# Patient Record
Sex: FEMALE | Race: WHITE | Employment: FULL TIME | URBAN - METROPOLITAN AREA
[De-identification: names, ages, dates, MRNs, and addresses within clinical notes are randomized per-mention and may not be internally consistent; named-entity substitution may affect disease eponyms.]

---

## 2017-01-05 RX ORDER — SODIUM, POTASSIUM,MAG SULFATES 17.5-3.13G
SOLUTION, RECONSTITUTED, ORAL ORAL
Qty: 1 BOTTLE | Refills: 0 | Status: SHIPPED | OUTPATIENT
Start: 2017-01-05 | End: 2017-09-06 | Stop reason: ALTCHOICE

## 2017-01-13 ENCOUNTER — TELEPHONE (OUTPATIENT)
Dept: GASTROENTEROLOGY | Age: 57
End: 2017-01-13

## 2017-02-01 ENCOUNTER — HOSPITAL ENCOUNTER (OUTPATIENT)
Dept: OTHER | Age: 57
Discharge: HOME | End: 2017-02-01
Attending: INTERNAL MEDICINE | Admitting: INTERNAL MEDICINE

## 2017-02-01 LAB
ALBUMIN SERPL-MCNC: 3.7 GM/DL (ref 3.4–5)
ALP BLD-CCNC: 104 IU/L (ref 40–129)
ALT SERPL-CCNC: 22 U/L (ref 10–40)
ANION GAP SERPL CALCULATED.3IONS-SCNC: 13 MMOL/L (ref 4–16)
AST SERPL-CCNC: 35 IU/L (ref 15–37)
BILIRUB SERPL-MCNC: 0.8 MG/DL (ref 0–1)
BUN BLDV-MCNC: 19 MG/DL (ref 6–23)
CALCIUM SERPL-MCNC: 9.3 MG/DL (ref 8.3–10.6)
CHLORIDE BLD-SCNC: 101 MMOL/L (ref 99–110)
CO2: 25 MMOL/L (ref 21–32)
CREAT SERPL-MCNC: 1 MG/DL (ref 0.6–1.1)
GFR AFRICAN AMERICAN: >60 ML/MIN/1.73M2
GFR NON-AFRICAN AMERICAN: 58 ML/MIN/1.73M2
GLUCOSE FASTING: 159 MG/DL (ref 70–99)
POTASSIUM SERPL-SCNC: 4.8 MMOL/L (ref 3.5–5.1)
SODIUM BLD-SCNC: 139 MMOL/L (ref 135–145)
TOTAL PROTEIN: 7.4 GM/DL (ref 6.4–8.2)

## 2017-02-14 ENCOUNTER — HOSPITAL ENCOUNTER (OUTPATIENT)
Dept: WOUND CARE | Age: 57
Discharge: HOME | End: 2017-02-14
Attending: FAMILY MEDICINE | Admitting: FAMILY MEDICINE

## 2017-02-14 VITALS
HEART RATE: 81 BPM | TEMPERATURE: 96.9 F | HEIGHT: 65 IN | RESPIRATION RATE: 20 BRPM | BODY MASS INDEX: 48.82 KG/M2 | SYSTOLIC BLOOD PRESSURE: 173 MMHG | DIASTOLIC BLOOD PRESSURE: 68 MMHG | WEIGHT: 293 LBS

## 2017-02-14 DIAGNOSIS — L97.112 CHRONIC ULCER OF RIGHT THIGH WITH FAT LAYER EXPOSED (HCC): ICD-10-CM

## 2017-02-14 DIAGNOSIS — L98.499 DIABETES MELLITUS WITH SKIN ULCER (HCC): ICD-10-CM

## 2017-02-14 DIAGNOSIS — L98.491 ULCER OF ABDOMEN WALL, LIMITED TO BREAKDOWN OF SKIN (HCC): ICD-10-CM

## 2017-02-14 DIAGNOSIS — E11.622 DIABETES MELLITUS WITH SKIN ULCER (HCC): ICD-10-CM

## 2017-02-14 RX ORDER — CYCLOBENZAPRINE HCL 10 MG
10 TABLET ORAL 2 TIMES DAILY
COMMUNITY
End: 2017-07-06

## 2017-02-14 RX ORDER — BUSPIRONE HYDROCHLORIDE 10 MG/1
10 TABLET ORAL 2 TIMES DAILY
COMMUNITY
End: 2017-09-06 | Stop reason: ALTCHOICE

## 2017-02-14 RX ORDER — LIDOCAINE HYDROCHLORIDE 40 MG/ML
SOLUTION TOPICAL ONCE
Status: DISCONTINUED | OUTPATIENT
Start: 2017-02-14 | End: 2017-02-15 | Stop reason: HOSPADM

## 2017-02-14 RX ORDER — NYSTATIN 10B UNIT
POWDER (EA) MISCELLANEOUS DAILY
COMMUNITY
End: 2017-02-28

## 2017-02-14 RX ORDER — NYSTATIN 100000 [USP'U]/G
POWDER TOPICAL DAILY
COMMUNITY
End: 2017-02-28

## 2017-02-14 ASSESSMENT — PAIN DESCRIPTION - PAIN TYPE: TYPE: ACUTE PAIN

## 2017-02-14 ASSESSMENT — PAIN DESCRIPTION - LOCATION: LOCATION: GROIN

## 2017-02-14 ASSESSMENT — PAIN DESCRIPTION - DESCRIPTORS: DESCRIPTORS: SORE

## 2017-02-14 ASSESSMENT — PAIN SCALES - GENERAL: PAINLEVEL_OUTOF10: 6

## 2017-02-14 ASSESSMENT — PAIN DESCRIPTION - PROGRESSION: CLINICAL_PROGRESSION: NOT CHANGED

## 2017-02-14 ASSESSMENT — PAIN DESCRIPTION - ORIENTATION: ORIENTATION: RIGHT

## 2017-02-14 ASSESSMENT — PAIN DESCRIPTION - FREQUENCY: FREQUENCY: CONTINUOUS

## 2017-02-18 ENCOUNTER — HOSPITAL ENCOUNTER (OUTPATIENT)
Dept: GENERAL RADIOLOGY | Age: 57
Discharge: HOME | End: 2017-02-18
Attending: INTERNAL MEDICINE | Admitting: INTERNAL MEDICINE

## 2017-02-18 DIAGNOSIS — R06.02 SOB (SHORTNESS OF BREATH): ICD-10-CM

## 2017-02-20 PROBLEM — J45.30 MILD PERSISTENT ASTHMA WITHOUT COMPLICATION: Status: ACTIVE | Noted: 2017-02-20

## 2017-02-28 ENCOUNTER — HOSPITAL ENCOUNTER (OUTPATIENT)
Dept: WOUND CARE | Age: 57
Discharge: HOME | End: 2017-02-28
Attending: FAMILY MEDICINE | Admitting: FAMILY MEDICINE

## 2017-02-28 ENCOUNTER — TELEPHONE (OUTPATIENT)
Dept: GASTROENTEROLOGY | Age: 57
End: 2017-02-28

## 2017-02-28 VITALS
HEART RATE: 76 BPM | TEMPERATURE: 96.4 F | DIASTOLIC BLOOD PRESSURE: 67 MMHG | SYSTOLIC BLOOD PRESSURE: 160 MMHG | RESPIRATION RATE: 16 BRPM

## 2017-02-28 DIAGNOSIS — L98.491 ULCER OF ABDOMEN WALL, LIMITED TO BREAKDOWN OF SKIN (HCC): Primary | ICD-10-CM

## 2017-02-28 RX ORDER — FLUCONAZOLE 150 MG/1
150 TABLET ORAL DAILY
Status: ON HOLD | COMMUNITY
Start: 2017-02-28 | End: 2017-03-02 | Stop reason: ALTCHOICE

## 2017-03-07 ENCOUNTER — HOSPITAL ENCOUNTER (OUTPATIENT)
Dept: WOUND CARE | Age: 57
Discharge: HOME | End: 2017-03-07
Attending: FAMILY MEDICINE | Admitting: FAMILY MEDICINE

## 2017-03-07 VITALS
RESPIRATION RATE: 20 BRPM | TEMPERATURE: 97.7 F | HEART RATE: 78 BPM | DIASTOLIC BLOOD PRESSURE: 76 MMHG | SYSTOLIC BLOOD PRESSURE: 139 MMHG

## 2017-03-07 DIAGNOSIS — L98.491 ULCER OF ABDOMEN WALL, LIMITED TO BREAKDOWN OF SKIN (HCC): Primary | ICD-10-CM

## 2017-03-07 DIAGNOSIS — L97.112 CHRONIC ULCER OF RIGHT THIGH WITH FAT LAYER EXPOSED (HCC): ICD-10-CM

## 2017-03-11 LAB
CULTURE: NORMAL
REPORT STATUS: NORMAL
REQUEST PROBLEM: NORMAL
SPECIMEN: NORMAL

## 2017-03-14 ENCOUNTER — HOSPITAL ENCOUNTER (OUTPATIENT)
Dept: WOUND CARE | Age: 57
Discharge: HOME | End: 2017-03-14
Attending: FAMILY MEDICINE | Admitting: FAMILY MEDICINE

## 2017-03-14 VITALS
TEMPERATURE: 98.5 F | RESPIRATION RATE: 20 BRPM | DIASTOLIC BLOOD PRESSURE: 82 MMHG | HEART RATE: 80 BPM | SYSTOLIC BLOOD PRESSURE: 148 MMHG

## 2017-03-14 DIAGNOSIS — S31.103A UNSPECIFIED OPEN WOUND OF ABDOMINAL WALL, RIGHT LOWER QUADRANT WITHOUT PENETRATION INTO PERITONEAL CAVITY, INITIAL ENCOUNTER: ICD-10-CM

## 2017-03-14 DIAGNOSIS — L98.491 ULCER OF ABDOMEN WALL, LIMITED TO BREAKDOWN OF SKIN (HCC): Primary | ICD-10-CM

## 2017-03-14 PROCEDURE — 99202 OFFICE O/P NEW SF 15 MIN: CPT | Performed by: INTERNAL MEDICINE

## 2017-03-14 ASSESSMENT — PAIN DESCRIPTION - FREQUENCY: FREQUENCY: INTERMITTENT

## 2017-03-14 ASSESSMENT — PAIN DESCRIPTION - LOCATION: LOCATION: GROIN

## 2017-03-14 ASSESSMENT — PAIN DESCRIPTION - PROGRESSION: CLINICAL_PROGRESSION: NOT CHANGED

## 2017-03-14 ASSESSMENT — PAIN DESCRIPTION - PAIN TYPE: TYPE: ACUTE PAIN

## 2017-03-14 ASSESSMENT — PAIN DESCRIPTION - DESCRIPTORS: DESCRIPTORS: SORE

## 2017-03-14 ASSESSMENT — PAIN SCALES - GENERAL: PAINLEVEL_OUTOF10: 6

## 2017-03-14 ASSESSMENT — PAIN DESCRIPTION - ORIENTATION: ORIENTATION: RIGHT

## 2017-03-20 ENCOUNTER — TELEPHONE (OUTPATIENT)
Dept: GASTROENTEROLOGY | Age: 57
End: 2017-03-20

## 2017-03-21 ENCOUNTER — HOSPITAL ENCOUNTER (OUTPATIENT)
Dept: WOUND CARE | Age: 57
Discharge: HOME | End: 2017-03-21
Attending: FAMILY MEDICINE | Admitting: FAMILY MEDICINE

## 2017-03-21 VITALS
SYSTOLIC BLOOD PRESSURE: 142 MMHG | RESPIRATION RATE: 16 BRPM | DIASTOLIC BLOOD PRESSURE: 65 MMHG | HEART RATE: 79 BPM | TEMPERATURE: 97.6 F

## 2017-03-21 DIAGNOSIS — L98.491 ULCER OF ABDOMEN WALL, LIMITED TO BREAKDOWN OF SKIN (HCC): ICD-10-CM

## 2017-03-21 DIAGNOSIS — L97.112 CHRONIC ULCER OF RIGHT THIGH WITH FAT LAYER EXPOSED (HCC): ICD-10-CM

## 2017-03-21 DIAGNOSIS — S31.103A UNSPECIFIED OPEN WOUND OF ABDOMINAL WALL, RIGHT LOWER QUADRANT WITHOUT PENETRATION INTO PERITONEAL CAVITY, INITIAL ENCOUNTER: Primary | ICD-10-CM

## 2017-03-29 ENCOUNTER — HOSPITAL ENCOUNTER (OUTPATIENT)
Dept: WOUND CARE | Age: 57
Discharge: HOME | End: 2017-03-29
Attending: ORTHOPAEDIC SURGERY | Admitting: ORTHOPAEDIC SURGERY

## 2017-03-29 VITALS
HEART RATE: 74 BPM | SYSTOLIC BLOOD PRESSURE: 149 MMHG | DIASTOLIC BLOOD PRESSURE: 62 MMHG | RESPIRATION RATE: 16 BRPM | TEMPERATURE: 97.5 F

## 2017-03-29 DIAGNOSIS — L97.112 CHRONIC ULCER OF RIGHT THIGH WITH FAT LAYER EXPOSED (HCC): ICD-10-CM

## 2017-03-29 DIAGNOSIS — S31.103A UNSPECIFIED OPEN WOUND OF ABDOMINAL WALL, RIGHT LOWER QUADRANT WITHOUT PENETRATION INTO PERITONEAL CAVITY, INITIAL ENCOUNTER: ICD-10-CM

## 2017-03-29 DIAGNOSIS — L98.491 ULCER OF ABDOMEN WALL, LIMITED TO BREAKDOWN OF SKIN (HCC): Primary | ICD-10-CM

## 2017-04-04 ENCOUNTER — HOSPITAL ENCOUNTER (OUTPATIENT)
Dept: WOUND CARE | Age: 57
Discharge: HOME | End: 2017-04-04
Attending: FAMILY MEDICINE | Admitting: FAMILY MEDICINE

## 2017-04-04 VITALS
SYSTOLIC BLOOD PRESSURE: 164 MMHG | HEART RATE: 88 BPM | RESPIRATION RATE: 16 BRPM | DIASTOLIC BLOOD PRESSURE: 64 MMHG | TEMPERATURE: 97.2 F

## 2017-04-04 ASSESSMENT — PAIN DESCRIPTION - PROGRESSION: CLINICAL_PROGRESSION: NOT CHANGED

## 2017-04-04 ASSESSMENT — PAIN DESCRIPTION - ONSET: ONSET: ON-GOING

## 2017-04-04 ASSESSMENT — PAIN DESCRIPTION - LOCATION: LOCATION: BACK

## 2017-04-04 ASSESSMENT — PAIN DESCRIPTION - DESCRIPTORS: DESCRIPTORS: ACHING;THROBBING;SORE

## 2017-04-04 ASSESSMENT — PAIN DESCRIPTION - FREQUENCY: FREQUENCY: CONTINUOUS

## 2017-04-04 ASSESSMENT — PAIN DESCRIPTION - PAIN TYPE: TYPE: CHRONIC PAIN

## 2017-04-04 ASSESSMENT — PAIN SCALES - GENERAL: PAINLEVEL_OUTOF10: 10

## 2017-04-04 ASSESSMENT — PAIN DESCRIPTION - ORIENTATION: ORIENTATION: LOWER;MID

## 2017-04-11 ENCOUNTER — HOSPITAL ENCOUNTER (OUTPATIENT)
Dept: WOUND CARE | Age: 57
Discharge: HOME | End: 2017-04-11
Attending: FAMILY MEDICINE | Admitting: FAMILY MEDICINE

## 2017-04-11 VITALS
RESPIRATION RATE: 20 BRPM | SYSTOLIC BLOOD PRESSURE: 165 MMHG | TEMPERATURE: 98 F | HEART RATE: 74 BPM | DIASTOLIC BLOOD PRESSURE: 64 MMHG

## 2017-04-11 DIAGNOSIS — S31.103A UNSPECIFIED OPEN WOUND OF ABDOMINAL WALL, RIGHT LOWER QUADRANT WITHOUT PENETRATION INTO PERITONEAL CAVITY, INITIAL ENCOUNTER: ICD-10-CM

## 2017-04-11 DIAGNOSIS — L98.491 ULCER OF ABDOMEN WALL, LIMITED TO BREAKDOWN OF SKIN (HCC): Primary | ICD-10-CM

## 2017-04-18 ENCOUNTER — HOSPITAL ENCOUNTER (OUTPATIENT)
Dept: WOUND CARE | Age: 57
Discharge: HOME | End: 2017-04-18
Attending: FAMILY MEDICINE | Admitting: FAMILY MEDICINE

## 2017-04-18 VITALS — HEART RATE: 82 BPM | TEMPERATURE: 97.7 F | SYSTOLIC BLOOD PRESSURE: 158 MMHG | DIASTOLIC BLOOD PRESSURE: 68 MMHG

## 2017-04-18 DIAGNOSIS — S31.103A UNSPECIFIED OPEN WOUND OF ABDOMINAL WALL, RIGHT LOWER QUADRANT WITHOUT PENETRATION INTO PERITONEAL CAVITY, INITIAL ENCOUNTER: Primary | ICD-10-CM

## 2017-04-25 ENCOUNTER — HOSPITAL ENCOUNTER (OUTPATIENT)
Dept: LAB | Age: 57
Discharge: HOME | End: 2017-04-25
Attending: INTERNAL MEDICINE | Admitting: INTERNAL MEDICINE

## 2017-04-25 LAB
ALBUMIN SERPL-MCNC: 3.6 GM/DL (ref 3.4–5)
ALP BLD-CCNC: 99 IU/L (ref 40–128)
ALT SERPL-CCNC: 20 U/L (ref 10–40)
ANION GAP SERPL CALCULATED.3IONS-SCNC: 12 MMOL/L (ref 4–16)
AST SERPL-CCNC: 37 IU/L (ref 15–37)
BILIRUB SERPL-MCNC: 0.5 MG/DL (ref 0–1)
BUN BLDV-MCNC: 22 MG/DL (ref 6–23)
CALCIUM SERPL-MCNC: 9 MG/DL (ref 8.3–10.6)
CHLORIDE BLD-SCNC: 104 MMOL/L (ref 99–110)
CO2: 24 MMOL/L (ref 21–32)
CREAT SERPL-MCNC: 1.2 MG/DL (ref 0.6–1.1)
ESTIMATED AVERAGE GLUCOSE: 171 MG/DL
GFR AFRICAN AMERICAN: 56 ML/MIN/1.73M2
GFR NON-AFRICAN AMERICAN: 47 ML/MIN/1.73M2
GLUCOSE FASTING: 107 MG/DL (ref 70–99)
HBA1C MFR BLD: 7.6 % (ref 4.2–6.3)
POTASSIUM SERPL-SCNC: 4.9 MMOL/L (ref 3.5–5.1)
SODIUM BLD-SCNC: 140 MMOL/L (ref 135–145)
T4 FREE: 1.51 NG/DL (ref 0.9–1.8)
TOTAL PROTEIN: 6.6 GM/DL (ref 6.4–8.2)
TSH HIGH SENSITIVITY: 0.45 UIU/ML (ref 0.27–4.2)

## 2017-05-02 ENCOUNTER — HOSPITAL ENCOUNTER (OUTPATIENT)
Dept: WOUND CARE | Age: 57
Discharge: HOME | End: 2017-05-02
Attending: FAMILY MEDICINE | Admitting: FAMILY MEDICINE

## 2017-05-02 VITALS
SYSTOLIC BLOOD PRESSURE: 138 MMHG | RESPIRATION RATE: 18 BRPM | DIASTOLIC BLOOD PRESSURE: 60 MMHG | HEART RATE: 72 BPM | TEMPERATURE: 98.2 F

## 2017-05-02 DIAGNOSIS — L98.491 ULCER OF ABDOMEN WALL, LIMITED TO BREAKDOWN OF SKIN (HCC): ICD-10-CM

## 2017-05-02 DIAGNOSIS — L97.112 CHRONIC ULCER OF RIGHT THIGH WITH FAT LAYER EXPOSED (HCC): ICD-10-CM

## 2017-05-02 DIAGNOSIS — S31.103A UNSPECIFIED OPEN WOUND OF ABDOMINAL WALL, RIGHT LOWER QUADRANT WITHOUT PENETRATION INTO PERITONEAL CAVITY, INITIAL ENCOUNTER: Primary | ICD-10-CM

## 2017-05-16 ENCOUNTER — HOSPITAL ENCOUNTER (OUTPATIENT)
Dept: WOUND CARE | Age: 57
Discharge: HOME | End: 2017-05-16
Attending: FAMILY MEDICINE | Admitting: FAMILY MEDICINE

## 2017-05-16 VITALS
HEART RATE: 76 BPM | TEMPERATURE: 98 F | SYSTOLIC BLOOD PRESSURE: 148 MMHG | RESPIRATION RATE: 20 BRPM | DIASTOLIC BLOOD PRESSURE: 59 MMHG

## 2017-05-16 DIAGNOSIS — L98.491 ULCER OF ABDOMEN WALL, LIMITED TO BREAKDOWN OF SKIN (HCC): ICD-10-CM

## 2017-05-16 DIAGNOSIS — S31.103A UNSPECIFIED OPEN WOUND OF ABDOMINAL WALL, RIGHT LOWER QUADRANT WITHOUT PENETRATION INTO PERITONEAL CAVITY, INITIAL ENCOUNTER: Primary | ICD-10-CM

## 2017-06-24 ENCOUNTER — HOSPITAL ENCOUNTER (OUTPATIENT)
Dept: LAB | Age: 57
Discharge: HOME | End: 2017-06-24
Attending: SPECIALIST | Admitting: SPECIALIST

## 2017-06-24 LAB
FERRITIN: 12 NG/ML (ref 15–150)
HBV SURFACE AB TITR SER: <3.5 {TITER}
HEPATITIS B SURFACE ANTIGEN: NON REACTIVE
IRON: 30 UG/DL (ref 37–145)
PCT TRANSFERRIN: 6 % (ref 10–44)
TOTAL IRON BINDING CAPACITY: 473 UG/DL (ref 250–450)
TRANSFERRIN: 404.7 MG/DL (ref 200–360)
UNSATURATED IRON BINDING CAPACITY: 443 UG/DL (ref 110–370)

## 2017-06-27 LAB
ANTI-MITOCHON TITER: 12.6
ANTI-NUCLEAR ANTIBODY (ANA): NORMAL
F-ACTIN AB, IGG: 36
HAV AB SERPL IA-ACNC: POSITIVE

## 2017-06-28 LAB
HCV QUANTITATIVE: <15 IU/ML
HEP CRNA PCR QNT INTERP: NOT DETECTED
HEPATITIS C RNA PCR QUANT: <1.2
Lab: NORMAL

## 2017-06-29 LAB — SMOOTH MUSCLE AB IGG TITER: NORMAL

## 2017-07-14 ENCOUNTER — HOSPITAL ENCOUNTER (OUTPATIENT)
Dept: LAB | Age: 57
Discharge: HOME | End: 2017-07-14
Attending: INTERNAL MEDICINE | Admitting: INTERNAL MEDICINE

## 2017-07-14 LAB
ALBUMIN SERPL-MCNC: 3.5 GM/DL (ref 3.4–5)
ALP BLD-CCNC: 96 IU/L (ref 40–128)
ALT SERPL-CCNC: 20 U/L (ref 10–40)
ANION GAP SERPL CALCULATED.3IONS-SCNC: 12 MMOL/L (ref 4–16)
AST SERPL-CCNC: 30 IU/L (ref 15–37)
BILIRUB SERPL-MCNC: 0.7 MG/DL (ref 0–1)
BUN BLDV-MCNC: 23 MG/DL (ref 6–23)
CALCIUM SERPL-MCNC: 8.8 MG/DL (ref 8.3–10.6)
CHLORIDE BLD-SCNC: 104 MMOL/L (ref 99–110)
CO2: 22 MMOL/L (ref 21–32)
CREAT SERPL-MCNC: 1.1 MG/DL (ref 0.6–1.1)
ESTIMATED AVERAGE GLUCOSE: 140 MG/DL
GFR AFRICAN AMERICAN: >60 ML/MIN/1.73M2
GFR NON-AFRICAN AMERICAN: 52 ML/MIN/1.73M2
GLUCOSE FASTING: 158 MG/DL (ref 70–99)
HBA1C MFR BLD: 6.5 % (ref 4.2–6.3)
POTASSIUM SERPL-SCNC: 4.9 MMOL/L (ref 3.5–5.1)
SODIUM BLD-SCNC: 138 MMOL/L (ref 135–145)
T4 FREE: 1.58 NG/DL (ref 0.9–1.8)
TOTAL PROTEIN: 7.5 GM/DL (ref 6.4–8.2)
TSH HIGH SENSITIVITY: 0.26 UIU/ML (ref 0.27–4.2)

## 2017-07-28 ENCOUNTER — HOSPITAL ENCOUNTER (OUTPATIENT)
Dept: CT IMAGING | Age: 57
Discharge: HOME | End: 2017-07-28
Attending: SPECIALIST | Admitting: SPECIALIST

## 2017-07-28 DIAGNOSIS — R76.0 ELEVATED ANTIBODY LEVELS: ICD-10-CM

## 2017-07-28 DIAGNOSIS — R76.0 RAISED ANTIBODY TITER: ICD-10-CM

## 2017-07-28 LAB
GFR AFRICAN AMERICAN: >60 ML/MIN/1.73M2
GFR NON-AFRICAN AMERICAN: 58 ML/MIN/1.73M2
POC CREATININE: 1 MG/DL (ref 0.6–1.1)

## 2017-07-28 RX ORDER — 0.9 % SODIUM CHLORIDE 0.9 %
10 VIAL (ML) INJECTION 2 TIMES DAILY
Status: DISCONTINUED | OUTPATIENT
Start: 2017-07-28 | End: 2017-07-29 | Stop reason: HOSPADM

## 2017-07-28 RX ADMIN — Medication 10 ML: at 09:53

## 2017-08-04 ENCOUNTER — HOSPITAL ENCOUNTER (OUTPATIENT)
Dept: OTHER | Age: 57
Discharge: HOME | End: 2017-08-04
Attending: INTERNAL MEDICINE | Admitting: INTERNAL MEDICINE

## 2017-08-04 LAB
ALBUMIN SERPL-MCNC: 3.6 GM/DL (ref 3.4–5)
ALP BLD-CCNC: 97 IU/L (ref 40–129)
ALT SERPL-CCNC: 18 U/L (ref 10–40)
ANION GAP SERPL CALCULATED.3IONS-SCNC: 11 MMOL/L (ref 4–16)
AST SERPL-CCNC: 34 IU/L (ref 15–37)
BILIRUB SERPL-MCNC: 0.7 MG/DL (ref 0–1)
BUN BLDV-MCNC: 20 MG/DL (ref 6–23)
CALCIUM SERPL-MCNC: 8.9 MG/DL (ref 8.3–10.6)
CHLORIDE BLD-SCNC: 103 MMOL/L (ref 99–110)
CO2: 23 MMOL/L (ref 21–32)
CREAT SERPL-MCNC: 1 MG/DL (ref 0.6–1.1)
FERRITIN: 12 NG/ML (ref 15–150)
GFR AFRICAN AMERICAN: >60 ML/MIN/1.73M2
GFR NON-AFRICAN AMERICAN: 58 ML/MIN/1.73M2
GLUCOSE BLD-MCNC: 145 MG/DL (ref 70–140)
IRON: 31 UG/DL (ref 37–145)
PCT TRANSFERRIN: 6 % (ref 10–44)
POTASSIUM SERPL-SCNC: 4.7 MMOL/L (ref 3.5–5.1)
SODIUM BLD-SCNC: 137 MMOL/L (ref 135–145)
TOTAL IRON BINDING CAPACITY: 481 UG/DL (ref 250–450)
TOTAL PROTEIN: 7.3 GM/DL (ref 6.4–8.2)
UNSATURATED IRON BINDING CAPACITY: 450 UG/DL (ref 110–370)

## 2017-08-30 ENCOUNTER — HOSPITAL ENCOUNTER (OUTPATIENT)
Dept: OTHER | Age: 57
Discharge: HOME | End: 2017-08-30
Attending: INTERNAL MEDICINE | Admitting: INTERNAL MEDICINE

## 2017-08-30 LAB
ALBUMIN SERPL-MCNC: 3.9 GM/DL (ref 3.4–5)
ALP BLD-CCNC: 104 IU/L (ref 40–129)
ALT SERPL-CCNC: 33 U/L (ref 10–40)
ANION GAP SERPL CALCULATED.3IONS-SCNC: 12 MMOL/L (ref 4–16)
AST SERPL-CCNC: 47 IU/L (ref 15–37)
BILIRUB SERPL-MCNC: 1 MG/DL (ref 0–1)
BUN BLDV-MCNC: 23 MG/DL (ref 6–23)
CALCIUM SERPL-MCNC: 9.4 MG/DL (ref 8.3–10.6)
CHLORIDE BLD-SCNC: 100 MMOL/L (ref 99–110)
CO2: 26 MMOL/L (ref 21–32)
CREAT SERPL-MCNC: 1.1 MG/DL (ref 0.6–1.1)
FERRITIN: 204 NG/ML (ref 15–150)
GFR AFRICAN AMERICAN: >60 ML/MIN/1.73M2
GFR NON-AFRICAN AMERICAN: 52 ML/MIN/1.73M2
GLUCOSE BLD-MCNC: 189 MG/DL (ref 70–140)
IRON: 81 UG/DL (ref 37–145)
PCT TRANSFERRIN: 22 % (ref 10–44)
POTASSIUM SERPL-SCNC: 5 MMOL/L (ref 3.5–5.1)
SODIUM BLD-SCNC: 138 MMOL/L (ref 135–145)
TOTAL IRON BINDING CAPACITY: 376 UG/DL (ref 250–450)
TOTAL PROTEIN: 7.4 GM/DL (ref 6.4–8.2)
UNSATURATED IRON BINDING CAPACITY: 295 UG/DL (ref 110–370)

## 2017-09-10 PROBLEM — R59.0 RETROPERITONEAL LYMPHADENOPATHY: Status: ACTIVE | Noted: 2017-09-10

## 2017-11-03 ENCOUNTER — HOSPITAL ENCOUNTER (OUTPATIENT)
Dept: OTHER | Age: 57
Discharge: HOME | End: 2017-11-03
Attending: INTERNAL MEDICINE | Admitting: INTERNAL MEDICINE

## 2017-11-03 LAB
FERRITIN: 26 NG/ML (ref 15–150)
IRON: 57 UG/DL (ref 37–145)
PCT TRANSFERRIN: 15 % (ref 10–44)
TOTAL IRON BINDING CAPACITY: 387 UG/DL (ref 250–450)
UNSATURATED IRON BINDING CAPACITY: 330 UG/DL (ref 110–370)

## 2017-12-08 ENCOUNTER — HOSPITAL ENCOUNTER (OUTPATIENT)
Dept: WOUND CARE | Age: 57
Discharge: HOME | End: 2017-12-08
Attending: INTERNAL MEDICINE | Admitting: INTERNAL MEDICINE

## 2017-12-08 VITALS
WEIGHT: 293 LBS | DIASTOLIC BLOOD PRESSURE: 97 MMHG | HEIGHT: 65 IN | BODY MASS INDEX: 48.82 KG/M2 | HEART RATE: 80 BPM | SYSTOLIC BLOOD PRESSURE: 142 MMHG | TEMPERATURE: 98 F | RESPIRATION RATE: 16 BRPM

## 2017-12-08 DIAGNOSIS — L08.9 LOCAL INFECTION OF SKIN AND SUBCUTANEOUS TISSUE: ICD-10-CM

## 2017-12-08 DIAGNOSIS — L98.492 ULCER OF ABDOMEN WALL WITH FAT LAYER EXPOSED (HCC): ICD-10-CM

## 2017-12-08 DIAGNOSIS — S31.103A UNSPECIFIED OPEN WOUND OF ABDOMINAL WALL, RIGHT LOWER QUADRANT WITHOUT PENETRATION INTO PERITONEAL CAVITY, INITIAL ENCOUNTER: Primary | ICD-10-CM

## 2017-12-08 RX ORDER — DOXYCYCLINE HYCLATE 100 MG
100 TABLET ORAL 2 TIMES DAILY
Qty: 20 TABLET | Refills: 0 | Status: SHIPPED | OUTPATIENT
Start: 2017-12-08 | End: 2017-12-18

## 2017-12-08 ASSESSMENT — PAIN DESCRIPTION - LOCATION: LOCATION: GROIN

## 2017-12-08 ASSESSMENT — PAIN DESCRIPTION - PAIN TYPE: TYPE: ACUTE PAIN

## 2017-12-08 ASSESSMENT — PAIN SCALES - GENERAL: PAINLEVEL_OUTOF10: 10

## 2017-12-08 ASSESSMENT — PAIN DESCRIPTION - PROGRESSION: CLINICAL_PROGRESSION: NOT CHANGED

## 2017-12-08 ASSESSMENT — PAIN DESCRIPTION - ONSET: ONSET: ON-GOING

## 2017-12-08 ASSESSMENT — PAIN DESCRIPTION - ORIENTATION: ORIENTATION: LEFT

## 2017-12-08 ASSESSMENT — PAIN DESCRIPTION - DESCRIPTORS: DESCRIPTORS: BURNING

## 2017-12-08 NOTE — PLAN OF CARE
Problem: Pain:  Intervention: Opioid analgesia side-effects  SEE FLOWSHEET  Intervention: Assess barriers to pain control  SEE FLOWSHEET  Intervention: Promote participation in pain management plan  SEE FLOWSHEET    Goal: Pain level will decrease  Pain level will decrease   Outcome: Ongoing  SEE FLOWSHEET  Goal: Control of acute pain  Control of acute pain   Outcome: Ongoing  SEE FLOWSHEET  Goal: Control of chronic pain  Control of chronic pain   Outcome: Ongoing  SEE FLOWSHEET

## 2017-12-13 LAB
CULTURE: NORMAL
REPORT STATUS: NORMAL
REQUEST PROBLEM: NORMAL
SPECIMEN: NORMAL

## 2017-12-15 ENCOUNTER — HOSPITAL ENCOUNTER (OUTPATIENT)
Dept: WOUND CARE | Age: 57
Discharge: HOME | End: 2017-12-15
Attending: INTERNAL MEDICINE | Admitting: INTERNAL MEDICINE

## 2017-12-15 VITALS — HEART RATE: 87 BPM | SYSTOLIC BLOOD PRESSURE: 189 MMHG | DIASTOLIC BLOOD PRESSURE: 69 MMHG | TEMPERATURE: 97 F

## 2017-12-15 DIAGNOSIS — L98.492 ULCER OF ABDOMEN WALL WITH FAT LAYER EXPOSED (HCC): Primary | ICD-10-CM

## 2017-12-15 DIAGNOSIS — L08.9 LOCAL INFECTION OF SKIN AND SUBCUTANEOUS TISSUE: ICD-10-CM

## 2017-12-15 DIAGNOSIS — S31.103A UNSPECIFIED OPEN WOUND OF ABDOMINAL WALL, RIGHT LOWER QUADRANT WITHOUT PENETRATION INTO PERITONEAL CAVITY, INITIAL ENCOUNTER: ICD-10-CM

## 2017-12-15 RX ORDER — CIPROFLOXACIN 500 MG/1
500 TABLET, FILM COATED ORAL 2 TIMES DAILY
Qty: 20 TABLET | Refills: 0 | Status: SHIPPED | OUTPATIENT
Start: 2017-12-15 | End: 2017-12-25

## 2017-12-15 ASSESSMENT — PAIN DESCRIPTION - DESCRIPTORS: DESCRIPTORS: BURNING

## 2017-12-15 ASSESSMENT — PAIN DESCRIPTION - ORIENTATION: ORIENTATION: LEFT

## 2017-12-15 ASSESSMENT — PAIN DESCRIPTION - PAIN TYPE: TYPE: ACUTE PAIN

## 2017-12-15 ASSESSMENT — PAIN SCALES - GENERAL: PAINLEVEL_OUTOF10: 7

## 2017-12-15 ASSESSMENT — PAIN DESCRIPTION - PROGRESSION: CLINICAL_PROGRESSION: NOT CHANGED

## 2017-12-15 ASSESSMENT — PAIN DESCRIPTION - FREQUENCY: FREQUENCY: CONTINUOUS

## 2017-12-15 ASSESSMENT — PAIN DESCRIPTION - ONSET: ONSET: ON-GOING

## 2017-12-16 NOTE — PROGRESS NOTES
disease     WD-Local infection of skin and subcutaneous tissue 12/8/2017    WD-Ulcer of abdomen wall, limited to breakdown of skin ([facility] [address]     WD-Unspecified open wound of abdominal wall, right lower quadrant without penetration into peritoneal cavity, initial encounter        PAST SURGICAL HISTORY    Past Surgical History:   Procedure Laterality Date    BREAST SURGERY Left 2015    EXCISION MASS\"lumpectomy- took out 3 lymph nodes all ok\"    CYSTOSCOPY Left 12/23/13    stent placement    LIVER BIOPSY  07/10/2017    THYROIDECTOMY  1990    \"took most of the thyroid out\"    UPPER GASTROINTESTINAL ENDOSCOPY         FAMILY HISTORY    Family History   Problem Relation Age of Onset    Cancer Mother      breast    Diabetes Mother     Heart Disease Father      MI    Diabetes Father     Other Sister      cirhosis    Heart Disease Sister     Other Brother      cirhosis    Kidney Disease Brother     Asthma Sister      COPD    Cancer Brother      liver cancer       SOCIAL HISTORY    Social History   Substance Use Topics    Smoking status: Former Smoker     Packs/day: 1.50     Years: 20.00     Quit date: 3/23/2005    Smokeless tobacco: Never Used    Alcohol use No       ALLERGIES    Allergies   Allergen Reactions    Sulfa Antibiotics Rash    Nystatin Other (See Comments)     Pt reports \"burning\" sensation to skin    Tape [Adhesive Tape]        MEDICATIONS    Current Outpatient Prescriptions on File Prior to Encounter   Medication Sig Dispense Refill    doxycycline hyclate (VIBRA-TABS) 100 MG tablet Take 1 tablet by mouth 2 times daily for 10 days 20 tablet 0    fluconazole (DIFLUCAN) 100 MG tablet Take 100 mg by mouth  2    spironolactone (ALDACTONE) 50 MG tablet Take 100 mg by mouth 2 times daily Pt stated they take two 50 mg tabs BID      ipratropium-albuterol (DUONEB) 0.5-2.5 (3) MG/3ML SOLN nebulizer solution Inhale 3 mLs into the lungs every 6 hours 120 vial 5    budesonide-formoterol (SYMBICORT) description below     All active wounds listed below with today's date are evaluated      Wound 12/08/17 WOUND #7 LEFT ABD(ONSET 1 WK) (Active)   Dressing Status Clean;Dry; Intact 12/15/2017  4:18 PM   Dressing Changed Changed/New 12/15/2017  4:18 PM   Wound Cleansed Wound cleanser 12/15/2017  2:46 PM   Wound Length (cm) 6.7 cm 12/15/2017  2:46 PM   Wound Width (cm) 10.5 cm 12/15/2017  2:46 PM   Wound Depth (cm)  0.1 12/15/2017  2:46 PM   Calculated Wound Size (cm^2) (l*w) 70.35 cm^2 12/15/2017  2:46 PM   Change in Wound Size % (l*w) -184.82 12/15/2017  2:46 PM   Distance Tunneling (cm) 0 cm 12/15/2017  2:46 PM   Tunneling Position ___ O'Clock 0 12/15/2017  2:46 PM   Undermining Starts ___ O'Clock 0 12/15/2017  2:46 PM   Undermining Ends___ O'Clock 0 12/15/2017  2:46 PM   Wound Assessment Red;Yellow 12/15/2017  2:46 PM   Drainage Amount Moderate 12/15/2017  2:46 PM   Drainage Description Yellow 12/15/2017  2:46 PM   Odor None 12/15/2017  2:46 PM   Viola-wound Assessment Excoriated 12/15/2017  2:46 PM   Non-staged Wound Description Full thickness 12/15/2017  2:46 PM   Red%Wound Bed 50 12/15/2017  2:46 PM   Yellow%Wound Bed 50 12/15/2017  2:46 PM   Black%Wound Bed 0 12/15/2017  2:46 PM   Purple%Wound Bed 0 12/15/2017  2:46 PM   Other%Wound Bed 0 12/15/2017  2:46 PM   Number of days: 7       Assessment:       Problem List Items Addressed This Visit     WD-Ulcer of abdomen wall with fat layer exposed ([address] - Primary    Relevant Orders    Culture, Generic    WD-Unspecified open wound of abdominal wall, right lower quadrant without penetration into peritoneal cavity, initial encounter    WD-Local infection of skin and subcutaneous tissue    Relevant Orders    Culture, Generic      Other Visit Diagnoses    None. Status of wound progress and description from last visit:   Her wounds look worse- too wet and they look infected. I will start cipro as this likely is either pseudomas or proteus.   I have taken another times a day and/or when sitting. When taking antibiotics take entire prescription as ordered by physician do not stop taking until medicine is all gone.                                                                 Orders for this week:12/15/17                  LEFT ABDOMEN CLUSTER --  DANKINS DAMPENED FLUFFED 4X4,ABD. HOLD IN PLACE WITH CLOTHES. MAY USE XINC OXIDE TO REDDENED AREAS AROUND WOUNDS     Follow up with Dr Tosha Powers  In 1 weeks in the wound care center  Call [phone] for any questions or concerns.   Date__________   Time____________        Treatment Note Wound 12/08/17 WOUND #7 LEFT ABD(ONSET 1 WK)-Dressing/Treatment:  (dankins soaked fluffed 4x4 & abd)    Written Patient Dismissal Instructions Given            Electronically signed by Aries Bunch MD on 12/15/2017 at 7:51 PM

## 2017-12-19 LAB
CULTURE: NORMAL
CULTURE: NORMAL
ORGANISM: NORMAL
REPORT STATUS: NORMAL
REQUEST PROBLEM: NORMAL
SPECIMEN: NORMAL

## 2017-12-22 ENCOUNTER — HOSPITAL ENCOUNTER (OUTPATIENT)
Dept: WOUND CARE | Age: 57
Discharge: HOME | End: 2017-12-22
Attending: INTERNAL MEDICINE | Admitting: INTERNAL MEDICINE

## 2017-12-22 VITALS
HEART RATE: 80 BPM | RESPIRATION RATE: 16 BRPM | SYSTOLIC BLOOD PRESSURE: 205 MMHG | DIASTOLIC BLOOD PRESSURE: 82 MMHG | TEMPERATURE: 96.9 F

## 2017-12-22 DIAGNOSIS — L97.112 CHRONIC ULCER OF RIGHT THIGH WITH FAT LAYER EXPOSED (HCC): Primary | ICD-10-CM

## 2017-12-22 DIAGNOSIS — L08.9 LOCAL INFECTION OF SKIN AND SUBCUTANEOUS TISSUE: ICD-10-CM

## 2017-12-22 DIAGNOSIS — L98.492 ULCER OF ABDOMEN WALL WITH FAT LAYER EXPOSED (HCC): ICD-10-CM

## 2017-12-22 DIAGNOSIS — S31.103A UNSPECIFIED OPEN WOUND OF ABDOMINAL WALL, RIGHT LOWER QUADRANT WITHOUT PENETRATION INTO PERITONEAL CAVITY, INITIAL ENCOUNTER: ICD-10-CM

## 2017-12-22 ASSESSMENT — PAIN DESCRIPTION - FREQUENCY: FREQUENCY: CONTINUOUS

## 2017-12-22 ASSESSMENT — PAIN DESCRIPTION - LOCATION: LOCATION: GROIN

## 2017-12-22 ASSESSMENT — PAIN DESCRIPTION - ORIENTATION: ORIENTATION: LEFT

## 2017-12-22 ASSESSMENT — PAIN DESCRIPTION - PROGRESSION: CLINICAL_PROGRESSION: NOT CHANGED

## 2017-12-22 ASSESSMENT — PAIN SCALES - GENERAL: PAINLEVEL_OUTOF10: 4

## 2017-12-22 ASSESSMENT — PAIN DESCRIPTION - PAIN TYPE: TYPE: ACUTE PAIN

## 2017-12-22 NOTE — PROGRESS NOTES
initial encounter        PAST SURGICAL HISTORY    Past Surgical History:   Procedure Laterality Date    BREAST SURGERY Left 2015    EXCISION MASS\"lumpectomy- took out 3 lymph nodes all ok\"    CYSTOSCOPY Left 12/23/13    stent placement    LIVER BIOPSY  07/10/2017    THYROIDECTOMY  1990    \"took most of the thyroid out\"    UPPER GASTROINTESTINAL ENDOSCOPY         FAMILY HISTORY    Family History   Problem Relation Age of Onset    Cancer Mother      breast    Diabetes Mother     Heart Disease Father      MI    Diabetes Father     Other Sister      cirhosis    Heart Disease Sister     Other Brother      cirhosis    Kidney Disease Brother     Asthma Sister      COPD    Cancer Brother      liver cancer       SOCIAL HISTORY    Social History   Substance Use Topics    Smoking status: Former Smoker     Packs/day: 1.50     Years: 20.00     Quit date: 3/23/2005    Smokeless tobacco: Never Used    Alcohol use No       ALLERGIES    Allergies   Allergen Reactions    Sulfa Antibiotics Rash    Nystatin Other (See Comments)     Pt reports \"burning\" sensation to skin    Tape [Adhesive Tape]        MEDICATIONS    Current Outpatient Prescriptions on File Prior to Encounter   Medication Sig Dispense Refill    ciprofloxacin (CIPRO) 500 MG tablet Take 1 tablet by mouth 2 times daily for 10 days 20 tablet 0    fluconazole (DIFLUCAN) 100 MG tablet Take 100 mg by mouth  2    spironolactone (ALDACTONE) 50 MG tablet Take 100 mg by mouth 2 times daily Pt stated they take two 50 mg tabs BID      ipratropium-albuterol (DUONEB) 0.5-2.5 (3) MG/3ML SOLN nebulizer solution Inhale 3 mLs into the lungs every 6 hours 120 vial 5    budesonide-formoterol (SYMBICORT) 160-4.5 MCG/ACT AERO Inhale 2 puffs into the lungs 2 times daily 1 Inhaler 5    torsemide (DEMADEX) 20 MG tablet Take 20 mg by mouth daily   2    omeprazole (PRILOSEC) 40 MG capsule Take 1 capsule by mouth daily 30 capsule 3    betamethasone valerate (VALISONE) 0.1 % cream   2    exemestane (AROMASIN) 25 MG chemo tablet Take 25 mg by mouth daily      hydrOXYzine (ATARAX) 50 MG tablet   2    escitalopram (LEXAPRO) 10 MG tablet Take 10 mg by mouth daily      zolpidem (AMBIEN) 10 MG tablet Take by mouth nightly as needed for Sleep      INVOKAMET 150-1000 MG tablet 1 tablet 2 times daily   0    CPAP Machine MISC 10 cm by Does not apply route nightly.  OXYGEN Inhale 2 L into the lungs nightly.  exenatide (BYDUREON) 2 MG SUSR injection Inject 2 mg into the skin once a week.  simvastatin (ZOCOR) 20 MG tablet Take 20 mg by mouth nightly.  levothyroxine (SYNTHROID) 200 MCG tablet Take 200 mcg by mouth Daily.  glimepiride (AMARYL) 4 MG tablet Take 4 mg by mouth 2 times daily.  isosorbide mononitrate (IMDUR) 30 MG CR tablet Take 30 mg by mouth nightly       cloNIDine (CATAPRES) 0.2 MG tablet Take 0.2 mg by mouth 3 times daily. Current Facility-Administered Medications on File Prior to Encounter   Medication Dose Route Frequency Provider Last Rate Last Dose    butamben-tetracaine-benzocaine (CETACAINE) spray 1 spray  1 spray Topical Once Karely Guzmán MD           REVIEW OF SYSTEMS    Pertinent items are noted in HPI. Constitutional: Negative for systemic symptoms including fever, chills and malaise. Objective:      BP (!) 205/82   Pulse 80   Temp 96.9 °F (36.1 °C) (Temporal)   Resp 16   LMP 01/03/2012     PHYSICAL EXAM      General: The patient is in no acute distress. Mental status:  Patient is appropriate, is  oriented to place and plan of care. Dermatologic exam: Visual inspection of the periwound reveals the skin to be normal in turgor and texture. Wound exam:  see wound description below     All active wounds listed below with today's date are evaluated      Wound 12/08/17 WOUND #7 LEFT ABD(ONSET 1 WK) (Active)   Dressing Status Clean;Dry; Intact 12/15/2017  4:18 PM   Dressing Changed Changed/New 12/15/2017  4:18 PM   Wound Cleansed Rinsed/Irrigated with saline 12/22/2017  2:45 PM   Wound Length (cm) 6.5 cm 12/22/2017  2:45 PM   Wound Width (cm) 7 cm 12/22/2017  2:45 PM   Wound Depth (cm)  0.1 12/22/2017  2:45 PM   Calculated Wound Size (cm^2) (l*w) 45.5 cm^2 12/22/2017  2:45 PM   Change in Wound Size % (l*w) -84.21 12/22/2017  2:45 PM   Distance Tunneling (cm) 0 cm 12/22/2017  2:45 PM   Tunneling Position ___ O'Clock 0 12/22/2017  2:45 PM   Undermining Starts ___ O'Clock 0 12/22/2017  2:45 PM   Undermining Ends___ O'Clock 0 12/22/2017  2:45 PM   Undermining Maxium Distance (cm) 0 12/22/2017  2:45 PM   Wound Assessment Red 12/22/2017  2:45 PM   Drainage Amount Moderate 12/22/2017  2:45 PM   Drainage Description Serosanguinous 12/22/2017  2:45 PM   Odor None 12/22/2017  2:45 PM   Margins Defined edges 12/22/2017  2:45 PM   Viola-wound Assessment Red 12/22/2017  2:45 PM   Non-staged Wound Description Full thickness 12/22/2017  2:45 PM   [place]%Wound Bed 0 12/22/2017  2:45 PM   Red%Wound Bed 100 12/22/2017  2:45 PM   Yellow%Wound Bed 0 12/22/2017  2:45 PM   Black%Wound Bed 0 12/22/2017  2:45 PM   Purple%Wound Bed 0 12/22/2017  2:45 PM   Other%Wound Bed 0 12/22/2017  2:45 PM   Number of days: 14       Assessment:       Problem List Items Addressed This Visit     WD-Ulcer of abdomen wall with fat layer exposed ([address]    WD-Unspecified open wound of abdominal wall, right lower quadrant without penetration into peritoneal cavity, initial encounter    WD-Local infection of skin and subcutaneous tissue    RESOLVED: WD-Chronic ulcer of right thigh with fat layer exposed ([address] - Primary      Other Visit Diagnoses    None. Status of wound progress and description from last visit:   Her wounds are greatly improved today. Much less drainage, the redness is improved, the skin is healing. Many of the ulcers are healed today.         Plan:     Discharge Instructions       PHYSICIAN ORDERS AND DISCHARGE INSTRUCTIONS     NOTE: Upon discharge from the [address] [facility] [address], you will receive a patient experience survey. We would be grateful if you would take the time to fill this survey out.     Wound care order history:                 Imaging:  Date                  Cultures:  DONE 12/8/17              Labs/ HbA1c:   Date               Grafts:  Date                  Earlier Wound care treatments: INTERDRY              Authorizations:                        Consults:   Date                           Primary care physician:      Continuing wound care orders and information:              Residence:                Continue home health care with:               Your wound-care supplies will be provided by: Zenon Richter provider:              Compression with  Ronald Stabs loading:  Date               [facility] Medications:  RX DOXYCYCLINE BID X 10 DAY--12/8/17  RX CIPRO BID X 10 DAYS--12/15/17              [facility] cleansing:                           DI not scrub or use excessive force.                          Wash hands with soap and water before and after dressing changes.                         Prior to applying a clean dressing, cleanse wound with normal saline,                                wound cleanser, or mild soap and water.                           Ask the physician or nurse before getting the wound(s) wet in a shower              Daily Wound management:                          Keep weight off wounds and reposition every 2 hours.                          EOXQF standing for long periods of time.                          OZTGU wraps/stockings in AM and remove at bedtime.                          If swelling is present, elevate legs to the level of the heart or above for 30  minutes 4-5 times a day and/or when sitting.                                                  When taking antibiotics take entire prescription as ordered by physician do not stop taking until medicine is all gone.

## 2017-12-27 ENCOUNTER — HOSPITAL ENCOUNTER (OUTPATIENT)
Dept: WOMENS IMAGING | Age: 57
Discharge: HOME | End: 2018-01-25
Attending: INTERNAL MEDICINE | Admitting: INTERNAL MEDICINE

## 2017-12-27 DIAGNOSIS — C50.412 MALIGNANT NEOPLASM OF UPPER-OUTER QUADRANT OF LEFT FEMALE BREAST (HCC): ICD-10-CM

## 2017-12-27 DIAGNOSIS — Z13.820 ENCOUNTER FOR SCREENING FOR OSTEOPOROSIS: ICD-10-CM

## 2017-12-29 ENCOUNTER — HOSPITAL ENCOUNTER (OUTPATIENT)
Dept: WOUND CARE | Age: 57
Discharge: HOME | End: 2017-12-29
Attending: INTERNAL MEDICINE | Admitting: INTERNAL MEDICINE

## 2017-12-29 VITALS
SYSTOLIC BLOOD PRESSURE: 172 MMHG | RESPIRATION RATE: 16 BRPM | TEMPERATURE: 98.2 F | DIASTOLIC BLOOD PRESSURE: 83 MMHG | HEART RATE: 80 BPM

## 2017-12-29 DIAGNOSIS — S31.103A UNSPECIFIED OPEN WOUND OF ABDOMINAL WALL, RIGHT LOWER QUADRANT WITHOUT PENETRATION INTO PERITONEAL CAVITY, INITIAL ENCOUNTER: Primary | ICD-10-CM

## 2017-12-29 DIAGNOSIS — Z13.820 ENCOUNTER FOR SCREENING FOR OSTEOPOROSIS: ICD-10-CM

## 2017-12-29 DIAGNOSIS — C50.412 MALIGNANT NEOPLASM OF UPPER-OUTER QUADRANT OF LEFT FEMALE BREAST (HCC): ICD-10-CM

## 2017-12-29 DIAGNOSIS — L98.492 ULCER OF ABDOMEN WALL WITH FAT LAYER EXPOSED (HCC): ICD-10-CM

## 2017-12-29 DIAGNOSIS — L08.9 LOCAL INFECTION OF SKIN AND SUBCUTANEOUS TISSUE: ICD-10-CM

## 2018-01-04 NOTE — PROGRESS NOTES
daily      hydrOXYzine (ATARAX) 50 MG tablet   2    escitalopram (LEXAPRO) 10 MG tablet Take 10 mg by mouth daily      zolpidem (AMBIEN) 10 MG tablet Take by mouth nightly as needed for Sleep      INVOKAMET 150-1000 MG tablet 1 tablet 2 times daily   0    CPAP Machine MISC 10 cm by Does not apply route nightly.  OXYGEN Inhale 2 L into the lungs nightly.  exenatide (BYDUREON) 2 MG SUSR injection Inject 2 mg into the skin once a week.  simvastatin (ZOCOR) 20 MG tablet Take 20 mg by mouth nightly.  levothyroxine (SYNTHROID) 200 MCG tablet Take 200 mcg by mouth Daily.  glimepiride (AMARYL) 4 MG tablet Take 4 mg by mouth 2 times daily.  isosorbide mononitrate (IMDUR) 30 MG CR tablet Take 30 mg by mouth nightly       cloNIDine (CATAPRES) 0.2 MG tablet Take 0.2 mg by mouth 3 times daily. Current Facility-Administered Medications on File Prior to Encounter   Medication Dose Route Frequency Provider Last Rate Last Dose    butamben-tetracaine-benzocaine (CETACAINE) spray 1 spray  1 spray Topical Once Karely Guzmán MD           REVIEW OF SYSTEMS    Pertinent items are noted in HPI. Constitutional: Negative for systemic symptoms including fever, chills and malaise. Objective:      BP (!) 172/83   Pulse 80   Temp 98.2 °F (36.8 °C) (Oral)   Resp 16   LMP 01/03/2012     PHYSICAL EXAM      General: The patient is in no acute distress. Mental status:  Patient is appropriate, is  oriented to place and plan of care. Dermatologic exam: Visual inspection of the periwound reveals the skin to be moist.  Wound exam:  see wound description below     All active wounds listed below with today's date are evaluated      Wound 12/08/17 WOUND #7 LEFT ABD(ONSET 1 WK) (Active)   Dressing Status Clean;Dry; Intact 12/29/2017  4:08 PM   Dressing Changed Changed/New 12/29/2017  4:08 PM   Wound Cleansed Rinsed/Irrigated with saline 12/22/2017  2:45 PM   Wound Length (cm) 1 cm 12/29/2017 2:37 PM   Wound Width (cm) 7 cm 12/29/2017  2:37 PM   Wound Depth (cm)  0.1 12/29/2017  2:37 PM   Calculated Wound Size (cm^2) (l*w) 7 cm^2 12/29/2017  2:37 PM   Change in Wound Size % (l*w) 71.66 12/29/2017  2:37 PM   Distance Tunneling (cm) 0 cm 12/29/2017  2:37 PM   Tunneling Position ___ O'Clock 0 12/29/2017  2:37 PM   Undermining Starts ___ O'Clock 0 12/29/2017  2:37 PM   Undermining Ends___ O'Clock 0 12/29/2017  2:37 PM   Undermining [place] Distance (cm) 0 12/29/2017  2:37 PM   Wound Assessment Red 12/29/2017  2:37 PM   Drainage Amount Moderate 12/29/2017  2:37 PM   Drainage Description Serosanguinous 12/29/2017  2:37 PM   Odor None 12/29/2017  2:37 PM   Margins Defined edges 12/29/2017  2:37 PM   Viola-wound Assessment Red 12/29/2017  2:37 PM   Non-staged Wound Description Full thickness 12/29/2017  2:37 PM   [place]%Wound Bed 0 12/29/2017  2:37 PM   Red%Wound Bed 100 12/29/2017  2:37 PM   Yellow%Wound Bed 0 12/29/2017  2:37 PM   Black%Wound Bed 0 12/29/2017  2:37 PM   Purple%Wound Bed 0 12/29/2017  2:37 PM   Other%Wound Bed 0 12/29/2017  2:37 PM   Number of days: 26       Assessment:       Problem List Items Addressed This Visit     WD-Ulcer of abdomen wall with fat layer exposed ([address]    WD-Unspecified open wound of abdominal wall, right lower quadrant without penetration into peritoneal cavity, initial encounter - Primary    WD-Local infection of skin and subcutaneous tissue      Other Visit Diagnoses     Malignant neoplasm of upper-outer quadrant of left female breast ([address]        Encounter for screening for osteoporosis              Status of wound progress and description from last visit:   Wounds are almost closed. The entire area is much less red, much less irritated. Plan:     Discharge Instructions       PHYSICIAN ORDERS AND DISCHARGE INSTRUCTIONS     NOTE: Upon discharge from the [address], you will receive a patient experience survey.  We would be grateful if you would take the time to fill this survey out.     Wound care order history:                 Imaging:  Date                  Cultures:  DONE 12/8/17              Labs/ HbA1c:   Date               Grafts:  Date                  Earlier Wound care treatments: INTERDRY              Authorizations:                        Consults:   Date                           Primary care physician:      Continuing wound care orders and information:              Residence:                Continue home health care with:               Your wound-care supplies will be provided by: Carlos Arshad provider:              Compression with  Ramonia Gutiérrez loading:  Date               [facility] Medications:  RX DOXYCYCLINE BID X 10 DAY--12/8/17  RX CIPRO BID X 10 DAYS--12/15/17              [facility] cleansing:                           FR not scrub or use excessive force.                          Wash hands with soap and water before and after dressing changes.                         Prior to applying a clean dressing, cleanse wound with normal saline,                                wound cleanser, or mild soap and water.                           Ask the physician or nurse before getting the wound(s) wet in a shower              Daily Wound management:                          Keep weight off wounds and reposition every 2 hours.                          CQWKM standing for long periods of time.                          BRDRZ wraps/stockings in AM and remove at bedtime.                          If swelling is present, elevate legs to the level of the heart or above for 30  minutes 4-5 times a day and/or when sitting.                                                  When taking antibiotics take entire prescription as ordered by physician do not stop taking until medicine is all gone.                                                                 Orders for this week:12/29/17     LEFT ABDOMEN CLUSTER --  DANKINS DAMPENED FLUFFED 4X4,ABD. HOLD IN PLACE WITH CLOTHES. MAY USE XINC OXIDE TO REDDENED AREAS AROUND WOUNDS     Follow up with Dr Crispin Goldman 1 weeks in the wound care center  Call [phone] for any questions or concerns.   Date__________   Time____________        Treatment Note Wound 12/08/17 WOUND #7 LEFT ABD(ONSET 1 WK)-Dressing/Treatment:  ([place] damp 4x4)    Written Patient Dismissal Instructions Given            Electronically signed by Bree Krueger MD on 1/4/2018 at 12:07 PM

## 2018-01-05 ENCOUNTER — HOSPITAL ENCOUNTER (OUTPATIENT)
Dept: WOUND CARE | Age: 58
Discharge: HOME | End: 2018-01-05
Attending: INTERNAL MEDICINE | Admitting: INTERNAL MEDICINE

## 2018-01-05 VITALS
TEMPERATURE: 97 F | DIASTOLIC BLOOD PRESSURE: 56 MMHG | SYSTOLIC BLOOD PRESSURE: 200 MMHG | HEART RATE: 79 BPM | RESPIRATION RATE: 16 BRPM

## 2018-01-05 DIAGNOSIS — C50.412 MALIGNANT NEOPLASM OF UPPER-OUTER QUADRANT OF LEFT FEMALE BREAST (HCC): ICD-10-CM

## 2018-01-05 DIAGNOSIS — L98.492 ULCER OF ABDOMEN WALL WITH FAT LAYER EXPOSED (HCC): ICD-10-CM

## 2018-01-05 DIAGNOSIS — Z13.820 ENCOUNTER FOR SCREENING FOR OSTEOPOROSIS: ICD-10-CM

## 2018-01-05 DIAGNOSIS — S31.103A UNSPECIFIED OPEN WOUND OF ABDOMINAL WALL, RIGHT LOWER QUADRANT WITHOUT PENETRATION INTO PERITONEAL CAVITY, INITIAL ENCOUNTER: Primary | ICD-10-CM

## 2018-01-05 RX ORDER — SODIUM HYPOCHLORITE 2.5 MG/ML
SOLUTION TOPICAL
Qty: 473 ML | Refills: 0 | Status: SHIPPED | OUTPATIENT
Start: 2018-01-05 | End: 2018-01-12

## 2018-01-05 NOTE — PROGRESS NOTES
(AROMASIN) 25 MG chemo tablet Take 25 mg by mouth daily      hydrOXYzine (ATARAX) 50 MG tablet   2    escitalopram (LEXAPRO) 10 MG tablet Take 10 mg by mouth daily      zolpidem (AMBIEN) 10 MG tablet Take by mouth nightly as needed for Sleep      INVOKAMET 150-1000 MG tablet 1 tablet 2 times daily   0    CPAP Machine MISC 10 cm by Does not apply route nightly.  OXYGEN Inhale 2 L into the lungs nightly.  exenatide (BYDUREON) 2 MG SUSR injection Inject 2 mg into the skin once a week.  simvastatin (ZOCOR) 20 MG tablet Take 20 mg by mouth nightly.  levothyroxine (SYNTHROID) 200 MCG tablet Take 200 mcg by mouth Daily.  glimepiride (AMARYL) 4 MG tablet Take 4 mg by mouth 2 times daily.  isosorbide mononitrate (IMDUR) 30 MG CR tablet Take 30 mg by mouth nightly       cloNIDine (CATAPRES) 0.2 MG tablet Take 0.2 mg by mouth 3 times daily. Current Facility-Administered Medications on File Prior to Encounter   Medication Dose Route Frequency Provider Last Rate Last Dose    butamben-tetracaine-benzocaine (CETACAINE) spray 1 spray  1 spray Topical Once Ahsane MD Noelle           REVIEW OF SYSTEMS    Pertinent items are noted in HPI. Constitutional: Negative for systemic symptoms including fever, chills and malaise. Objective:      BP (!) 200/56   Pulse 79   Temp 97 °F (36.1 °C) (Temporal)   Resp 16   LMP 01/03/2012     PHYSICAL EXAM      General: The patient is in no acute distress. Mental status:  Patient is appropriate, is  oriented to place and plan of care. Dermatologic exam: Visual inspection of the periwound reveals the skin to be normal in turgor and texture.   Wound exam:  see wound description below     All active wounds listed below with today's date are evaluated           Assessment:       Problem List Items Addressed This Visit     WD-Ulcer of abdomen wall with fat layer exposed ([address]    WD-Unspecified open wound of abdominal wall, right lower quadrant

## 2018-01-08 ENCOUNTER — TELEPHONE (OUTPATIENT)
Dept: GASTROENTEROLOGY | Age: 58
End: 2018-01-08

## 2018-02-01 ENCOUNTER — HOSPITAL ENCOUNTER (OUTPATIENT)
Dept: OTHER | Age: 58
Discharge: HOME | End: 2018-02-01
Attending: INTERNAL MEDICINE | Admitting: INTERNAL MEDICINE

## 2018-02-01 LAB
ALBUMIN SERPL-MCNC: 3.5 GM/DL (ref 3.4–5)
ALP BLD-CCNC: 96 IU/L (ref 40–129)
ALT SERPL-CCNC: 19 U/L (ref 10–40)
ANION GAP SERPL CALCULATED.3IONS-SCNC: 12 MMOL/L (ref 4–16)
AST SERPL-CCNC: 34 IU/L (ref 15–37)
BILIRUB SERPL-MCNC: 0.7 MG/DL (ref 0–1)
BUN BLDV-MCNC: 17 MG/DL (ref 6–23)
CALCIUM SERPL-MCNC: 8.9 MG/DL (ref 8.3–10.6)
CHLORIDE BLD-SCNC: 104 MMOL/L (ref 99–110)
CO2: 25 MMOL/L (ref 21–32)
CREAT SERPL-MCNC: 1.1 MG/DL (ref 0.6–1.1)
FERRITIN: 47 NG/ML (ref 15–150)
GFR AFRICAN AMERICAN: >60 ML/MIN/1.73M2
GFR NON-AFRICAN AMERICAN: 52 ML/MIN/1.73M2
GLUCOSE BLD-MCNC: 178 MG/DL (ref 70–99)
IRON: 61 UG/DL (ref 37–145)
PCT TRANSFERRIN: 15 % (ref 10–44)
POTASSIUM SERPL-SCNC: 4.9 MMOL/L (ref 3.5–5.1)
SODIUM BLD-SCNC: 141 MMOL/L (ref 135–145)
TOTAL IRON BINDING CAPACITY: 403 UG/DL (ref 250–450)
TOTAL PROTEIN: 6.3 GM/DL (ref 6.4–8.2)
UNSATURATED IRON BINDING CAPACITY: 342 UG/DL (ref 110–370)

## 2018-03-02 ENCOUNTER — HOSPITAL ENCOUNTER (OUTPATIENT)
Dept: GENERAL RADIOLOGY | Age: 58
Discharge: HOME | End: 2018-03-02
Attending: INTERNAL MEDICINE | Admitting: SPECIALIST

## 2018-03-02 DIAGNOSIS — C50.412 MALIGNANT NEOPLASM OF UPPER-OUTER QUADRANT OF LEFT FEMALE BREAST (HCC): ICD-10-CM

## 2018-03-02 DIAGNOSIS — R10.9 STOMACH ACHE: ICD-10-CM

## 2018-03-02 DIAGNOSIS — Z13.820 ENCOUNTER FOR SCREENING FOR OSTEOPOROSIS: ICD-10-CM

## 2018-03-09 ENCOUNTER — HOSPITAL ENCOUNTER (OUTPATIENT)
Dept: GENERAL RADIOLOGY | Age: 58
Discharge: HOME | End: 2018-03-09
Attending: INTERNAL MEDICINE | Admitting: INTERNAL MEDICINE

## 2018-03-09 DIAGNOSIS — R07.81 PLEURITIC CHEST PAIN: ICD-10-CM

## 2018-05-01 ENCOUNTER — HOSPITAL ENCOUNTER (OUTPATIENT)
Dept: OTHER | Age: 58
Discharge: HOME | End: 2018-05-01
Attending: INTERNAL MEDICINE | Admitting: INTERNAL MEDICINE

## 2018-05-01 LAB
ALBUMIN SERPL-MCNC: 3.6 GM/DL (ref 3.4–5)
ALP BLD-CCNC: 119 IU/L (ref 40–129)
ALT SERPL-CCNC: 23 U/L (ref 10–40)
ANION GAP SERPL CALCULATED.3IONS-SCNC: 13 MMOL/L (ref 4–16)
AST SERPL-CCNC: 34 IU/L (ref 15–37)
BILIRUB SERPL-MCNC: 0.5 MG/DL (ref 0–1)
BUN BLDV-MCNC: 24 MG/DL (ref 6–23)
CALCIUM SERPL-MCNC: 9.1 MG/DL (ref 8.3–10.6)
CHLORIDE BLD-SCNC: 102 MMOL/L (ref 99–110)
CO2: 23 MMOL/L (ref 21–32)
CREAT SERPL-MCNC: 1.2 MG/DL (ref 0.6–1.1)
FERRITIN: 17 NG/ML (ref 15–150)
GFR AFRICAN AMERICAN: 56 ML/MIN/1.73M2
GFR NON-AFRICAN AMERICAN: 46 ML/MIN/1.73M2
GLUCOSE BLD-MCNC: 243 MG/DL (ref 70–99)
IRON: 35 UG/DL (ref 37–145)
PCT TRANSFERRIN: 8 % (ref 10–44)
POTASSIUM SERPL-SCNC: 5.1 MMOL/L (ref 3.5–5.1)
SODIUM BLD-SCNC: 138 MMOL/L (ref 135–145)
TOTAL IRON BINDING CAPACITY: 442 UG/DL (ref 250–450)
TOTAL PROTEIN: 6.4 GM/DL (ref 6.4–8.2)
UNSATURATED IRON BINDING CAPACITY: 407 UG/DL (ref 110–370)

## 2018-06-12 ENCOUNTER — HOSPITAL ENCOUNTER (OUTPATIENT)
Dept: CT IMAGING | Age: 58
Discharge: HOME | End: 2018-06-12
Attending: SURGERY | Admitting: SURGERY

## 2018-06-12 DIAGNOSIS — R59.1 LYMPHADENOPATHY: ICD-10-CM

## 2018-06-12 DIAGNOSIS — R59.0 LOCALIZED ENLARGED LYMPH NODES: ICD-10-CM

## 2018-06-12 RX ORDER — SODIUM CHLORIDE 0.9 % (FLUSH) 0.9 %
10 SYRINGE (ML) INJECTION 2 TIMES DAILY
Status: DISCONTINUED | OUTPATIENT
Start: 2018-06-12 | End: 2018-06-13 | Stop reason: HOSPADM

## 2018-06-12 RX ADMIN — Medication 10 ML: at 12:19

## 2018-06-19 PROBLEM — K75.81 LIVER CIRRHOSIS SECONDARY TO NASH (NONALCOHOLIC STEATOHEPATITIS) (HCC): Status: ACTIVE | Noted: 2018-06-19

## 2018-06-19 PROBLEM — K74.60 LIVER CIRRHOSIS SECONDARY TO NASH (NONALCOHOLIC STEATOHEPATITIS) (HCC): Status: ACTIVE | Noted: 2018-06-19

## 2018-09-07 PROBLEM — I50.43 CHF (CONGESTIVE HEART FAILURE), NYHA CLASS I, ACUTE ON CHRONIC, COMBINED (HCC): Status: ACTIVE | Noted: 2018-09-07

## 2018-09-15 ENCOUNTER — HOSPITAL ENCOUNTER (OUTPATIENT)
Dept: LAB | Age: 58
Discharge: HOME | End: 2018-09-15
Attending: INTERNAL MEDICINE | Admitting: INTERNAL MEDICINE

## 2018-09-15 LAB
ALBUMIN SERPL-MCNC: 3.3 GM/DL (ref 3.4–5)
ALP BLD-CCNC: 121 IU/L (ref 40–128)
ALT SERPL-CCNC: 24 U/L (ref 10–40)
ANION GAP SERPL CALCULATED.3IONS-SCNC: 10 MMOL/L (ref 4–16)
AST SERPL-CCNC: 30 IU/L (ref 15–37)
BASOPHILS ABSOLUTE: 0.1 K/CU MM
BASOPHILS RELATIVE PERCENT: 0.6 % (ref 0–1)
BILIRUB SERPL-MCNC: 1 MG/DL (ref 0–1)
BUN BLDV-MCNC: 17 MG/DL (ref 6–23)
CALCIUM SERPL-MCNC: 8.4 MG/DL (ref 8.3–10.6)
CHLORIDE BLD-SCNC: 107 MMOL/L (ref 99–110)
CHOLESTEROL: 135 MG/DL
CO2: 25 MMOL/L (ref 21–32)
CREAT SERPL-MCNC: 1.1 MG/DL (ref 0.6–1.1)
CREATININE URINE: 209.1 MG/DL (ref 28–217)
DIFFERENTIAL TYPE: ABNORMAL
EOSINOPHILS ABSOLUTE: 0.3 K/CU MM
EOSINOPHILS RELATIVE PERCENT: 3.9 % (ref 0–3)
ESTIMATED AVERAGE GLUCOSE: 134 MG/DL
GFR AFRICAN AMERICAN: >60 ML/MIN/1.73M2
GFR NON-AFRICAN AMERICAN: 51 ML/MIN/1.73M2
GLUCOSE BLD-MCNC: 148 MG/DL (ref 70–99)
HBA1C MFR BLD: 6.3 % (ref 4.2–6.3)
HCT VFR BLD CALC: 33 % (ref 37–47)
HDLC SERPL-MCNC: 60 MG/DL
HEMOGLOBIN: 9.3 GM/DL (ref 12.5–16)
IMMATURE NEUTROPHIL %: 1.6 % (ref 0–0.43)
LDL CHOLESTEROL DIRECT: 72 MG/DL
LYMPHOCYTES ABSOLUTE: 0.7 K/CU MM
LYMPHOCYTES RELATIVE PERCENT: 8.4 % (ref 24–44)
MCH RBC QN AUTO: 23.9 PG (ref 27–31)
MCHC RBC AUTO-ENTMCNC: 28.2 % (ref 32–36)
MCV RBC AUTO: 84.8 FL (ref 78–100)
MICROALBUMIN/CREAT 24H UR: 77.7 MG/DL
MICROALBUMIN/CREAT UR-RTO: 371.6 MG/G CREAT (ref 0–30)
MONOCYTES ABSOLUTE: 0.8 K/CU MM
MONOCYTES RELATIVE PERCENT: 10.5 % (ref 0–4)
NUCLEATED RBC %: 0 %
PDW BLD-RTO: 21.8 % (ref 11.7–14.9)
PLATELET # BLD: 137 K/CU MM (ref 140–440)
PMV BLD AUTO: 10.2 FL (ref 7.5–11.1)
POTASSIUM SERPL-SCNC: 4.3 MMOL/L (ref 3.5–5.1)
RBC # BLD: 3.89 M/CU MM (ref 4.2–5.4)
SEGMENTED NEUTROPHILS ABSOLUTE COUNT: 5.8 K/CU MM
SEGMENTED NEUTROPHILS RELATIVE PERCENT: 75 % (ref 36–66)
SODIUM BLD-SCNC: 142 MMOL/L (ref 135–145)
T4 FREE: 1.29 NG/DL (ref 0.9–1.8)
TOTAL IMMATURE NEUTOROPHIL: 0.12 K/CU MM
TOTAL NUCLEATED RBC: 0 K/CU MM
TOTAL PROTEIN: 5.9 GM/DL (ref 6.4–8.2)
TRIGL SERPL-MCNC: 65 MG/DL
TSH HIGH SENSITIVITY: 2.15 UIU/ML (ref 0.27–4.2)
WBC # BLD: 7.7 K/CU MM (ref 4–10.5)

## 2018-09-18 ENCOUNTER — HOSPITAL ENCOUNTER (OUTPATIENT)
Dept: OTHER | Age: 58
Discharge: HOME | End: 2018-09-18
Attending: SPECIALIST | Admitting: SPECIALIST

## 2018-09-27 ENCOUNTER — HOSPITAL ENCOUNTER (OUTPATIENT)
Age: 58
Setting detail: SPECIMEN
Discharge: HOME | End: 2018-09-27
Payer: COMMERCIAL

## 2018-09-27 LAB
ALBUMIN SERPL-MCNC: 3.4 GM/DL (ref 3.4–5)
ALP BLD-CCNC: 115 IU/L (ref 40–129)
ALT SERPL-CCNC: 28 U/L (ref 10–40)
ANION GAP SERPL CALCULATED.3IONS-SCNC: 11 MMOL/L (ref 4–16)
AST SERPL-CCNC: 44 IU/L (ref 15–37)
BILIRUB SERPL-MCNC: 0.5 MG/DL (ref 0–1)
BUN BLDV-MCNC: 15 MG/DL (ref 6–23)
CALCIUM SERPL-MCNC: 8.8 MG/DL (ref 8.3–10.6)
CHLORIDE BLD-SCNC: 104 MMOL/L (ref 99–110)
CO2: 27 MMOL/L (ref 21–32)
CREAT SERPL-MCNC: 1.1 MG/DL (ref 0.6–1.1)
FERRITIN: 111 NG/ML (ref 15–150)
GFR AFRICAN AMERICAN: >60 ML/MIN/1.73M2
GFR NON-AFRICAN AMERICAN: 51 ML/MIN/1.73M2
GLUCOSE BLD-MCNC: 110 MG/DL (ref 70–99)
IRON: 57 UG/DL (ref 37–145)
PCT TRANSFERRIN: 14 % (ref 10–44)
POTASSIUM SERPL-SCNC: 5 MMOL/L (ref 3.5–5.1)
SODIUM BLD-SCNC: 142 MMOL/L (ref 135–145)
TOTAL IRON BINDING CAPACITY: 408 UG/DL (ref 250–450)
TOTAL PROTEIN: 7.1 GM/DL (ref 6.4–8.2)
UNSATURATED IRON BINDING CAPACITY: 351 UG/DL (ref 110–370)

## 2018-09-27 PROCEDURE — 83540 ASSAY OF IRON: CPT

## 2018-09-27 PROCEDURE — 83550 IRON BINDING TEST: CPT

## 2018-09-27 PROCEDURE — 80053 COMPREHEN METABOLIC PANEL: CPT

## 2018-09-27 PROCEDURE — 82728 ASSAY OF FERRITIN: CPT

## 2018-12-13 ENCOUNTER — HOSPITAL ENCOUNTER (OUTPATIENT)
Age: 58
Setting detail: SPECIMEN
Discharge: HOME | End: 2018-12-13
Payer: COMMERCIAL

## 2018-12-13 LAB
FERRITIN: 13 NG/ML (ref 15–150)
IRON: 32 UG/DL (ref 37–145)
PCT TRANSFERRIN: 8 % (ref 10–44)
TOTAL IRON BINDING CAPACITY: 418 UG/DL (ref 250–450)
UNSATURATED IRON BINDING CAPACITY: 386 UG/DL (ref 110–370)

## 2018-12-13 PROCEDURE — 83540 ASSAY OF IRON: CPT

## 2018-12-13 PROCEDURE — 83550 IRON BINDING TEST: CPT

## 2018-12-13 PROCEDURE — 82728 ASSAY OF FERRITIN: CPT

## 2019-01-01 ENCOUNTER — APPOINTMENT (OUTPATIENT)
Dept: NUCLEAR MEDICINE | Age: 59
DRG: 191 | End: 2019-01-01
Payer: COMMERCIAL

## 2019-01-01 ENCOUNTER — APPOINTMENT (OUTPATIENT)
Dept: CT IMAGING | Age: 59
DRG: 191 | End: 2019-01-01
Payer: COMMERCIAL

## 2019-01-01 ENCOUNTER — HOSPITAL ENCOUNTER (OUTPATIENT)
Age: 59
Setting detail: SPECIMEN
Discharge: HOME | End: 2019-12-13
Payer: COMMERCIAL

## 2019-01-01 ENCOUNTER — APPOINTMENT (OUTPATIENT)
Dept: GENERAL RADIOLOGY | Age: 59
DRG: 191 | End: 2019-01-01
Payer: COMMERCIAL

## 2019-01-01 ENCOUNTER — HOSPITAL ENCOUNTER (OUTPATIENT)
Age: 59
Discharge: HOME | End: 2019-10-18
Payer: COMMERCIAL

## 2019-01-01 ENCOUNTER — HOSPITAL ENCOUNTER (INPATIENT)
Age: 59
LOS: 1 days | Discharge: HOME | DRG: 191 | End: 2019-10-28
Attending: EMERGENCY MEDICINE | Admitting: INTERNAL MEDICINE
Payer: COMMERCIAL

## 2019-01-01 ENCOUNTER — HOSPITAL ENCOUNTER (OUTPATIENT)
Age: 59
Setting detail: SPECIMEN
Discharge: HOME | End: 2019-05-21
Payer: COMMERCIAL

## 2019-01-01 ENCOUNTER — HOSPITAL ENCOUNTER (OUTPATIENT)
Age: 59
Setting detail: SPECIMEN
Discharge: HOME | End: 2019-11-04
Payer: COMMERCIAL

## 2019-01-01 VITALS
HEIGHT: 63 IN | SYSTOLIC BLOOD PRESSURE: 167 MMHG | OXYGEN SATURATION: 94 % | HEART RATE: 87 BPM | RESPIRATION RATE: 27 BRPM | DIASTOLIC BLOOD PRESSURE: 67 MMHG | TEMPERATURE: 98 F | BODY MASS INDEX: 51.91 KG/M2 | WEIGHT: 293 LBS

## 2019-01-01 DIAGNOSIS — R07.9 CHEST PAIN, UNSPECIFIED TYPE: Primary | ICD-10-CM

## 2019-01-01 DIAGNOSIS — R06.02 SHORTNESS OF BREATH: ICD-10-CM

## 2019-01-01 LAB
ABO/RH: NORMAL
ADENOVIRUS DETECTION BY PCR: NOT DETECTED
ALBUMIN SERPL-MCNC: 3.4 GM/DL (ref 3.4–5)
ALBUMIN SERPL-MCNC: 3.8 GM/DL (ref 3.4–5)
ALP BLD-CCNC: 138 IU/L (ref 40–129)
ALP BLD-CCNC: 139 IU/L (ref 40–129)
ALT SERPL-CCNC: 25 U/L (ref 10–40)
ALT SERPL-CCNC: 28 U/L (ref 10–40)
ANION GAP SERPL CALCULATED.3IONS-SCNC: 10 MMOL/L (ref 4–16)
ANION GAP SERPL CALCULATED.3IONS-SCNC: 10 MMOL/L (ref 4–16)
ANION GAP SERPL CALCULATED.3IONS-SCNC: 8 MMOL/L (ref 4–16)
ANTIBODY SCREEN: NEGATIVE
AST SERPL-CCNC: 41 IU/L (ref 15–37)
AST SERPL-CCNC: 41 IU/L (ref 15–37)
BASOPHILS ABSOLUTE: 0 K/CU MM
BASOPHILS ABSOLUTE: 0 K/CU MM
BASOPHILS RELATIVE PERCENT: 0.8 % (ref 0–1)
BASOPHILS RELATIVE PERCENT: 0.9 % (ref 0–1)
BILIRUB SERPL-MCNC: 0.7 MG/DL (ref 0–1)
BILIRUB SERPL-MCNC: 0.8 MG/DL (ref 0–1)
BILIRUBIN DIRECT: 0.3 MG/DL (ref 0–0.3)
BILIRUBIN, INDIRECT: 0.5 MG/DL (ref 0–0.7)
BORDETELLA PERTUSSIS PCR: NOT DETECTED
BUN BLDV-MCNC: 17 MG/DL (ref 6–23)
BUN BLDV-MCNC: 19 MG/DL (ref 6–23)
BUN BLDV-MCNC: 20 MG/DL (ref 6–23)
CALCIUM SERPL-MCNC: 9 MG/DL (ref 8.3–10.6)
CHLAMYDOPHILA PNEUMONIA PCR: NOT DETECTED
CHLORIDE BLD-SCNC: 102 MMOL/L (ref 99–110)
CHLORIDE BLD-SCNC: 103 MMOL/L (ref 99–110)
CHLORIDE BLD-SCNC: 104 MMOL/L (ref 99–110)
CHOLESTEROL: 123 MG/DL
CO2: 25 MMOL/L (ref 21–32)
CORONAVIRUS 229E PCR: NOT DETECTED
CORONAVIRUS HKU1 PCR: NOT DETECTED
CORONAVIRUS NL63 PCR: NOT DETECTED
CORONAVIRUS OC43 PCR: NOT DETECTED
CREAT SERPL-MCNC: 0.9 MG/DL (ref 0.6–1.1)
CREAT SERPL-MCNC: 0.9 MG/DL (ref 0.6–1.1)
CREAT SERPL-MCNC: 1 MG/DL (ref 0.6–1.1)
DIFFERENTIAL TYPE: ABNORMAL
DIFFERENTIAL TYPE: ABNORMAL
EKG ATRIAL RATE: 85 BPM
EKG DIAGNOSIS: NORMAL
EKG P AXIS: -10 DEGREES
EKG P-R INTERVAL: 174 MS
EKG Q-T INTERVAL: 388 MS
EKG QRS DURATION: 96 MS
EKG QTC CALCULATION (BAZETT): 461 MS
EKG R AXIS: 2 DEGREES
EKG T AXIS: 30 DEGREES
EKG VENTRICULAR RATE: 85 BPM
EOSINOPHILS ABSOLUTE: 0.1 K/CU MM
EOSINOPHILS ABSOLUTE: 0.2 K/CU MM
EOSINOPHILS RELATIVE PERCENT: 3.9 % (ref 0–3)
EOSINOPHILS RELATIVE PERCENT: 4.1 % (ref 0–3)
ESTIMATED AVERAGE GLUCOSE: 192 MG/DL
FERRITIN: 29 NG/ML (ref 15–150)
FERRITIN: 358 NG/ML (ref 15–150)
GFR AFRICAN AMERICAN: >60 ML/MIN/1.73M2
GFR NON-AFRICAN AMERICAN: 57 ML/MIN/1.73M2
GFR NON-AFRICAN AMERICAN: >60 ML/MIN/1.73M2
GFR NON-AFRICAN AMERICAN: >60 ML/MIN/1.73M2
GLUCOSE BLD-MCNC: 190 MG/DL (ref 70–99)
GLUCOSE BLD-MCNC: 192 MG/DL (ref 70–99)
GLUCOSE BLD-MCNC: 193 MG/DL (ref 70–99)
GLUCOSE BLD-MCNC: 206 MG/DL (ref 70–99)
GLUCOSE BLD-MCNC: 216 MG/DL (ref 70–99)
GLUCOSE BLD-MCNC: 252 MG/DL (ref 70–99)
GLUCOSE BLD-MCNC: 315 MG/DL (ref 70–99)
GLUCOSE BLD-MCNC: 324 MG/DL (ref 70–99)
GLUCOSE BLD-MCNC: 337 MG/DL (ref 70–99)
GLUCOSE BLD-MCNC: 367 MG/DL (ref 70–99)
GLUCOSE BLD-MCNC: 379 MG/DL (ref 70–99)
GLUCOSE BLD-MCNC: 435 MG/DL (ref 70–99)
GLUCOSE BLD-MCNC: 502 MG/DL (ref 70–99)
GLUCOSE BLD-MCNC: 541 MG/DL (ref 70–99)
HBA1C MFR BLD: 8.3 % (ref 4.2–6.3)
HCT VFR BLD CALC: 33.9 % (ref 37–47)
HCT VFR BLD CALC: 35.5 % (ref 37–47)
HCT VFR BLD CALC: 35.5 % (ref 37–47)
HDLC SERPL-MCNC: 54 MG/DL
HEMOGLOBIN: 10.4 GM/DL (ref 12.5–16)
HEMOGLOBIN: 10.5 GM/DL (ref 12.5–16)
HEMOGLOBIN: 9.9 GM/DL (ref 12.5–16)
HUMAN METAPNEUMOVIRUS PCR: NOT DETECTED
IMMATURE NEUTROPHIL %: 0.3 % (ref 0–0.43)
IMMATURE NEUTROPHIL %: 0.3 % (ref 0–0.43)
INFLUENZA A BY PCR: NOT DETECTED
INFLUENZA A H1 (2009) PCR: NOT DETECTED
INFLUENZA A H1 PANDEMIC PCR: NOT DETECTED
INFLUENZA A H3 PCR: NOT DETECTED
INFLUENZA B BY PCR: NOT DETECTED
INR BLD: 1.14 INDEX
INR BLD: 1.14 INDEX
IRON: 112 UG/DL (ref 37–145)
IRON: 65 UG/DL (ref 37–145)
LDL CHOLESTEROL DIRECT: 67 MG/DL
LV EF: 60 %
LVEF MODALITY: NORMAL
LYMPHOCYTES ABSOLUTE: 0.7 K/CU MM
LYMPHOCYTES ABSOLUTE: 0.7 K/CU MM
LYMPHOCYTES RELATIVE PERCENT: 19.9 % (ref 24–44)
LYMPHOCYTES RELATIVE PERCENT: 22 % (ref 24–44)
MCH RBC QN AUTO: 25.7 PG (ref 27–31)
MCH RBC QN AUTO: 25.7 PG (ref 27–31)
MCH RBC QN AUTO: 26.4 PG (ref 27–31)
MCHC RBC AUTO-ENTMCNC: 29.2 % (ref 32–36)
MCHC RBC AUTO-ENTMCNC: 29.3 % (ref 32–36)
MCHC RBC AUTO-ENTMCNC: 29.6 % (ref 32–36)
MCV RBC AUTO: 87.7 FL (ref 78–100)
MCV RBC AUTO: 88.1 FL (ref 78–100)
MCV RBC AUTO: 89.2 FL (ref 78–100)
MONOCYTES ABSOLUTE: 0.4 K/CU MM
MONOCYTES ABSOLUTE: 0.4 K/CU MM
MONOCYTES RELATIVE PERCENT: 12 % (ref 0–4)
MONOCYTES RELATIVE PERCENT: 9.8 % (ref 0–4)
MS ALPHA-FETOPROTEIN: 7 NG/ML (ref 0–9)
MS ALPHA-FETOPROTEIN: NORMAL NG/ML (ref 0–9)
MYCOPLASMA PNEUMONIAE PCR: NOT DETECTED
NUCLEATED RBC %: 0 %
NUCLEATED RBC %: 0 %
PARAINFLUENZA 1 PCR: NOT DETECTED
PARAINFLUENZA 2 PCR: NOT DETECTED
PARAINFLUENZA 3 PCR: NOT DETECTED
PARAINFLUENZA 4 PCR: NOT DETECTED
PCT TRANSFERRIN: 15 % (ref 10–44)
PCT TRANSFERRIN: 29 % (ref 10–44)
PDW BLD-RTO: 16.1 % (ref 11.7–14.9)
PDW BLD-RTO: 16.2 % (ref 11.7–14.9)
PDW BLD-RTO: 16.4 % (ref 11.7–14.9)
PLATELET # BLD: 101 K/CU MM (ref 140–440)
PLATELET # BLD: 102 K/CU MM (ref 140–440)
PLATELET # BLD: 89 K/CU MM (ref 140–440)
PMV BLD AUTO: 10.4 FL (ref 7.5–11.1)
PMV BLD AUTO: 10.4 FL (ref 7.5–11.1)
PMV BLD AUTO: 10.9 FL (ref 7.5–11.1)
POTASSIUM SERPL-SCNC: 4.6 MMOL/L (ref 3.5–5.1)
POTASSIUM SERPL-SCNC: 4.7 MMOL/L (ref 3.5–5.1)
POTASSIUM SERPL-SCNC: 5.1 MMOL/L (ref 3.5–5.1)
PRO-BNP: 76.04 PG/ML
PROCALCITONIN: 0.12
PROTHROMBIN TIME: 13 SECONDS (ref 11.7–14.5)
PROTHROMBIN TIME: 13 SECONDS (ref 11.7–14.5)
RBC # BLD: 3.85 M/CU MM (ref 4.2–5.4)
RBC # BLD: 3.98 M/CU MM (ref 4.2–5.4)
RBC # BLD: 4.05 M/CU MM (ref 4.2–5.4)
RHINOVIRUS ENTEROVIRUS PCR: NOT DETECTED
RSV PCR: NOT DETECTED
SEGMENTED NEUTROPHILS ABSOLUTE COUNT: 2 K/CU MM
SEGMENTED NEUTROPHILS ABSOLUTE COUNT: 2.4 K/CU MM
SEGMENTED NEUTROPHILS RELATIVE PERCENT: 60.9 % (ref 36–66)
SEGMENTED NEUTROPHILS RELATIVE PERCENT: 65.1 % (ref 36–66)
SODIUM BLD-SCNC: 136 MMOL/L (ref 135–145)
SODIUM BLD-SCNC: 137 MMOL/L (ref 135–145)
SODIUM BLD-SCNC: 139 MMOL/L (ref 135–145)
TOTAL IMMATURE NEUTOROPHIL: 0.01 K/CU MM
TOTAL IMMATURE NEUTOROPHIL: 0.01 K/CU MM
TOTAL IRON BINDING CAPACITY: 390 UG/DL (ref 250–450)
TOTAL IRON BINDING CAPACITY: 441 UG/DL (ref 250–450)
TOTAL NUCLEATED RBC: 0 K/CU MM
TOTAL NUCLEATED RBC: 0 K/CU MM
TOTAL PROTEIN: 7.3 GM/DL (ref 6.4–8.2)
TOTAL PROTEIN: 7.5 GM/DL (ref 6.4–8.2)
TRIGL SERPL-MCNC: 69 MG/DL
TROPONIN T: <0.01 NG/ML
TROPONIN T: <0.01 NG/ML
UNSATURATED IRON BINDING CAPACITY: 278 UG/DL (ref 110–370)
UNSATURATED IRON BINDING CAPACITY: 376 UG/DL (ref 110–370)
WBC # BLD: 3.3 K/CU MM (ref 4–10.5)
WBC # BLD: 3.3 K/CU MM (ref 4–10.5)
WBC # BLD: 3.7 K/CU MM (ref 4–10.5)

## 2019-01-01 PROCEDURE — 82248 BILIRUBIN DIRECT: CPT

## 2019-01-01 PROCEDURE — 6370000000 HC RX 637 (ALT 250 FOR IP): Performed by: PHYSICIAN ASSISTANT

## 2019-01-01 PROCEDURE — 82105 ALPHA-FETOPROTEIN SERUM: CPT

## 2019-01-01 PROCEDURE — 83880 ASSAY OF NATRIURETIC PEPTIDE: CPT

## 2019-01-01 PROCEDURE — 1200000000 HC SEMI PRIVATE

## 2019-01-01 PROCEDURE — G0378 HOSPITAL OBSERVATION PER HR: HCPCS

## 2019-01-01 PROCEDURE — 83036 HEMOGLOBIN GLYCOSYLATED A1C: CPT

## 2019-01-01 PROCEDURE — 6360000002 HC RX W HCPCS: Performed by: INTERNAL MEDICINE

## 2019-01-01 PROCEDURE — 85610 PROTHROMBIN TIME: CPT

## 2019-01-01 PROCEDURE — 96374 THER/PROPH/DIAG INJ IV PUSH: CPT

## 2019-01-01 PROCEDURE — 2580000003 HC RX 258: Performed by: INTERNAL MEDICINE

## 2019-01-01 PROCEDURE — 71275 CT ANGIOGRAPHY CHEST: CPT

## 2019-01-01 PROCEDURE — 94640 AIRWAY INHALATION TREATMENT: CPT

## 2019-01-01 PROCEDURE — 85025 COMPLETE CBC W/AUTO DIFF WBC: CPT

## 2019-01-01 PROCEDURE — A9500 TC99M SESTAMIBI: HCPCS | Performed by: INTERNAL MEDICINE

## 2019-01-01 PROCEDURE — 82962 GLUCOSE BLOOD TEST: CPT

## 2019-01-01 PROCEDURE — 6370000000 HC RX 637 (ALT 250 FOR IP): Performed by: INTERNAL MEDICINE

## 2019-01-01 PROCEDURE — 78452 HT MUSCLE IMAGE SPECT MULT: CPT

## 2019-01-01 PROCEDURE — 80053 COMPREHEN METABOLIC PANEL: CPT

## 2019-01-01 PROCEDURE — 36415 COLL VENOUS BLD VENIPUNCTURE: CPT

## 2019-01-01 PROCEDURE — 86850 RBC ANTIBODY SCREEN: CPT

## 2019-01-01 PROCEDURE — 84484 ASSAY OF TROPONIN QUANT: CPT

## 2019-01-01 PROCEDURE — 94660 CPAP INITIATION&MGMT: CPT

## 2019-01-01 PROCEDURE — 80048 BASIC METABOLIC PNL TOTAL CA: CPT

## 2019-01-01 PROCEDURE — 85027 COMPLETE CBC AUTOMATED: CPT

## 2019-01-01 PROCEDURE — 87581 M.PNEUMON DNA AMP PROBE: CPT

## 2019-01-01 PROCEDURE — 87633 RESP VIRUS 12-25 TARGETS: CPT

## 2019-01-01 PROCEDURE — 94761 N-INVAS EAR/PLS OXIMETRY MLT: CPT

## 2019-01-01 PROCEDURE — 99285 EMERGENCY DEPT VISIT HI MDM: CPT

## 2019-01-01 PROCEDURE — 93010 ELECTROCARDIOGRAM REPORT: CPT | Performed by: INTERNAL MEDICINE

## 2019-01-01 PROCEDURE — 83540 ASSAY OF IRON: CPT

## 2019-01-01 PROCEDURE — 83721 ASSAY OF BLOOD LIPOPROTEIN: CPT

## 2019-01-01 PROCEDURE — 6360000004 HC RX CONTRAST MEDICATION: Performed by: EMERGENCY MEDICINE

## 2019-01-01 PROCEDURE — 84145 PROCALCITONIN (PCT): CPT

## 2019-01-01 PROCEDURE — 86900 BLOOD TYPING SEROLOGIC ABO: CPT

## 2019-01-01 PROCEDURE — 83550 IRON BINDING TEST: CPT

## 2019-01-01 PROCEDURE — 93017 CV STRESS TEST TRACING ONLY: CPT

## 2019-01-01 PROCEDURE — 3430000000 HC RX DIAGNOSTIC RADIOPHARMACEUTICAL: Performed by: INTERNAL MEDICINE

## 2019-01-01 PROCEDURE — 82728 ASSAY OF FERRITIN: CPT

## 2019-01-01 PROCEDURE — 99232 SBSQ HOSP IP/OBS MODERATE 35: CPT | Performed by: INTERNAL MEDICINE

## 2019-01-01 PROCEDURE — 96376 TX/PRO/DX INJ SAME DRUG ADON: CPT

## 2019-01-01 PROCEDURE — 71046 X-RAY EXAM CHEST 2 VIEWS: CPT

## 2019-01-01 PROCEDURE — 99244 OFF/OP CNSLTJ NEW/EST MOD 40: CPT | Performed by: INTERNAL MEDICINE

## 2019-01-01 PROCEDURE — 96372 THER/PROPH/DIAG INJ SC/IM: CPT

## 2019-01-01 PROCEDURE — 80061 LIPID PANEL: CPT

## 2019-01-01 PROCEDURE — 87798 DETECT AGENT NOS DNA AMP: CPT

## 2019-01-01 PROCEDURE — 87486 CHLMYD PNEUM DNA AMP PROBE: CPT

## 2019-01-01 PROCEDURE — 93005 ELECTROCARDIOGRAM TRACING: CPT | Performed by: NURSE PRACTITIONER

## 2019-01-01 PROCEDURE — 2700000000 HC OXYGEN THERAPY PER DAY

## 2019-01-01 PROCEDURE — APPSS30 APP SPLIT SHARED TIME 16-30 MINUTES: Performed by: NURSE PRACTITIONER

## 2019-01-01 PROCEDURE — 86901 BLOOD TYPING SEROLOGIC RH(D): CPT

## 2019-01-01 RX ORDER — GABAPENTIN 300 MG/1
300 CAPSULE ORAL 3 TIMES DAILY PRN
Refills: 3 | COMMUNITY
Start: 2019-01-01

## 2019-01-01 RX ORDER — FERROUS SULFATE 325(65) MG
325 TABLET ORAL EVERY OTHER DAY
Status: DISCONTINUED | OUTPATIENT
Start: 2019-01-01 | End: 2019-01-01 | Stop reason: HOSPADM

## 2019-01-01 RX ORDER — FERROUS SULFATE 325(65) MG
325 TABLET ORAL EVERY OTHER DAY
Qty: 60 TABLET | Refills: 0
Start: 2019-01-01

## 2019-01-01 RX ORDER — DOXYCYCLINE HYCLATE 100 MG
100 TABLET ORAL EVERY 12 HOURS SCHEDULED
Qty: 8 TABLET | Refills: 0 | Status: SHIPPED | OUTPATIENT
Start: 2019-01-01 | End: 2019-01-01

## 2019-01-01 RX ORDER — INSULIN GLARGINE 100 [IU]/ML
50 INJECTION, SOLUTION SUBCUTANEOUS NIGHTLY
Qty: 1 VIAL | Refills: 3 | Status: ON HOLD | OUTPATIENT
Start: 2019-01-01 | End: 2020-01-01 | Stop reason: HOSPADM

## 2019-01-01 RX ORDER — HYDROXYZINE 50 MG/1
100 TABLET, FILM COATED ORAL NIGHTLY
Status: DISCONTINUED | OUTPATIENT
Start: 2019-01-01 | End: 2019-01-01 | Stop reason: HOSPADM

## 2019-01-01 RX ORDER — METHYLPREDNISOLONE SODIUM SUCCINATE 40 MG/ML
40 INJECTION, POWDER, LYOPHILIZED, FOR SOLUTION INTRAMUSCULAR; INTRAVENOUS EVERY 8 HOURS
Status: DISCONTINUED | OUTPATIENT
Start: 2019-01-01 | End: 2019-01-01

## 2019-01-01 RX ORDER — NICOTINE POLACRILEX 4 MG
15 LOZENGE BUCCAL PRN
Status: DISCONTINUED | OUTPATIENT
Start: 2019-01-01 | End: 2019-01-01 | Stop reason: HOSPADM

## 2019-01-01 RX ORDER — CLONIDINE HYDROCHLORIDE 0.2 MG/1
0.2 TABLET ORAL 3 TIMES DAILY
Status: DISCONTINUED | OUTPATIENT
Start: 2019-01-01 | End: 2019-01-01 | Stop reason: HOSPADM

## 2019-01-01 RX ORDER — EXEMESTANE 25 MG/1
25 TABLET ORAL DAILY
Status: DISCONTINUED | OUTPATIENT
Start: 2019-01-01 | End: 2019-01-01 | Stop reason: HOSPADM

## 2019-01-01 RX ORDER — SODIUM CHLORIDE 0.9 % (FLUSH) 0.9 %
10 SYRINGE (ML) INJECTION PRN
Status: DISCONTINUED | OUTPATIENT
Start: 2019-01-01 | End: 2019-01-01 | Stop reason: HOSPADM

## 2019-01-01 RX ORDER — PREDNISONE 20 MG/1
40 TABLET ORAL DAILY
Qty: 4 TABLET | Refills: 0 | Status: SHIPPED | OUTPATIENT
Start: 2019-01-01 | End: 2019-01-01

## 2019-01-01 RX ORDER — ZOLPIDEM TARTRATE 5 MG/1
10 TABLET ORAL NIGHTLY
Status: DISCONTINUED | OUTPATIENT
Start: 2019-01-01 | End: 2019-01-01 | Stop reason: HOSPADM

## 2019-01-01 RX ORDER — SPIRONOLACTONE 50 MG/1
100 TABLET, FILM COATED ORAL 2 TIMES DAILY
Status: DISCONTINUED | OUTPATIENT
Start: 2019-01-01 | End: 2019-01-01 | Stop reason: HOSPADM

## 2019-01-01 RX ORDER — DEXTROSE MONOHYDRATE 25 G/50ML
12.5 INJECTION, SOLUTION INTRAVENOUS PRN
Status: DISCONTINUED | OUTPATIENT
Start: 2019-01-01 | End: 2019-01-01 | Stop reason: HOSPADM

## 2019-01-01 RX ORDER — INSULIN GLARGINE 100 [IU]/ML
50 INJECTION, SOLUTION SUBCUTANEOUS NIGHTLY
Status: DISCONTINUED | OUTPATIENT
Start: 2019-01-01 | End: 2019-01-01 | Stop reason: HOSPADM

## 2019-01-01 RX ORDER — TORSEMIDE 20 MG/1
20 TABLET ORAL DAILY
Status: DISCONTINUED | OUTPATIENT
Start: 2019-01-01 | End: 2019-01-01 | Stop reason: HOSPADM

## 2019-01-01 RX ORDER — SODIUM CHLORIDE 0.9 % (FLUSH) 0.9 %
10 SYRINGE (ML) INJECTION PRN
Status: DISCONTINUED | OUTPATIENT
Start: 2019-01-01 | End: 2019-01-01

## 2019-01-01 RX ORDER — INSULIN GLARGINE 100 [IU]/ML
0.25 INJECTION, SOLUTION SUBCUTANEOUS NIGHTLY
Status: DISCONTINUED | OUTPATIENT
Start: 2019-01-01 | End: 2019-01-01

## 2019-01-01 RX ORDER — PREDNISONE 20 MG/1
40 TABLET ORAL DAILY
Status: DISCONTINUED | OUTPATIENT
Start: 2019-01-01 | End: 2019-01-01

## 2019-01-01 RX ORDER — IPRATROPIUM BROMIDE AND ALBUTEROL SULFATE 2.5; .5 MG/3ML; MG/3ML
1 SOLUTION RESPIRATORY (INHALATION)
Status: DISCONTINUED | OUTPATIENT
Start: 2019-01-01 | End: 2019-01-01 | Stop reason: HOSPADM

## 2019-01-01 RX ORDER — DEXTROSE MONOHYDRATE 50 MG/ML
100 INJECTION, SOLUTION INTRAVENOUS PRN
Status: DISCONTINUED | OUTPATIENT
Start: 2019-01-01 | End: 2019-01-01 | Stop reason: HOSPADM

## 2019-01-01 RX ORDER — ONDANSETRON 2 MG/ML
4 INJECTION INTRAMUSCULAR; INTRAVENOUS EVERY 6 HOURS PRN
Status: DISCONTINUED | OUTPATIENT
Start: 2019-01-01 | End: 2019-01-01 | Stop reason: HOSPADM

## 2019-01-01 RX ORDER — ASPIRIN 81 MG/1
81 TABLET, CHEWABLE ORAL DAILY
Status: DISCONTINUED | OUTPATIENT
Start: 2019-01-01 | End: 2019-01-01 | Stop reason: HOSPADM

## 2019-01-01 RX ORDER — SODIUM CHLORIDE 0.9 % (FLUSH) 0.9 %
10 SYRINGE (ML) INJECTION EVERY 12 HOURS SCHEDULED
Status: DISCONTINUED | OUTPATIENT
Start: 2019-01-01 | End: 2019-01-01 | Stop reason: HOSPADM

## 2019-01-01 RX ORDER — ASPIRIN 81 MG/1
324 TABLET, CHEWABLE ORAL ONCE
Status: COMPLETED | OUTPATIENT
Start: 2019-01-01 | End: 2019-01-01

## 2019-01-01 RX ORDER — SIMVASTATIN 20 MG
20 TABLET ORAL NIGHTLY
Status: DISCONTINUED | OUTPATIENT
Start: 2019-01-01 | End: 2019-01-01 | Stop reason: HOSPADM

## 2019-01-01 RX ORDER — DOXYCYCLINE HYCLATE 100 MG
100 TABLET ORAL EVERY 12 HOURS SCHEDULED
Status: DISCONTINUED | OUTPATIENT
Start: 2019-01-01 | End: 2019-01-01 | Stop reason: HOSPADM

## 2019-01-01 RX ORDER — GABAPENTIN 300 MG/1
300 CAPSULE ORAL 3 TIMES DAILY PRN
Status: DISCONTINUED | OUTPATIENT
Start: 2019-01-01 | End: 2019-01-01 | Stop reason: HOSPADM

## 2019-01-01 RX ORDER — GLIMEPIRIDE 4 MG/1
4 TABLET ORAL 2 TIMES DAILY
Status: DISCONTINUED | OUTPATIENT
Start: 2019-01-01 | End: 2019-01-01

## 2019-01-01 RX ORDER — ISOSORBIDE MONONITRATE 30 MG/1
30 TABLET, EXTENDED RELEASE ORAL NIGHTLY
Status: DISCONTINUED | OUTPATIENT
Start: 2019-01-01 | End: 2019-01-01 | Stop reason: HOSPADM

## 2019-01-01 RX ORDER — SODIUM CHLORIDE 0.9 % (FLUSH) 0.9 %
10 SYRINGE (ML) INJECTION EVERY 12 HOURS SCHEDULED
Status: DISCONTINUED | OUTPATIENT
Start: 2019-01-01 | End: 2019-01-01

## 2019-01-01 RX ORDER — LEVOTHYROXINE SODIUM 0.1 MG/1
200 TABLET ORAL DAILY
Status: DISCONTINUED | OUTPATIENT
Start: 2019-01-01 | End: 2019-01-01 | Stop reason: HOSPADM

## 2019-01-01 RX ORDER — METHYLPREDNISOLONE SODIUM SUCCINATE 40 MG/ML
40 INJECTION, POWDER, LYOPHILIZED, FOR SOLUTION INTRAMUSCULAR; INTRAVENOUS EVERY 12 HOURS
Status: DISCONTINUED | OUTPATIENT
Start: 2019-01-01 | End: 2019-01-01 | Stop reason: HOSPADM

## 2019-01-01 RX ADMIN — THEOPHYLLINE ANHYDROUS 100 MG: 100 CAPSULE, EXTENDED RELEASE ORAL at 21:16

## 2019-01-01 RX ADMIN — SPIRONOLACTONE 100 MG: 50 TABLET ORAL at 17:01

## 2019-01-01 RX ADMIN — SIMVASTATIN 20 MG: 20 TABLET, FILM COATED ORAL at 22:21

## 2019-01-01 RX ADMIN — CLONIDINE HYDROCHLORIDE 0.2 MG: 0.2 TABLET ORAL at 22:20

## 2019-01-01 RX ADMIN — IPRATROPIUM BROMIDE AND ALBUTEROL SULFATE 1 AMPULE: .5; 3 SOLUTION RESPIRATORY (INHALATION) at 11:15

## 2019-01-01 RX ADMIN — Medication 10 ML: at 08:19

## 2019-01-01 RX ADMIN — Medication 10 ML: at 22:42

## 2019-01-01 RX ADMIN — HYDROXYZINE HYDROCHLORIDE 100 MG: 50 TABLET, FILM COATED ORAL at 22:39

## 2019-01-01 RX ADMIN — REGADENOSON 0.4 MG: 0.08 INJECTION, SOLUTION INTRAVENOUS at 10:15

## 2019-01-01 RX ADMIN — ASPIRIN 81 MG 324 MG: 81 TABLET ORAL at 18:25

## 2019-01-01 RX ADMIN — IPRATROPIUM BROMIDE AND ALBUTEROL SULFATE 1 AMPULE: .5; 3 SOLUTION RESPIRATORY (INHALATION) at 15:56

## 2019-01-01 RX ADMIN — FERROUS SULFATE TAB 325 MG (65 MG ELEMENTAL FE) 325 MG: 325 (65 FE) TAB at 08:10

## 2019-01-01 RX ADMIN — SIMVASTATIN 20 MG: 20 TABLET, FILM COATED ORAL at 21:16

## 2019-01-01 RX ADMIN — INSULIN LISPRO 15 UNITS: 100 INJECTION, SOLUTION INTRAVENOUS; SUBCUTANEOUS at 11:44

## 2019-01-01 RX ADMIN — IPRATROPIUM BROMIDE AND ALBUTEROL SULFATE 1 AMPULE: .5; 3 SOLUTION RESPIRATORY (INHALATION) at 15:22

## 2019-01-01 RX ADMIN — LEVOTHYROXINE SODIUM 200 MCG: 100 TABLET ORAL at 06:36

## 2019-01-01 RX ADMIN — TORSEMIDE 20 MG: 20 TABLET ORAL at 12:32

## 2019-01-01 RX ADMIN — ISOSORBIDE MONONITRATE 30 MG: 30 TABLET, EXTENDED RELEASE ORAL at 21:16

## 2019-01-01 RX ADMIN — ZOLPIDEM TARTRATE 10 MG: 5 TABLET ORAL at 23:02

## 2019-01-01 RX ADMIN — Medication 10 ML: at 08:05

## 2019-01-01 RX ADMIN — EXEMESTANE 25 MG: 25 TABLET ORAL at 08:06

## 2019-01-01 RX ADMIN — IPRATROPIUM BROMIDE AND ALBUTEROL SULFATE 1 AMPULE: .5; 3 SOLUTION RESPIRATORY (INHALATION) at 07:38

## 2019-01-01 RX ADMIN — IPRATROPIUM BROMIDE AND ALBUTEROL SULFATE 1 AMPULE: .5; 3 SOLUTION RESPIRATORY (INHALATION) at 08:25

## 2019-01-01 RX ADMIN — LEVOTHYROXINE SODIUM 200 MCG: 100 TABLET ORAL at 08:10

## 2019-01-01 RX ADMIN — ASPIRIN 81 MG 81 MG: 81 TABLET ORAL at 08:10

## 2019-01-01 RX ADMIN — INSULIN GLARGINE 36 UNITS: 100 INJECTION, SOLUTION SUBCUTANEOUS at 22:40

## 2019-01-01 RX ADMIN — METHYLPREDNISOLONE SODIUM SUCCINATE 40 MG: 40 INJECTION, POWDER, FOR SOLUTION INTRAMUSCULAR; INTRAVENOUS at 12:03

## 2019-01-01 RX ADMIN — HYDROXYZINE HYDROCHLORIDE 100 MG: 50 TABLET, FILM COATED ORAL at 21:17

## 2019-01-01 RX ADMIN — Medication 30 MILLICURIE: at 10:25

## 2019-01-01 RX ADMIN — CLONIDINE HYDROCHLORIDE 0.2 MG: 0.2 TABLET ORAL at 13:22

## 2019-01-01 RX ADMIN — GLIMEPIRIDE 4 MG: 4 TABLET ORAL at 08:10

## 2019-01-01 RX ADMIN — SPIRONOLACTONE 100 MG: 50 TABLET ORAL at 08:10

## 2019-01-01 RX ADMIN — DOXYCYCLINE HYCLATE 100 MG: 100 TABLET, COATED ORAL at 21:16

## 2019-01-01 RX ADMIN — GLIMEPIRIDE 4 MG: 4 TABLET ORAL at 22:41

## 2019-01-01 RX ADMIN — SPIRONOLACTONE 100 MG: 50 TABLET ORAL at 22:41

## 2019-01-01 RX ADMIN — PREDNISONE 40 MG: 20 TABLET ORAL at 22:41

## 2019-01-01 RX ADMIN — PREDNISONE 40 MG: 20 TABLET ORAL at 08:10

## 2019-01-01 RX ADMIN — CLONIDINE HYDROCHLORIDE 0.2 MG: 0.2 TABLET ORAL at 15:46

## 2019-01-01 RX ADMIN — DOXYCYCLINE HYCLATE 100 MG: 100 TABLET, COATED ORAL at 12:03

## 2019-01-01 RX ADMIN — ZOLPIDEM TARTRATE 10 MG: 5 TABLET ORAL at 22:21

## 2019-01-01 RX ADMIN — TORSEMIDE 20 MG: 20 TABLET ORAL at 08:10

## 2019-01-01 RX ADMIN — INSULIN LISPRO 18 UNITS: 100 INJECTION, SOLUTION INTRAVENOUS; SUBCUTANEOUS at 16:50

## 2019-01-01 RX ADMIN — IPRATROPIUM BROMIDE AND ALBUTEROL SULFATE 1 AMPULE: .5; 3 SOLUTION RESPIRATORY (INHALATION) at 20:32

## 2019-01-01 RX ADMIN — CLONIDINE HYDROCHLORIDE 0.2 MG: 0.2 TABLET ORAL at 21:16

## 2019-01-01 RX ADMIN — METHYLPREDNISOLONE SODIUM SUCCINATE 40 MG: 40 INJECTION, POWDER, FOR SOLUTION INTRAMUSCULAR; INTRAVENOUS at 13:22

## 2019-01-01 RX ADMIN — IPRATROPIUM BROMIDE AND ALBUTEROL SULFATE 1 AMPULE: .5; 3 SOLUTION RESPIRATORY (INHALATION) at 23:20

## 2019-01-01 RX ADMIN — IOPAMIDOL 75 ML: 755 INJECTION, SOLUTION INTRAVENOUS at 18:46

## 2019-01-01 RX ADMIN — CLONIDINE HYDROCHLORIDE 0.2 MG: 0.2 TABLET ORAL at 08:01

## 2019-01-01 RX ADMIN — THEOPHYLLINE ANHYDROUS 100 MG: 100 CAPSULE, EXTENDED RELEASE ORAL at 13:21

## 2019-01-01 RX ADMIN — Medication 10 MILLICURIE: at 08:00

## 2019-01-01 RX ADMIN — CLONIDINE HYDROCHLORIDE 0.2 MG: 0.2 TABLET ORAL at 08:10

## 2019-01-01 RX ADMIN — ASPIRIN 81 MG 81 MG: 81 TABLET ORAL at 08:01

## 2019-01-01 RX ADMIN — IPRATROPIUM BROMIDE AND ALBUTEROL SULFATE 1 AMPULE: .5; 3 SOLUTION RESPIRATORY (INHALATION) at 12:05

## 2019-01-01 RX ADMIN — ENOXAPARIN SODIUM 40 MG: 40 INJECTION SUBCUTANEOUS at 08:10

## 2019-01-01 RX ADMIN — ISOSORBIDE MONONITRATE 30 MG: 30 TABLET, EXTENDED RELEASE ORAL at 22:20

## 2019-01-01 RX ADMIN — INSULIN GLARGINE 50 UNITS: 100 INJECTION, SOLUTION SUBCUTANEOUS at 21:19

## 2019-01-01 RX ADMIN — SPIRONOLACTONE 100 MG: 50 TABLET ORAL at 08:02

## 2019-01-01 RX ADMIN — INSULIN LISPRO 12 UNITS: 100 INJECTION, SOLUTION INTRAVENOUS; SUBCUTANEOUS at 08:11

## 2019-01-01 RX ADMIN — SPIRONOLACTONE 100 MG: 50 TABLET ORAL at 16:47

## 2019-01-01 ASSESSMENT — PAIN DESCRIPTION - DESCRIPTORS: DESCRIPTORS: ACHING

## 2019-01-01 ASSESSMENT — PAIN SCALES - GENERAL
PAINLEVEL_OUTOF10: 0
PAINLEVEL_OUTOF10: 0
PAINLEVEL_OUTOF10: 8

## 2019-01-01 ASSESSMENT — PAIN DESCRIPTION - PAIN TYPE: TYPE: ACUTE PAIN

## 2019-01-01 ASSESSMENT — PAIN DESCRIPTION - FREQUENCY: FREQUENCY: CONTINUOUS

## 2019-03-14 ENCOUNTER — HOSPITAL ENCOUNTER (OUTPATIENT)
Age: 59
Setting detail: SPECIMEN
Discharge: HOME | End: 2019-03-14
Payer: COMMERCIAL

## 2019-03-14 LAB
FERRITIN: 52 NG/ML (ref 15–150)
IRON: 66 UG/DL (ref 37–145)
PCT TRANSFERRIN: 17 % (ref 10–44)
TOTAL IRON BINDING CAPACITY: 390 UG/DL (ref 250–450)
UNSATURATED IRON BINDING CAPACITY: 324 UG/DL (ref 110–370)

## 2019-03-14 PROCEDURE — 83550 IRON BINDING TEST: CPT

## 2019-03-14 PROCEDURE — 82728 ASSAY OF FERRITIN: CPT

## 2019-03-14 PROCEDURE — 83540 ASSAY OF IRON: CPT

## 2019-10-26 PROBLEM — R07.9 CHEST PAIN: Status: ACTIVE | Noted: 2019-01-01

## 2019-10-28 PROBLEM — R06.02 SHORTNESS OF BREATH: Status: ACTIVE | Noted: 2019-01-01

## 2020-01-01 ENCOUNTER — APPOINTMENT (OUTPATIENT)
Dept: ULTRASOUND IMAGING | Age: 60
DRG: 421 | End: 2020-01-01
Payer: COMMERCIAL

## 2020-01-01 ENCOUNTER — APPOINTMENT (OUTPATIENT)
Dept: GENERAL RADIOLOGY | Age: 60
DRG: 871 | End: 2020-01-01
Payer: COMMERCIAL

## 2020-01-01 ENCOUNTER — APPOINTMENT (OUTPATIENT)
Dept: NUCLEAR MEDICINE | Age: 60
DRG: 421 | End: 2020-01-01
Payer: COMMERCIAL

## 2020-01-01 ENCOUNTER — HOSPITAL ENCOUNTER (INPATIENT)
Age: 60
LOS: 4 days | Discharge: ANOTHER HOSPITAL | DRG: 871 | End: 2020-04-06
Attending: HOSPITALIST | Admitting: HOSPITALIST
Payer: COMMERCIAL

## 2020-01-01 ENCOUNTER — APPOINTMENT (OUTPATIENT)
Dept: GENERAL RADIOLOGY | Age: 60
DRG: 421 | End: 2020-01-01
Payer: COMMERCIAL

## 2020-01-01 ENCOUNTER — APPOINTMENT (OUTPATIENT)
Dept: CT IMAGING | Age: 60
DRG: 871 | End: 2020-01-01
Payer: COMMERCIAL

## 2020-01-01 ENCOUNTER — APPOINTMENT (OUTPATIENT)
Dept: GENERAL RADIOLOGY | Age: 60
DRG: 638 | End: 2020-01-01
Payer: COMMERCIAL

## 2020-01-01 ENCOUNTER — HOSPITAL ENCOUNTER (INPATIENT)
Age: 60
LOS: 5 days | Discharge: ANOTHER HOSPITAL | DRG: 871 | End: 2020-04-28
Attending: EMERGENCY MEDICINE | Admitting: HOSPITALIST
Payer: COMMERCIAL

## 2020-01-01 ENCOUNTER — APPOINTMENT (OUTPATIENT)
Dept: ULTRASOUND IMAGING | Age: 60
DRG: 871 | End: 2020-01-01
Payer: COMMERCIAL

## 2020-01-01 ENCOUNTER — ANESTHESIA (OUTPATIENT)
Dept: OPERATING ROOM | Age: 60
DRG: 421 | End: 2020-01-01
Payer: COMMERCIAL

## 2020-01-01 ENCOUNTER — HOSPITAL ENCOUNTER (OUTPATIENT)
Age: 60
Setting detail: SPECIMEN
Discharge: HOME | End: 2020-04-20
Payer: COMMERCIAL

## 2020-01-01 ENCOUNTER — APPOINTMENT (OUTPATIENT)
Dept: CT IMAGING | Age: 60
DRG: 421 | End: 2020-01-01
Payer: COMMERCIAL

## 2020-01-01 ENCOUNTER — HOSPITAL ENCOUNTER (INPATIENT)
Age: 60
LOS: 5 days | Discharge: ANOTHER HOSPITAL | DRG: 421 | End: 2020-03-05
Attending: EMERGENCY MEDICINE | Admitting: HOSPITALIST
Payer: COMMERCIAL

## 2020-01-01 ENCOUNTER — ANESTHESIA EVENT (OUTPATIENT)
Dept: OPERATING ROOM | Age: 60
DRG: 421 | End: 2020-01-01
Payer: COMMERCIAL

## 2020-01-01 ENCOUNTER — HOSPITAL ENCOUNTER (INPATIENT)
Age: 60
LOS: 1 days | DRG: 871 | End: 2020-05-04
Attending: INTERNAL MEDICINE | Admitting: INTERNAL MEDICINE
Payer: COMMERCIAL

## 2020-01-01 ENCOUNTER — HOSPITAL ENCOUNTER (INPATIENT)
Age: 60
LOS: 2 days | Discharge: HOME | DRG: 638 | End: 2020-03-17
Attending: EMERGENCY MEDICINE | Admitting: INTERNAL MEDICINE
Payer: COMMERCIAL

## 2020-01-01 VITALS
SYSTOLIC BLOOD PRESSURE: 135 MMHG | RESPIRATION RATE: 20 BRPM | HEART RATE: 106 BPM | BODY MASS INDEX: 48.28 KG/M2 | DIASTOLIC BLOOD PRESSURE: 47 MMHG | TEMPERATURE: 96.8 F | WEIGHT: 282.8 LBS | HEIGHT: 64 IN | OXYGEN SATURATION: 95 %

## 2020-01-01 VITALS
HEIGHT: 64 IN | OXYGEN SATURATION: 95 % | BODY MASS INDEX: 48.03 KG/M2 | SYSTOLIC BLOOD PRESSURE: 117 MMHG | TEMPERATURE: 97.6 F | DIASTOLIC BLOOD PRESSURE: 50 MMHG | RESPIRATION RATE: 26 BRPM | HEART RATE: 77 BPM | WEIGHT: 281.3 LBS

## 2020-01-01 VITALS
TEMPERATURE: 97.5 F | SYSTOLIC BLOOD PRESSURE: 144 MMHG | RESPIRATION RATE: 23 BRPM | HEART RATE: 95 BPM | BODY MASS INDEX: 51.91 KG/M2 | WEIGHT: 293 LBS | OXYGEN SATURATION: 93 % | DIASTOLIC BLOOD PRESSURE: 56 MMHG | HEIGHT: 63 IN

## 2020-01-01 VITALS
DIASTOLIC BLOOD PRESSURE: 50 MMHG | TEMPERATURE: 98.5 F | BODY MASS INDEX: 51.91 KG/M2 | HEART RATE: 89 BPM | RESPIRATION RATE: 32 BRPM | WEIGHT: 293 LBS | SYSTOLIC BLOOD PRESSURE: 134 MMHG | HEIGHT: 63 IN | OXYGEN SATURATION: 99 %

## 2020-01-01 VITALS
HEART RATE: 63 BPM | WEIGHT: 293 LBS | DIASTOLIC BLOOD PRESSURE: 43 MMHG | BODY MASS INDEX: 50.02 KG/M2 | HEIGHT: 64 IN | SYSTOLIC BLOOD PRESSURE: 82 MMHG | TEMPERATURE: 94.8 F | RESPIRATION RATE: 17 BRPM | OXYGEN SATURATION: 98 %

## 2020-01-01 VITALS
DIASTOLIC BLOOD PRESSURE: 62 MMHG | SYSTOLIC BLOOD PRESSURE: 151 MMHG | TEMPERATURE: 98.6 F | OXYGEN SATURATION: 96 % | RESPIRATION RATE: 24 BRPM

## 2020-01-01 LAB
ABO/RH: NORMAL
ADENOVIRUS DETECTION BY PCR: NOT DETECTED
ALBUMIN SERPL-MCNC: 2 GM/DL (ref 3.4–5)
ALBUMIN SERPL-MCNC: 2.1 GM/DL (ref 3.4–5)
ALBUMIN SERPL-MCNC: 2.2 GM/DL (ref 3.4–5)
ALBUMIN SERPL-MCNC: 2.2 GM/DL (ref 3.4–5)
ALBUMIN SERPL-MCNC: 2.3 GM/DL (ref 3.4–5)
ALBUMIN SERPL-MCNC: 2.4 GM/DL (ref 3.4–5)
ALBUMIN SERPL-MCNC: 2.4 GM/DL (ref 3.4–5)
ALBUMIN SERPL-MCNC: 2.5 GM/DL (ref 3.4–5)
ALBUMIN SERPL-MCNC: 2.7 GM/DL (ref 3.4–5)
ALBUMIN SERPL-MCNC: 2.8 GM/DL (ref 3.4–5)
ALP BLD-CCNC: 106 IU/L (ref 40–128)
ALP BLD-CCNC: 115 IU/L (ref 40–128)
ALP BLD-CCNC: 117 IU/L (ref 40–128)
ALP BLD-CCNC: 120 IU/L (ref 40–128)
ALP BLD-CCNC: 133 IU/L (ref 40–128)
ALP BLD-CCNC: 133 IU/L (ref 40–128)
ALP BLD-CCNC: 133 IU/L (ref 40–129)
ALP BLD-CCNC: 141 IU/L (ref 40–128)
ALP BLD-CCNC: 142 IU/L (ref 40–128)
ALP BLD-CCNC: 143 IU/L (ref 40–128)
ALP BLD-CCNC: 144 IU/L (ref 40–128)
ALP BLD-CCNC: 145 IU/L (ref 40–128)
ALP BLD-CCNC: 148 IU/L (ref 40–128)
ALP BLD-CCNC: 150 IU/L (ref 40–128)
ALP BLD-CCNC: 156 IU/L (ref 40–128)
ALP BLD-CCNC: 168 IU/L (ref 40–129)
ALP BLD-CCNC: 169 IU/L (ref 40–128)
ALP BLD-CCNC: 175 IU/L (ref 40–129)
ALT SERPL-CCNC: 12 U/L (ref 10–40)
ALT SERPL-CCNC: 15 U/L (ref 10–40)
ALT SERPL-CCNC: 29 U/L (ref 10–40)
ALT SERPL-CCNC: 30 U/L (ref 10–40)
ALT SERPL-CCNC: 33 U/L (ref 10–40)
ALT SERPL-CCNC: 33 U/L (ref 10–40)
ALT SERPL-CCNC: 36 U/L (ref 10–40)
ALT SERPL-CCNC: 36 U/L (ref 10–40)
ALT SERPL-CCNC: 40 U/L (ref 10–40)
ALT SERPL-CCNC: 41 U/L (ref 10–40)
ALT SERPL-CCNC: 46 U/L (ref 10–40)
ALT SERPL-CCNC: 61 U/L (ref 10–40)
ALT SERPL-CCNC: 61 U/L (ref 10–40)
ALT SERPL-CCNC: 71 U/L (ref 10–40)
ALT SERPL-CCNC: 74 U/L (ref 10–40)
ALT SERPL-CCNC: 75 U/L (ref 10–40)
ALT SERPL-CCNC: 77 U/L (ref 10–40)
ALT SERPL-CCNC: 93 U/L (ref 10–40)
AMMONIA: 34 UMOL/L (ref 11–51)
AMMONIA: 36 UMOL/L (ref 11–51)
AMMONIA: 36 UMOL/L (ref 11–51)
AMYLASE: 41 U/L (ref 25–115)
AMYLASE: 48 U/L (ref 25–115)
ANION GAP SERPL CALCULATED.3IONS-SCNC: 10 MMOL/L (ref 4–16)
ANION GAP SERPL CALCULATED.3IONS-SCNC: 11 MMOL/L (ref 4–16)
ANION GAP SERPL CALCULATED.3IONS-SCNC: 12 MMOL/L (ref 4–16)
ANION GAP SERPL CALCULATED.3IONS-SCNC: 13 MMOL/L (ref 4–16)
ANION GAP SERPL CALCULATED.3IONS-SCNC: 13 MMOL/L (ref 4–16)
ANION GAP SERPL CALCULATED.3IONS-SCNC: 14 MMOL/L (ref 4–16)
ANION GAP SERPL CALCULATED.3IONS-SCNC: 14 MMOL/L (ref 4–16)
ANION GAP SERPL CALCULATED.3IONS-SCNC: 19 MMOL/L (ref 4–16)
ANION GAP SERPL CALCULATED.3IONS-SCNC: 9 MMOL/L (ref 4–16)
ANISOCYTOSIS: ABNORMAL
ANTIBODY SCREEN: NEGATIVE
APTT: 40.7 SECONDS (ref 25.1–37.1)
APTT: 44.8 SECONDS (ref 25.1–37.1)
APTT: 45.6 SECONDS (ref 25.1–37.1)
APTT: 54.6 SECONDS (ref 25.1–37.1)
APTT: 76.3 SECONDS (ref 25.1–37.1)
AST SERPL-CCNC: 107 IU/L (ref 15–37)
AST SERPL-CCNC: 109 IU/L (ref 15–37)
AST SERPL-CCNC: 146 IU/L (ref 15–37)
AST SERPL-CCNC: 36 IU/L (ref 15–37)
AST SERPL-CCNC: 45 IU/L (ref 15–37)
AST SERPL-CCNC: 47 IU/L (ref 15–37)
AST SERPL-CCNC: 48 IU/L (ref 15–37)
AST SERPL-CCNC: 55 IU/L (ref 15–37)
AST SERPL-CCNC: 61 IU/L (ref 15–37)
AST SERPL-CCNC: 61 IU/L (ref 15–37)
AST SERPL-CCNC: 62 IU/L (ref 15–37)
AST SERPL-CCNC: 66 IU/L (ref 15–37)
AST SERPL-CCNC: 79 IU/L (ref 15–37)
AST SERPL-CCNC: 83 IU/L (ref 15–37)
AST SERPL-CCNC: 83 IU/L (ref 15–37)
AST SERPL-CCNC: 91 IU/L (ref 15–37)
AST SERPL-CCNC: 95 IU/L (ref 15–37)
AST SERPL-CCNC: 95 IU/L (ref 15–37)
BACTERIA: ABNORMAL /HPF
BACTERIA: NEGATIVE /HPF
BACTERIA: NEGATIVE /HPF
BANDED NEUTROPHILS ABSOLUTE COUNT: 0.23 K/CU MM
BANDED NEUTROPHILS ABSOLUTE COUNT: 0.41 K/CU MM
BANDED NEUTROPHILS ABSOLUTE COUNT: 0.56 K/CU MM
BANDED NEUTROPHILS ABSOLUTE COUNT: 1.09 K/CU MM
BANDED NEUTROPHILS ABSOLUTE COUNT: 1.42 K/CU MM
BANDED NEUTROPHILS ABSOLUTE COUNT: 1.49 K/CU MM
BANDED NEUTROPHILS ABSOLUTE COUNT: 1.53 K/CU MM
BANDED NEUTROPHILS ABSOLUTE COUNT: 2.18 K/CU MM
BANDED NEUTROPHILS ABSOLUTE COUNT: 2.77 K/CU MM
BANDED NEUTROPHILS RELATIVE PERCENT: 11 % (ref 5–11)
BANDED NEUTROPHILS RELATIVE PERCENT: 12 % (ref 5–11)
BANDED NEUTROPHILS RELATIVE PERCENT: 14 % (ref 5–11)
BANDED NEUTROPHILS RELATIVE PERCENT: 2 % (ref 5–11)
BANDED NEUTROPHILS RELATIVE PERCENT: 2 % (ref 5–11)
BANDED NEUTROPHILS RELATIVE PERCENT: 3 % (ref 5–11)
BANDED NEUTROPHILS RELATIVE PERCENT: 5 % (ref 5–11)
BANDED NEUTROPHILS RELATIVE PERCENT: 8 % (ref 5–11)
BANDED NEUTROPHILS RELATIVE PERCENT: 9 % (ref 5–11)
BASE EXCESS: 3 (ref 0–2.4)
BASE EXCESS: 6 (ref 0–2.4)
BASOPHILIC STIPPLING: PRESENT
BASOPHILS ABSOLUTE: 0 K/CU MM
BASOPHILS ABSOLUTE: 0.1 K/CU MM
BASOPHILS RELATIVE PERCENT: 0.1 % (ref 0–1)
BASOPHILS RELATIVE PERCENT: 0.2 % (ref 0–1)
BASOPHILS RELATIVE PERCENT: 0.3 % (ref 0–1)
BASOPHILS RELATIVE PERCENT: 0.4 % (ref 0–1)
BASOPHILS RELATIVE PERCENT: 0.4 % (ref 0–1)
BASOPHILS RELATIVE PERCENT: 0.6 % (ref 0–1)
BILIRUB SERPL-MCNC: 1.5 MG/DL (ref 0–1)
BILIRUB SERPL-MCNC: 1.6 MG/DL (ref 0–1)
BILIRUB SERPL-MCNC: 1.6 MG/DL (ref 0–1)
BILIRUB SERPL-MCNC: 1.8 MG/DL (ref 0–1)
BILIRUB SERPL-MCNC: 1.8 MG/DL (ref 0–1)
BILIRUB SERPL-MCNC: 2 MG/DL (ref 0–1)
BILIRUB SERPL-MCNC: 2.1 MG/DL (ref 0–1)
BILIRUB SERPL-MCNC: 2.4 MG/DL (ref 0–1)
BILIRUB SERPL-MCNC: 2.6 MG/DL (ref 0–1)
BILIRUB SERPL-MCNC: 2.8 MG/DL (ref 0–1)
BILIRUB SERPL-MCNC: 3 MG/DL (ref 0–1)
BILIRUB SERPL-MCNC: 3.3 MG/DL (ref 0–1)
BILIRUB SERPL-MCNC: 3.6 MG/DL (ref 0–1)
BILIRUB SERPL-MCNC: 3.6 MG/DL (ref 0–1)
BILIRUB SERPL-MCNC: 4.3 MG/DL (ref 0–1)
BILIRUB SERPL-MCNC: 4.6 MG/DL (ref 0–1)
BILIRUB SERPL-MCNC: 5.5 MG/DL (ref 0–1)
BILIRUBIN DIRECT: 1.3 MG/DL (ref 0–0.3)
BILIRUBIN DIRECT: 2.2 MG/DL (ref 0–0.3)
BILIRUBIN URINE: NEGATIVE MG/DL
BILIRUBIN, INDIRECT: 0.7 MG/DL (ref 0–0.7)
BILIRUBIN, INDIRECT: 2.4 MG/DL (ref 0–0.7)
BLOOD, URINE: ABNORMAL
BLOOD, URINE: NEGATIVE
BORDETELLA PERTUSSIS PCR: NOT DETECTED
BUN BLDV-MCNC: 101 MG/DL (ref 6–23)
BUN BLDV-MCNC: 106 MG/DL (ref 6–23)
BUN BLDV-MCNC: 25 MG/DL (ref 6–23)
BUN BLDV-MCNC: 26 MG/DL (ref 6–23)
BUN BLDV-MCNC: 27 MG/DL (ref 6–23)
BUN BLDV-MCNC: 30 MG/DL (ref 6–23)
BUN BLDV-MCNC: 32 MG/DL (ref 6–23)
BUN BLDV-MCNC: 32 MG/DL (ref 6–23)
BUN BLDV-MCNC: 34 MG/DL (ref 6–23)
BUN BLDV-MCNC: 34 MG/DL (ref 6–23)
BUN BLDV-MCNC: 36 MG/DL (ref 6–23)
BUN BLDV-MCNC: 42 MG/DL (ref 6–23)
BUN BLDV-MCNC: 47 MG/DL (ref 6–23)
BUN BLDV-MCNC: 51 MG/DL (ref 6–23)
BUN BLDV-MCNC: 51 MG/DL (ref 6–23)
BUN BLDV-MCNC: 65 MG/DL (ref 6–23)
BUN BLDV-MCNC: 70 MG/DL (ref 6–23)
BUN BLDV-MCNC: 72 MG/DL (ref 6–23)
BUN BLDV-MCNC: 73 MG/DL (ref 6–23)
BUN BLDV-MCNC: 74 MG/DL (ref 6–23)
BUN BLDV-MCNC: 77 MG/DL (ref 6–23)
BUN BLDV-MCNC: 83 MG/DL (ref 6–23)
BUN BLDV-MCNC: 95 MG/DL (ref 6–23)
CALCIUM OXALATE CRYSTALS: ABNORMAL /HPF
CALCIUM SERPL-MCNC: 7.6 MG/DL (ref 8.3–10.6)
CALCIUM SERPL-MCNC: 7.7 MG/DL (ref 8.3–10.6)
CALCIUM SERPL-MCNC: 7.7 MG/DL (ref 8.3–10.6)
CALCIUM SERPL-MCNC: 7.8 MG/DL (ref 8.3–10.6)
CALCIUM SERPL-MCNC: 7.9 MG/DL (ref 8.3–10.6)
CALCIUM SERPL-MCNC: 8 MG/DL (ref 8.3–10.6)
CALCIUM SERPL-MCNC: 8.1 MG/DL (ref 8.3–10.6)
CALCIUM SERPL-MCNC: 8.2 MG/DL (ref 8.3–10.6)
CALCIUM SERPL-MCNC: 8.4 MG/DL (ref 8.3–10.6)
CALCIUM SERPL-MCNC: 8.5 MG/DL (ref 8.3–10.6)
CALCIUM SERPL-MCNC: 9.1 MG/DL (ref 8.3–10.6)
CARBON MONOXIDE, BLOOD: 2.1 % (ref 0–5)
CHLAMYDOPHILA PNEUMONIA PCR: NOT DETECTED
CHLORIDE BLD-SCNC: 100 MMOL/L (ref 99–110)
CHLORIDE BLD-SCNC: 103 MMOL/L (ref 99–110)
CHLORIDE BLD-SCNC: 93 MMOL/L (ref 99–110)
CHLORIDE BLD-SCNC: 95 MMOL/L (ref 99–110)
CHLORIDE BLD-SCNC: 96 MMOL/L (ref 99–110)
CHLORIDE BLD-SCNC: 97 MMOL/L (ref 99–110)
CHLORIDE BLD-SCNC: 97 MMOL/L (ref 99–110)
CHLORIDE BLD-SCNC: 98 MMOL/L (ref 99–110)
CHLORIDE BLD-SCNC: 99 MMOL/L (ref 99–110)
CHOLESTEROL: 56 MG/DL
CHP ED QC CHECK: 126
CHP ED QC CHECK: 95
CLARITY: ABNORMAL
CLARITY: CLEAR
CLARITY: CLEAR
CO2 CONTENT: 23.2 MMOL/L (ref 19–24)
CO2: 14 MMOL/L (ref 21–32)
CO2: 16 MMOL/L (ref 21–32)
CO2: 17 MMOL/L (ref 21–32)
CO2: 19 MMOL/L (ref 21–32)
CO2: 20 MMOL/L (ref 21–32)
CO2: 21 MMOL/L (ref 21–32)
CO2: 21 MMOL/L (ref 21–32)
CO2: 22 MMOL/L (ref 21–32)
CO2: 23 MMOL/L (ref 21–32)
CO2: 24 MMOL/L (ref 21–32)
CO2: 25 MMOL/L (ref 21–32)
CO2: 25 MMOL/L (ref 21–32)
CO2: 27 MMOL/L (ref 21–32)
COLOR: ABNORMAL
COLOR: YELLOW
COMMENT: ABNORMAL
COMPONENT: NORMAL
COMPONENT: NORMAL
CORONAVIRUS 229E PCR: NOT DETECTED
CORONAVIRUS HKU1 PCR: NOT DETECTED
CORONAVIRUS NL63 PCR: NOT DETECTED
CORONAVIRUS OC43 PCR: NOT DETECTED
CREAT SERPL-MCNC: 0.7 MG/DL (ref 0.6–1.1)
CREAT SERPL-MCNC: 0.7 MG/DL (ref 0.6–1.1)
CREAT SERPL-MCNC: 0.8 MG/DL (ref 0.6–1.1)
CREAT SERPL-MCNC: 0.9 MG/DL (ref 0.6–1.1)
CREAT SERPL-MCNC: 0.9 MG/DL (ref 0.6–1.1)
CREAT SERPL-MCNC: 1 MG/DL (ref 0.6–1.1)
CREAT SERPL-MCNC: 1.1 MG/DL (ref 0.6–1.1)
CREAT SERPL-MCNC: 1.1 MG/DL (ref 0.6–1.1)
CREAT SERPL-MCNC: 1.2 MG/DL (ref 0.6–1.1)
CREAT SERPL-MCNC: 1.3 MG/DL (ref 0.6–1.1)
CREAT SERPL-MCNC: 1.3 MG/DL (ref 0.6–1.1)
CREAT SERPL-MCNC: 1.6 MG/DL (ref 0.6–1.1)
CREAT SERPL-MCNC: 1.6 MG/DL (ref 0.6–1.1)
CREAT SERPL-MCNC: 1.7 MG/DL (ref 0.6–1.1)
CREAT SERPL-MCNC: 1.7 MG/DL (ref 0.6–1.1)
CREAT SERPL-MCNC: 1.8 MG/DL (ref 0.6–1.1)
CREAT SERPL-MCNC: 2 MG/DL (ref 0.6–1.1)
CREAT SERPL-MCNC: 2.8 MG/DL (ref 0.6–1.1)
CREAT SERPL-MCNC: 3.6 MG/DL (ref 0.6–1.1)
CREATININE URINE: 112.1 MG/DL (ref 28–217)
CREATININE URINE: 42.6 MG/DL (ref 28–217)
CREATININE URINE: 42.6 MG/DL (ref 28–217)
CREATININE URINE: 93.7 MG/DL (ref 28–217)
CROSSMATCH RESULT: NORMAL
CULTURE: ABNORMAL
CULTURE: NORMAL
D DIMER: 4083 NG/ML(DDU)
D DIMER: 4778 NG/ML(DDU)
DIFFERENTIAL TYPE: ABNORMAL
DOSE AMOUNT: ABNORMAL
DOSE TIME: ABNORMAL
EKG ATRIAL RATE: 108 BPM
EKG ATRIAL RATE: 117 BPM
EKG ATRIAL RATE: 80 BPM
EKG ATRIAL RATE: 96 BPM
EKG DIAGNOSIS: NORMAL
EKG P AXIS: 44 DEGREES
EKG P AXIS: 88 DEGREES
EKG P AXIS: 89 DEGREES
EKG P-R INTERVAL: 146 MS
EKG P-R INTERVAL: 150 MS
EKG P-R INTERVAL: 152 MS
EKG Q-T INTERVAL: 332 MS
EKG Q-T INTERVAL: 358 MS
EKG Q-T INTERVAL: 364 MS
EKG Q-T INTERVAL: 432 MS
EKG QRS DURATION: 100 MS
EKG QRS DURATION: 100 MS
EKG QRS DURATION: 82 MS
EKG QRS DURATION: 90 MS
EKG QTC CALCULATION (BAZETT): 457 MS
EKG QTC CALCULATION (BAZETT): 459 MS
EKG QTC CALCULATION (BAZETT): 479 MS
EKG QTC CALCULATION (BAZETT): 498 MS
EKG R AXIS: -10 DEGREES
EKG R AXIS: -10 DEGREES
EKG R AXIS: -5 DEGREES
EKG R AXIS: 3 DEGREES
EKG T AXIS: 14 DEGREES
EKG T AXIS: 15 DEGREES
EKG T AXIS: 33 DEGREES
EKG T AXIS: 7 DEGREES
EKG VENTRICULAR RATE: 108 BPM
EKG VENTRICULAR RATE: 114 BPM
EKG VENTRICULAR RATE: 80 BPM
EKG VENTRICULAR RATE: 96 BPM
EMERGENT DISEASE RESULT: NORMAL
EMERGENT DISEASE RESULT: NOT DETECTED
EOSINOPHILS ABSOLUTE: 0 K/CU MM
EOSINOPHILS ABSOLUTE: 0.1 K/CU MM
EOSINOPHILS ABSOLUTE: 0.2 K/CU MM
EOSINOPHILS ABSOLUTE: 0.3 K/CU MM
EOSINOPHILS ABSOLUTE: 0.4 K/CU MM
EOSINOPHILS ABSOLUTE: 0.4 K/CU MM
EOSINOPHILS RELATIVE PERCENT: 0.1 % (ref 0–3)
EOSINOPHILS RELATIVE PERCENT: 0.1 % (ref 0–3)
EOSINOPHILS RELATIVE PERCENT: 0.2 % (ref 0–3)
EOSINOPHILS RELATIVE PERCENT: 0.2 % (ref 0–3)
EOSINOPHILS RELATIVE PERCENT: 0.4 % (ref 0–3)
EOSINOPHILS RELATIVE PERCENT: 0.4 % (ref 0–3)
EOSINOPHILS RELATIVE PERCENT: 0.5 % (ref 0–3)
EOSINOPHILS RELATIVE PERCENT: 0.8 % (ref 0–3)
EOSINOPHILS RELATIVE PERCENT: 0.9 % (ref 0–3)
EOSINOPHILS RELATIVE PERCENT: 1 % (ref 0–3)
EOSINOPHILS RELATIVE PERCENT: 1 % (ref 0–3)
EOSINOPHILS RELATIVE PERCENT: 1.5 % (ref 0–3)
EOSINOPHILS RELATIVE PERCENT: 1.9 % (ref 0–3)
EOSINOPHILS RELATIVE PERCENT: 2 % (ref 0–3)
EOSINOPHILS RELATIVE PERCENT: 2.5 % (ref 0–3)
EOSINOPHILS RELATIVE PERCENT: 2.5 % (ref 0–3)
ESTIMATED AVERAGE GLUCOSE: 126 MG/DL
FERRITIN: 311 NG/ML (ref 15–150)
FERRITIN: 473 NG/ML (ref 15–150)
FIBRINOGEN LEVEL: 155 MG/DL (ref 196.9–442.1)
FIBRINOGEN LEVEL: 177 MG/DL (ref 196.9–442.1)
FOLATE: 5.7 NG/ML (ref 3.1–17.5)
GFR AFRICAN AMERICAN: 16 ML/MIN/1.73M2
GFR AFRICAN AMERICAN: 21 ML/MIN/1.73M2
GFR AFRICAN AMERICAN: 31 ML/MIN/1.73M2
GFR AFRICAN AMERICAN: 35 ML/MIN/1.73M2
GFR AFRICAN AMERICAN: 37 ML/MIN/1.73M2
GFR AFRICAN AMERICAN: 37 ML/MIN/1.73M2
GFR AFRICAN AMERICAN: 40 ML/MIN/1.73M2
GFR AFRICAN AMERICAN: 40 ML/MIN/1.73M2
GFR AFRICAN AMERICAN: 51 ML/MIN/1.73M2
GFR AFRICAN AMERICAN: 51 ML/MIN/1.73M2
GFR AFRICAN AMERICAN: 56 ML/MIN/1.73M2
GFR AFRICAN AMERICAN: >60 ML/MIN/1.73M2
GFR NON-AFRICAN AMERICAN: 13 ML/MIN/1.73M2
GFR NON-AFRICAN AMERICAN: 17 ML/MIN/1.73M2
GFR NON-AFRICAN AMERICAN: 26 ML/MIN/1.73M2
GFR NON-AFRICAN AMERICAN: 29 ML/MIN/1.73M2
GFR NON-AFRICAN AMERICAN: 31 ML/MIN/1.73M2
GFR NON-AFRICAN AMERICAN: 31 ML/MIN/1.73M2
GFR NON-AFRICAN AMERICAN: 33 ML/MIN/1.73M2
GFR NON-AFRICAN AMERICAN: 33 ML/MIN/1.73M2
GFR NON-AFRICAN AMERICAN: 42 ML/MIN/1.73M2
GFR NON-AFRICAN AMERICAN: 42 ML/MIN/1.73M2
GFR NON-AFRICAN AMERICAN: 46 ML/MIN/1.73M2
GFR NON-AFRICAN AMERICAN: 51 ML/MIN/1.73M2
GFR NON-AFRICAN AMERICAN: 51 ML/MIN/1.73M2
GFR NON-AFRICAN AMERICAN: 57 ML/MIN/1.73M2
GFR NON-AFRICAN AMERICAN: >60 ML/MIN/1.73M2
GIANT PLATELETS: PRESENT
GLUCOSE BLD-MCNC: 101 MG/DL (ref 70–99)
GLUCOSE BLD-MCNC: 102 MG/DL (ref 70–99)
GLUCOSE BLD-MCNC: 102 MG/DL (ref 70–99)
GLUCOSE BLD-MCNC: 104 MG/DL (ref 70–99)
GLUCOSE BLD-MCNC: 104 MG/DL (ref 70–99)
GLUCOSE BLD-MCNC: 106 MG/DL (ref 70–99)
GLUCOSE BLD-MCNC: 107 MG/DL (ref 70–99)
GLUCOSE BLD-MCNC: 108 MG/DL (ref 70–99)
GLUCOSE BLD-MCNC: 109 MG/DL (ref 70–99)
GLUCOSE BLD-MCNC: 110 MG/DL (ref 70–99)
GLUCOSE BLD-MCNC: 110 MG/DL (ref 70–99)
GLUCOSE BLD-MCNC: 112 MG/DL (ref 70–99)
GLUCOSE BLD-MCNC: 113 MG/DL (ref 70–99)
GLUCOSE BLD-MCNC: 114 MG/DL (ref 70–99)
GLUCOSE BLD-MCNC: 115 MG/DL (ref 70–99)
GLUCOSE BLD-MCNC: 117 MG/DL (ref 70–99)
GLUCOSE BLD-MCNC: 117 MG/DL (ref 70–99)
GLUCOSE BLD-MCNC: 118 MG/DL (ref 70–99)
GLUCOSE BLD-MCNC: 119 MG/DL (ref 70–99)
GLUCOSE BLD-MCNC: 119 MG/DL (ref 70–99)
GLUCOSE BLD-MCNC: 120 MG/DL (ref 70–99)
GLUCOSE BLD-MCNC: 121 MG/DL (ref 70–99)
GLUCOSE BLD-MCNC: 122 MG/DL (ref 70–99)
GLUCOSE BLD-MCNC: 122 MG/DL (ref 70–99)
GLUCOSE BLD-MCNC: 125 MG/DL (ref 70–99)
GLUCOSE BLD-MCNC: 126 MG/DL (ref 70–99)
GLUCOSE BLD-MCNC: 133 MG/DL (ref 70–99)
GLUCOSE BLD-MCNC: 133 MG/DL (ref 70–99)
GLUCOSE BLD-MCNC: 138 MG/DL (ref 70–99)
GLUCOSE BLD-MCNC: 139 MG/DL (ref 70–99)
GLUCOSE BLD-MCNC: 140 MG/DL (ref 70–99)
GLUCOSE BLD-MCNC: 141 MG/DL (ref 70–99)
GLUCOSE BLD-MCNC: 141 MG/DL (ref 70–99)
GLUCOSE BLD-MCNC: 142 MG/DL (ref 70–99)
GLUCOSE BLD-MCNC: 143 MG/DL (ref 70–99)
GLUCOSE BLD-MCNC: 146 MG/DL (ref 70–99)
GLUCOSE BLD-MCNC: 148 MG/DL (ref 70–99)
GLUCOSE BLD-MCNC: 150 MG/DL (ref 70–99)
GLUCOSE BLD-MCNC: 152 MG/DL (ref 70–99)
GLUCOSE BLD-MCNC: 153 MG/DL (ref 70–99)
GLUCOSE BLD-MCNC: 153 MG/DL (ref 70–99)
GLUCOSE BLD-MCNC: 154 MG/DL (ref 70–99)
GLUCOSE BLD-MCNC: 154 MG/DL (ref 70–99)
GLUCOSE BLD-MCNC: 160 MG/DL (ref 70–99)
GLUCOSE BLD-MCNC: 160 MG/DL (ref 70–99)
GLUCOSE BLD-MCNC: 161 MG/DL (ref 70–99)
GLUCOSE BLD-MCNC: 162 MG/DL (ref 70–99)
GLUCOSE BLD-MCNC: 164 MG/DL (ref 70–99)
GLUCOSE BLD-MCNC: 164 MG/DL (ref 70–99)
GLUCOSE BLD-MCNC: 165 MG/DL (ref 70–99)
GLUCOSE BLD-MCNC: 169 MG/DL (ref 70–99)
GLUCOSE BLD-MCNC: 172 MG/DL (ref 70–99)
GLUCOSE BLD-MCNC: 173 MG/DL (ref 70–99)
GLUCOSE BLD-MCNC: 173 MG/DL (ref 70–99)
GLUCOSE BLD-MCNC: 174 MG/DL (ref 70–99)
GLUCOSE BLD-MCNC: 177 MG/DL (ref 70–99)
GLUCOSE BLD-MCNC: 180 MG/DL (ref 70–99)
GLUCOSE BLD-MCNC: 181 MG/DL (ref 70–99)
GLUCOSE BLD-MCNC: 181 MG/DL (ref 70–99)
GLUCOSE BLD-MCNC: 182 MG/DL (ref 70–99)
GLUCOSE BLD-MCNC: 182 MG/DL (ref 70–99)
GLUCOSE BLD-MCNC: 183 MG/DL (ref 70–99)
GLUCOSE BLD-MCNC: 184 MG/DL (ref 70–99)
GLUCOSE BLD-MCNC: 184 MG/DL (ref 70–99)
GLUCOSE BLD-MCNC: 185 MG/DL (ref 70–99)
GLUCOSE BLD-MCNC: 186 MG/DL (ref 70–99)
GLUCOSE BLD-MCNC: 187 MG/DL (ref 70–99)
GLUCOSE BLD-MCNC: 189 MG/DL (ref 70–99)
GLUCOSE BLD-MCNC: 189 MG/DL (ref 70–99)
GLUCOSE BLD-MCNC: 190 MG/DL (ref 70–99)
GLUCOSE BLD-MCNC: 191 MG/DL (ref 70–99)
GLUCOSE BLD-MCNC: 191 MG/DL (ref 70–99)
GLUCOSE BLD-MCNC: 192 MG/DL (ref 70–99)
GLUCOSE BLD-MCNC: 192 MG/DL (ref 70–99)
GLUCOSE BLD-MCNC: 193 MG/DL (ref 70–99)
GLUCOSE BLD-MCNC: 194 MG/DL (ref 70–99)
GLUCOSE BLD-MCNC: 199 MG/DL (ref 70–99)
GLUCOSE BLD-MCNC: 206 MG/DL (ref 70–99)
GLUCOSE BLD-MCNC: 207 MG/DL (ref 70–99)
GLUCOSE BLD-MCNC: 207 MG/DL (ref 70–99)
GLUCOSE BLD-MCNC: 209 MG/DL (ref 70–99)
GLUCOSE BLD-MCNC: 212 MG/DL (ref 70–99)
GLUCOSE BLD-MCNC: 212 MG/DL (ref 70–99)
GLUCOSE BLD-MCNC: 215 MG/DL (ref 70–99)
GLUCOSE BLD-MCNC: 216 MG/DL (ref 70–99)
GLUCOSE BLD-MCNC: 224 MG/DL (ref 70–99)
GLUCOSE BLD-MCNC: 229 MG/DL (ref 70–99)
GLUCOSE BLD-MCNC: 237 MG/DL (ref 70–99)
GLUCOSE BLD-MCNC: 240 MG/DL (ref 70–99)
GLUCOSE BLD-MCNC: 246 MG/DL (ref 70–99)
GLUCOSE BLD-MCNC: 248 MG/DL (ref 70–99)
GLUCOSE BLD-MCNC: 259 MG/DL (ref 70–99)
GLUCOSE BLD-MCNC: 32 MG/DL (ref 70–99)
GLUCOSE BLD-MCNC: 323 MG/DL (ref 70–99)
GLUCOSE BLD-MCNC: 33 MG/DL (ref 70–99)
GLUCOSE BLD-MCNC: 37 MG/DL (ref 70–99)
GLUCOSE BLD-MCNC: 390 MG/DL (ref 70–99)
GLUCOSE BLD-MCNC: 44 MG/DL (ref 70–99)
GLUCOSE BLD-MCNC: 47 MG/DL (ref 70–99)
GLUCOSE BLD-MCNC: 50 MG/DL (ref 70–99)
GLUCOSE BLD-MCNC: 51 MG/DL (ref 70–99)
GLUCOSE BLD-MCNC: 54 MG/DL (ref 70–99)
GLUCOSE BLD-MCNC: 57 MG/DL (ref 70–99)
GLUCOSE BLD-MCNC: 58 MG/DL (ref 70–99)
GLUCOSE BLD-MCNC: 58 MG/DL (ref 70–99)
GLUCOSE BLD-MCNC: 60 MG/DL (ref 70–99)
GLUCOSE BLD-MCNC: 64 MG/DL (ref 70–99)
GLUCOSE BLD-MCNC: 65 MG/DL
GLUCOSE BLD-MCNC: 65 MG/DL (ref 70–99)
GLUCOSE BLD-MCNC: 65 MG/DL (ref 70–99)
GLUCOSE BLD-MCNC: 71 MG/DL (ref 70–99)
GLUCOSE BLD-MCNC: 73 MG/DL (ref 70–99)
GLUCOSE BLD-MCNC: 75 MG/DL (ref 70–99)
GLUCOSE BLD-MCNC: 76 MG/DL (ref 70–99)
GLUCOSE BLD-MCNC: 77 MG/DL (ref 70–99)
GLUCOSE BLD-MCNC: 78 MG/DL (ref 70–99)
GLUCOSE BLD-MCNC: 79 MG/DL (ref 70–99)
GLUCOSE BLD-MCNC: 81 MG/DL (ref 70–99)
GLUCOSE BLD-MCNC: 86 MG/DL (ref 70–99)
GLUCOSE BLD-MCNC: 87 MG/DL (ref 70–99)
GLUCOSE BLD-MCNC: 90 MG/DL (ref 70–99)
GLUCOSE BLD-MCNC: 90 MG/DL (ref 70–99)
GLUCOSE BLD-MCNC: 92 MG/DL (ref 70–99)
GLUCOSE BLD-MCNC: 92 MG/DL (ref 70–99)
GLUCOSE BLD-MCNC: 94 MG/DL (ref 70–99)
GLUCOSE BLD-MCNC: 95 MG/DL (ref 70–99)
GLUCOSE BLD-MCNC: 98 MG/DL (ref 70–99)
GLUCOSE, URINE: NEGATIVE MG/DL
GRANULAR CASTS: 3 /LPF
HBA1C MFR BLD: 6 % (ref 4.2–6.3)
HCO3 ARTERIAL: 22 MMOL/L (ref 18–23)
HCO3 VENOUS: 21.1 MMOL/L (ref 19–25)
HCT VFR BLD CALC: 20.7 % (ref 37–47)
HCT VFR BLD CALC: 22.2 % (ref 37–47)
HCT VFR BLD CALC: 22.4 % (ref 37–47)
HCT VFR BLD CALC: 23.6 % (ref 37–47)
HCT VFR BLD CALC: 24 % (ref 37–47)
HCT VFR BLD CALC: 25.1 % (ref 37–47)
HCT VFR BLD CALC: 25.3 % (ref 37–47)
HCT VFR BLD CALC: 25.6 % (ref 37–47)
HCT VFR BLD CALC: 26.4 % (ref 37–47)
HCT VFR BLD CALC: 27 % (ref 37–47)
HCT VFR BLD CALC: 28.6 % (ref 37–47)
HCT VFR BLD CALC: 28.7 % (ref 37–47)
HCT VFR BLD CALC: 28.8 % (ref 37–47)
HCT VFR BLD CALC: 29.7 % (ref 37–47)
HCT VFR BLD CALC: 30.2 % (ref 37–47)
HCT VFR BLD CALC: 30.8 % (ref 37–47)
HCT VFR BLD CALC: 32.5 % (ref 37–47)
HCT VFR BLD CALC: 33.8 % (ref 37–47)
HCT VFR BLD CALC: 34.7 % (ref 37–47)
HCT VFR BLD CALC: 35.2 % (ref 37–47)
HCT VFR BLD CALC: 35.3 % (ref 37–47)
HCT VFR BLD CALC: 35.4 % (ref 37–47)
HCT VFR BLD CALC: 36.1 % (ref 37–47)
HCT VFR BLD CALC: 39.2 % (ref 37–47)
HDLC SERPL-MCNC: 15 MG/DL
HEMOGLOBIN: 10 GM/DL (ref 12.5–16)
HEMOGLOBIN: 10.1 GM/DL (ref 12.5–16)
HEMOGLOBIN: 10.2 GM/DL (ref 12.5–16)
HEMOGLOBIN: 10.8 GM/DL (ref 12.5–16)
HEMOGLOBIN: 10.9 GM/DL (ref 12.5–16)
HEMOGLOBIN: 11.4 GM/DL (ref 12.5–16)
HEMOGLOBIN: 12.4 GM/DL (ref 12.5–16)
HEMOGLOBIN: 6.6 GM/DL (ref 12.5–16)
HEMOGLOBIN: 7 GM/DL (ref 12.5–16)
HEMOGLOBIN: 7 GM/DL (ref 12.5–16)
HEMOGLOBIN: 7.4 GM/DL (ref 12.5–16)
HEMOGLOBIN: 7.7 GM/DL (ref 12.5–16)
HEMOGLOBIN: 7.8 GM/DL (ref 12.5–16)
HEMOGLOBIN: 8 GM/DL (ref 12.5–16)
HEMOGLOBIN: 8.2 GM/DL (ref 12.5–16)
HEMOGLOBIN: 8.4 GM/DL (ref 12.5–16)
HEMOGLOBIN: 8.6 GM/DL (ref 12.5–16)
HEMOGLOBIN: 8.7 GM/DL (ref 12.5–16)
HEMOGLOBIN: 8.8 GM/DL (ref 12.5–16)
HEMOGLOBIN: 8.8 GM/DL (ref 12.5–16)
HEMOGLOBIN: 8.9 GM/DL (ref 12.5–16)
HEMOGLOBIN: 9.2 GM/DL (ref 12.5–16)
HEMOGLOBIN: 9.6 GM/DL (ref 12.5–16)
HEMOGLOBIN: 9.6 GM/DL (ref 12.5–16)
HIGH SENSITIVE C-REACTIVE PROTEIN: 106.6 MG/L
HIGH SENSITIVE C-REACTIVE PROTEIN: 121.2 MG/L
HIGH SENSITIVE C-REACTIVE PROTEIN: 152 MG/L
HIGH SENSITIVE C-REACTIVE PROTEIN: 163.9 MG/L
HIGH SENSITIVE C-REACTIVE PROTEIN: 182.8 MG/L
HIGH SENSITIVE C-REACTIVE PROTEIN: 219.2 MG/L
HIGH SENSITIVE C-REACTIVE PROTEIN: 88.5 MG/L
HIGH SENSITIVE C-REACTIVE PROTEIN: 88.8 MG/L
HUMAN METAPNEUMOVIRUS PCR: NOT DETECTED
HYALINE CASTS: 0 /LPF
HYALINE CASTS: 2 /LPF
HYALINE CASTS: 2 /LPF
HYALINE CASTS: >20 /LPF
IMMATURE NEUTROPHIL %: 0.5 % (ref 0–0.43)
IMMATURE NEUTROPHIL %: 0.6 % (ref 0–0.43)
IMMATURE NEUTROPHIL %: 0.6 % (ref 0–0.43)
IMMATURE NEUTROPHIL %: 0.7 % (ref 0–0.43)
IMMATURE NEUTROPHIL %: 1 % (ref 0–0.43)
IMMATURE NEUTROPHIL %: 1.2 % (ref 0–0.43)
IMMATURE NEUTROPHIL %: 1.3 % (ref 0–0.43)
IMMATURE NEUTROPHIL %: 2.2 % (ref 0–0.43)
IMMATURE NEUTROPHIL %: 2.3 % (ref 0–0.43)
IMMATURE NEUTROPHIL %: 2.4 % (ref 0–0.43)
IMMATURE NEUTROPHIL %: 3.2 % (ref 0–0.43)
IMMATURE NEUTROPHIL %: 3.6 % (ref 0–0.43)
IMMATURE NEUTROPHIL %: 4 % (ref 0–0.43)
INFLUENZA A BY PCR: NOT DETECTED
INFLUENZA A H1 (2009) PCR: NOT DETECTED
INFLUENZA A H1 PANDEMIC PCR: NOT DETECTED
INFLUENZA A H3 PCR: NOT DETECTED
INFLUENZA B BY PCR: NOT DETECTED
INR BLD: 1.63 INDEX
INR BLD: 1.64 INDEX
INR BLD: 1.64 INDEX
INR BLD: 1.8 INDEX
INR BLD: 1.88 INDEX
INR BLD: 5.39 INDEX
IRON: 40 UG/DL (ref 37–145)
IRON: 66 UG/DL (ref 37–145)
KETONES, URINE: NEGATIVE MG/DL
LACTATE DEHYDROGENASE: 228 IU/L (ref 120–246)
LACTATE: 2.9 MMOL/L (ref 0.4–2)
LACTATE: 3.7 MMOL/L (ref 0.4–2)
LACTATE: 4.7 MMOL/L (ref 0.4–2)
LACTATE: 5.1 MMOL/L (ref 0.4–2)
LACTATE: 6 MMOL/L (ref 0.4–2)
LACTATE: ABNORMAL MMOL/L (ref 0.4–2)
LACTIC ACID, SEPSIS: 2.6 MMOL/L (ref 0.5–1.9)
LACTIC ACID, SEPSIS: 4 MMOL/L (ref 0.5–1.9)
LACTIC ACID, SEPSIS: 4.2 MMOL/L (ref 0.5–1.9)
LDL CHOLESTEROL DIRECT: 28 MG/DL
LEGIONELLA URINARY AG: NEGATIVE
LEUKOCYTE ESTERASE, URINE: ABNORMAL
LEUKOCYTE ESTERASE, URINE: NEGATIVE
LEUKOCYTE ESTERASE, URINE: NEGATIVE
LIPASE: 28 IU/L (ref 13–60)
LIPASE: 50 IU/L (ref 13–60)
LIPASE: 61 IU/L (ref 13–60)
LIPASE: 63 IU/L (ref 13–60)
LIPASE: 70 IU/L (ref 13–60)
LV EF: 53 %
LVEF MODALITY: NORMAL
LYMPHOCYTES ABSOLUTE: 0.1 K/CU MM
LYMPHOCYTES ABSOLUTE: 0.4 K/CU MM
LYMPHOCYTES ABSOLUTE: 0.5 K/CU MM
LYMPHOCYTES ABSOLUTE: 0.5 K/CU MM
LYMPHOCYTES ABSOLUTE: 0.6 K/CU MM
LYMPHOCYTES ABSOLUTE: 0.9 K/CU MM
LYMPHOCYTES ABSOLUTE: 1 K/CU MM
LYMPHOCYTES ABSOLUTE: 1.1 K/CU MM
LYMPHOCYTES ABSOLUTE: 1.1 K/CU MM
LYMPHOCYTES ABSOLUTE: 1.4 K/CU MM
LYMPHOCYTES ABSOLUTE: 1.4 K/CU MM
LYMPHOCYTES ABSOLUTE: 1.6 K/CU MM
LYMPHOCYTES ABSOLUTE: 1.7 K/CU MM
LYMPHOCYTES ABSOLUTE: 2.4 K/CU MM
LYMPHOCYTES ABSOLUTE: 4.3 K/CU MM
LYMPHOCYTES RELATIVE PERCENT: 1 % (ref 24–44)
LYMPHOCYTES RELATIVE PERCENT: 10.2 % (ref 24–44)
LYMPHOCYTES RELATIVE PERCENT: 11 % (ref 24–44)
LYMPHOCYTES RELATIVE PERCENT: 11.7 % (ref 24–44)
LYMPHOCYTES RELATIVE PERCENT: 12.2 % (ref 24–44)
LYMPHOCYTES RELATIVE PERCENT: 23 % (ref 24–44)
LYMPHOCYTES RELATIVE PERCENT: 3 % (ref 24–44)
LYMPHOCYTES RELATIVE PERCENT: 3.4 % (ref 24–44)
LYMPHOCYTES RELATIVE PERCENT: 4 % (ref 24–44)
LYMPHOCYTES RELATIVE PERCENT: 4.2 % (ref 24–44)
LYMPHOCYTES RELATIVE PERCENT: 5.1 % (ref 24–44)
LYMPHOCYTES RELATIVE PERCENT: 5.2 % (ref 24–44)
LYMPHOCYTES RELATIVE PERCENT: 7 % (ref 24–44)
LYMPHOCYTES RELATIVE PERCENT: 7 % (ref 24–44)
LYMPHOCYTES RELATIVE PERCENT: 7.7 % (ref 24–44)
LYMPHOCYTES RELATIVE PERCENT: 8.9 % (ref 24–44)
LYMPHOCYTES RELATIVE PERCENT: 9 % (ref 24–44)
LYMPHOCYTES RELATIVE PERCENT: 9.1 % (ref 24–44)
LYMPHOCYTES RELATIVE PERCENT: 9.4 % (ref 24–44)
LYMPHOCYTES RELATIVE PERCENT: 9.6 % (ref 24–44)
Lab: ABNORMAL
Lab: ABNORMAL
Lab: NORMAL
MAGNESIUM: 1.4 MG/DL (ref 1.8–2.4)
MAGNESIUM: 1.5 MG/DL (ref 1.8–2.4)
MAGNESIUM: 1.6 MG/DL (ref 1.8–2.4)
MAGNESIUM: 1.6 MG/DL (ref 1.8–2.4)
MAGNESIUM: 1.7 MG/DL (ref 1.8–2.4)
MAGNESIUM: 1.7 MG/DL (ref 1.8–2.4)
MAGNESIUM: 1.8 MG/DL (ref 1.8–2.4)
MAGNESIUM: 2 MG/DL (ref 1.8–2.4)
MAGNESIUM: 2.1 MG/DL (ref 1.8–2.4)
MCH RBC QN AUTO: 26.5 PG (ref 27–31)
MCH RBC QN AUTO: 26.6 PG (ref 27–31)
MCH RBC QN AUTO: 26.7 PG (ref 27–31)
MCH RBC QN AUTO: 26.9 PG (ref 27–31)
MCH RBC QN AUTO: 27.1 PG (ref 27–31)
MCH RBC QN AUTO: 27.2 PG (ref 27–31)
MCH RBC QN AUTO: 27.3 PG (ref 27–31)
MCH RBC QN AUTO: 27.5 PG (ref 27–31)
MCH RBC QN AUTO: 27.6 PG (ref 27–31)
MCH RBC QN AUTO: 27.6 PG (ref 27–31)
MCH RBC QN AUTO: 27.7 PG (ref 27–31)
MCH RBC QN AUTO: 28.3 PG (ref 27–31)
MCHC RBC AUTO-ENTMCNC: 27.1 % (ref 32–36)
MCHC RBC AUTO-ENTMCNC: 28.9 % (ref 32–36)
MCHC RBC AUTO-ENTMCNC: 29.9 % (ref 32–36)
MCHC RBC AUTO-ENTMCNC: 30 % (ref 32–36)
MCHC RBC AUTO-ENTMCNC: 30.4 % (ref 32–36)
MCHC RBC AUTO-ENTMCNC: 30.5 % (ref 32–36)
MCHC RBC AUTO-ENTMCNC: 30.6 % (ref 32–36)
MCHC RBC AUTO-ENTMCNC: 30.7 % (ref 32–36)
MCHC RBC AUTO-ENTMCNC: 30.8 % (ref 32–36)
MCHC RBC AUTO-ENTMCNC: 31 % (ref 32–36)
MCHC RBC AUTO-ENTMCNC: 31.1 % (ref 32–36)
MCHC RBC AUTO-ENTMCNC: 31.1 % (ref 32–36)
MCHC RBC AUTO-ENTMCNC: 31.2 % (ref 32–36)
MCHC RBC AUTO-ENTMCNC: 31.3 % (ref 32–36)
MCHC RBC AUTO-ENTMCNC: 31.3 % (ref 32–36)
MCHC RBC AUTO-ENTMCNC: 31.4 % (ref 32–36)
MCHC RBC AUTO-ENTMCNC: 31.5 % (ref 32–36)
MCHC RBC AUTO-ENTMCNC: 31.6 % (ref 32–36)
MCHC RBC AUTO-ENTMCNC: 31.6 % (ref 32–36)
MCHC RBC AUTO-ENTMCNC: 31.8 % (ref 32–36)
MCHC RBC AUTO-ENTMCNC: 31.9 % (ref 32–36)
MCHC RBC AUTO-ENTMCNC: 31.9 % (ref 32–36)
MCHC RBC AUTO-ENTMCNC: 32.4 % (ref 32–36)
MCV RBC AUTO: 85.5 FL (ref 78–100)
MCV RBC AUTO: 86.3 FL (ref 78–100)
MCV RBC AUTO: 86.5 FL (ref 78–100)
MCV RBC AUTO: 86.8 FL (ref 78–100)
MCV RBC AUTO: 86.9 FL (ref 78–100)
MCV RBC AUTO: 87 FL (ref 78–100)
MCV RBC AUTO: 87 FL (ref 78–100)
MCV RBC AUTO: 87.1 FL (ref 78–100)
MCV RBC AUTO: 87.2 FL (ref 78–100)
MCV RBC AUTO: 87.3 FL (ref 78–100)
MCV RBC AUTO: 87.4 FL (ref 78–100)
MCV RBC AUTO: 87.7 FL (ref 78–100)
MCV RBC AUTO: 88.3 FL (ref 78–100)
MCV RBC AUTO: 88.3 FL (ref 78–100)
MCV RBC AUTO: 88.7 FL (ref 78–100)
MCV RBC AUTO: 88.7 FL (ref 78–100)
MCV RBC AUTO: 89.9 FL (ref 78–100)
MCV RBC AUTO: 90.6 FL (ref 78–100)
MCV RBC AUTO: 91.7 FL (ref 78–100)
MCV RBC AUTO: 99.2 FL (ref 78–100)
METAMYELOCYTES ABSOLUTE COUNT: 0.16 K/CU MM
METAMYELOCYTES ABSOLUTE COUNT: 0.31 K/CU MM
METAMYELOCYTES ABSOLUTE COUNT: 0.47 K/CU MM
METAMYELOCYTES PERCENT: 1 %
METAMYELOCYTES PERCENT: 1 %
METAMYELOCYTES PERCENT: 3 %
METHEMOGLOBIN ARTERIAL: 1.4 %
MONOCYTES ABSOLUTE: 0.3 K/CU MM
MONOCYTES ABSOLUTE: 0.5 K/CU MM
MONOCYTES ABSOLUTE: 0.6 K/CU MM
MONOCYTES ABSOLUTE: 0.7 K/CU MM
MONOCYTES ABSOLUTE: 0.7 K/CU MM
MONOCYTES ABSOLUTE: 0.8 K/CU MM
MONOCYTES ABSOLUTE: 0.8 K/CU MM
MONOCYTES ABSOLUTE: 1 K/CU MM
MONOCYTES ABSOLUTE: 1.2 K/CU MM
MONOCYTES ABSOLUTE: 1.2 K/CU MM
MONOCYTES ABSOLUTE: 1.3 K/CU MM
MONOCYTES ABSOLUTE: 1.4 K/CU MM
MONOCYTES ABSOLUTE: 1.4 K/CU MM
MONOCYTES ABSOLUTE: 1.6 K/CU MM
MONOCYTES ABSOLUTE: 2 K/CU MM
MONOCYTES ABSOLUTE: 2 K/CU MM
MONOCYTES ABSOLUTE: 2.2 K/CU MM
MONOCYTES ABSOLUTE: 2.8 K/CU MM
MONOCYTES RELATIVE PERCENT: 1 % (ref 0–4)
MONOCYTES RELATIVE PERCENT: 10 % (ref 0–4)
MONOCYTES RELATIVE PERCENT: 11.4 % (ref 0–4)
MONOCYTES RELATIVE PERCENT: 11.8 % (ref 0–4)
MONOCYTES RELATIVE PERCENT: 12.5 % (ref 0–4)
MONOCYTES RELATIVE PERCENT: 13 % (ref 0–4)
MONOCYTES RELATIVE PERCENT: 13.1 % (ref 0–4)
MONOCYTES RELATIVE PERCENT: 14.1 % (ref 0–4)
MONOCYTES RELATIVE PERCENT: 16.8 % (ref 0–4)
MONOCYTES RELATIVE PERCENT: 4 % (ref 0–4)
MONOCYTES RELATIVE PERCENT: 4.3 % (ref 0–4)
MONOCYTES RELATIVE PERCENT: 4.5 % (ref 0–4)
MONOCYTES RELATIVE PERCENT: 5 % (ref 0–4)
MONOCYTES RELATIVE PERCENT: 6.5 % (ref 0–4)
MONOCYTES RELATIVE PERCENT: 7 % (ref 0–4)
MONOCYTES RELATIVE PERCENT: 7.8 % (ref 0–4)
MONOCYTES RELATIVE PERCENT: 7.9 % (ref 0–4)
MONOCYTES RELATIVE PERCENT: 9 % (ref 0–4)
MONOCYTES RELATIVE PERCENT: 9.1 % (ref 0–4)
MONOCYTES RELATIVE PERCENT: 9.6 % (ref 0–4)
MUCUS: ABNORMAL HPF
MYCOPLASMA PNEUMONIAE PCR: NOT DETECTED
MYELOCYTE PERCENT: 1 %
MYELOCYTES ABSOLUTE COUNT: 0.16 K/CU MM
NITRITE URINE, QUANTITATIVE: NEGATIVE
NUCLEATED RBC %: 0 %
NUCLEATED RBC %: 0.1 %
NUCLEATED RBC %: 0.2 %
NUCLEATED RBC %: 0.4 %
NUCLEATED RBC %: 0.4 %
NUCLEATED RED BLOOD CELLS: 1
NUCLEATED RED BLOOD CELLS: 3
O2 SAT, VEN: 95.2 % (ref 50–70)
O2 SATURATION: 96.6 % (ref 96–97)
OSMOLALITY URINE: 328 MOS/L (ref 292–1090)
OSMOLALITY URINE: 486 MOS/L (ref 292–1090)
PAPPENHEIMER BODIES: PRESENT
PARAINFLUENZA 1 PCR: NOT DETECTED
PARAINFLUENZA 2 PCR: NOT DETECTED
PARAINFLUENZA 3 PCR: NOT DETECTED
PARAINFLUENZA 4 PCR: NOT DETECTED
PCO2 ARTERIAL: 38 MMHG (ref 32–45)
PCO2, VEN: 47 MMHG (ref 38–52)
PCT TRANSFERRIN: 27 % (ref 10–44)
PCT TRANSFERRIN: <94 % (ref 10–44)
PDW BLD-RTO: 15.6 % (ref 11.7–14.9)
PDW BLD-RTO: 15.7 % (ref 11.7–14.9)
PDW BLD-RTO: 15.8 % (ref 11.7–14.9)
PDW BLD-RTO: 15.8 % (ref 11.7–14.9)
PDW BLD-RTO: 16.1 % (ref 11.7–14.9)
PDW BLD-RTO: 16.4 % (ref 11.7–14.9)
PDW BLD-RTO: 17.4 % (ref 11.7–14.9)
PDW BLD-RTO: 17.9 % (ref 11.7–14.9)
PDW BLD-RTO: 18.8 % (ref 11.7–14.9)
PDW BLD-RTO: 18.8 % (ref 11.7–14.9)
PDW BLD-RTO: 18.9 % (ref 11.7–14.9)
PDW BLD-RTO: 19 % (ref 11.7–14.9)
PDW BLD-RTO: 19 % (ref 11.7–14.9)
PDW BLD-RTO: 19.3 % (ref 11.7–14.9)
PDW BLD-RTO: 19.4 % (ref 11.7–14.9)
PDW BLD-RTO: 19.5 % (ref 11.7–14.9)
PDW BLD-RTO: 19.6 % (ref 11.7–14.9)
PDW BLD-RTO: 19.8 % (ref 11.7–14.9)
PDW BLD-RTO: 19.8 % (ref 11.7–14.9)
PDW BLD-RTO: 21.4 % (ref 11.7–14.9)
PDW BLD-RTO: 22.5 % (ref 11.7–14.9)
PH BLOOD: 7.37 (ref 7.34–7.45)
PH VENOUS: 7.26 (ref 7.32–7.42)
PH, URINE: 5 (ref 5–8)
PHOSPHORUS: 1.4 MG/DL (ref 2.5–4.9)
PHOSPHORUS: 2.5 MG/DL (ref 2.5–4.9)
PHOSPHORUS: 2.6 MG/DL (ref 2.5–4.9)
PHOSPHORUS: 2.7 MG/DL (ref 2.5–4.9)
PHOSPHORUS: 3.8 MG/DL (ref 2.5–4.9)
PLATELET # BLD: 107 K/CU MM (ref 140–440)
PLATELET # BLD: 113 K/CU MM (ref 140–440)
PLATELET # BLD: 116 K/CU MM (ref 140–440)
PLATELET # BLD: 116 K/CU MM (ref 140–440)
PLATELET # BLD: 50 K/CU MM (ref 140–440)
PLATELET # BLD: 55 K/CU MM (ref 140–440)
PLATELET # BLD: 55 K/CU MM (ref 140–440)
PLATELET # BLD: 57 K/CU MM (ref 140–440)
PLATELET # BLD: 61 K/CU MM (ref 140–440)
PLATELET # BLD: 63 K/CU MM (ref 140–440)
PLATELET # BLD: 68 K/CU MM (ref 140–440)
PLATELET # BLD: 71 K/CU MM (ref 140–440)
PLATELET # BLD: 76 K/CU MM (ref 140–440)
PLATELET # BLD: 76 K/CU MM (ref 140–440)
PLATELET # BLD: 82 K/CU MM (ref 140–440)
PLATELET # BLD: 83 K/CU MM (ref 140–440)
PLATELET # BLD: 84 K/CU MM (ref 140–440)
PLATELET # BLD: 86 K/CU MM (ref 140–440)
PLATELET # BLD: 88 K/CU MM (ref 140–440)
PLATELET # BLD: 92 K/CU MM (ref 140–440)
PLATELET # BLD: 94 K/CU MM (ref 140–440)
PLATELET # BLD: 98 K/CU MM (ref 140–440)
PLATELET # BLD: ABNORMAL K/CU MM (ref 140–440)
PLT MORPHOLOGY: ABNORMAL
PMV BLD AUTO: 10.4 FL (ref 7.5–11.1)
PMV BLD AUTO: 10.7 FL (ref 7.5–11.1)
PMV BLD AUTO: 10.9 FL (ref 7.5–11.1)
PMV BLD AUTO: 11.1 FL (ref 7.5–11.1)
PMV BLD AUTO: 11.4 FL (ref 7.5–11.1)
PMV BLD AUTO: 11.4 FL (ref 7.5–11.1)
PMV BLD AUTO: 11.6 FL (ref 7.5–11.1)
PMV BLD AUTO: 11.6 FL (ref 7.5–11.1)
PMV BLD AUTO: 11.7 FL (ref 7.5–11.1)
PMV BLD AUTO: 11.8 FL (ref 7.5–11.1)
PMV BLD AUTO: 11.8 FL (ref 7.5–11.1)
PMV BLD AUTO: 12 FL (ref 7.5–11.1)
PMV BLD AUTO: 12.2 FL (ref 7.5–11.1)
PMV BLD AUTO: 12.5 FL (ref 7.5–11.1)
PMV BLD AUTO: 12.7 FL (ref 7.5–11.1)
PMV BLD AUTO: 13.4 FL (ref 7.5–11.1)
PO2 ARTERIAL: 144 MMHG (ref 75–100)
PO2, VEN: 131 MMHG (ref 28–48)
POLYCHROMASIA: ABNORMAL
POTASSIUM SERPL-SCNC: 3.1 MMOL/L (ref 3.5–5.1)
POTASSIUM SERPL-SCNC: 3.8 MMOL/L (ref 3.5–5.1)
POTASSIUM SERPL-SCNC: 3.9 MMOL/L (ref 3.5–5.1)
POTASSIUM SERPL-SCNC: 4 MMOL/L (ref 3.5–5.1)
POTASSIUM SERPL-SCNC: 4.1 MMOL/L (ref 3.5–5.1)
POTASSIUM SERPL-SCNC: 4.2 MMOL/L (ref 3.5–5.1)
POTASSIUM SERPL-SCNC: 4.2 MMOL/L (ref 3.5–5.1)
POTASSIUM SERPL-SCNC: 4.3 MMOL/L (ref 3.5–5.1)
POTASSIUM SERPL-SCNC: 4.3 MMOL/L (ref 3.5–5.1)
POTASSIUM SERPL-SCNC: 4.5 MMOL/L (ref 3.5–5.1)
POTASSIUM SERPL-SCNC: 4.6 MMOL/L (ref 3.5–5.1)
POTASSIUM SERPL-SCNC: 4.8 MMOL/L (ref 3.5–5.1)
POTASSIUM SERPL-SCNC: 4.9 MMOL/L (ref 3.5–5.1)
POTASSIUM SERPL-SCNC: 4.9 MMOL/L (ref 3.5–5.1)
POTASSIUM SERPL-SCNC: 5 MMOL/L (ref 3.5–5.1)
POTASSIUM SERPL-SCNC: 5.3 MMOL/L (ref 3.5–5.1)
POTASSIUM SERPL-SCNC: 5.4 MMOL/L (ref 3.5–5.1)
POTASSIUM SERPL-SCNC: 5.4 MMOL/L (ref 3.5–5.1)
PRO-BNP: 2462 PG/ML
PRO-BNP: 2941 PG/ML
PRO-BNP: 3506 PG/ML
PRO-BNP: 5200 PG/ML
PRO-BNP: 5470 PG/ML
PRO-BNP: 5653 PG/ML
PROCALCITONIN: 0.49
PROCALCITONIN: 0.53
PROCALCITONIN: 0.7
PROCALCITONIN: 0.92
PROCALCITONIN: 1.1
PROCALCITONIN: 1.22
PROCALCITONIN: 1.39
PROCALCITONIN: 1.42
PROCALCITONIN: 2.04
PROT/CREAT RATIO, UR: 0.5
PROT/CREAT RATIO, UR: 0.6
PROT/CREAT RATIO, UR: 1.9
PROTEIN UA: 100 MG/DL
PROTEIN UA: 30 MG/DL
PROTEIN UA: NEGATIVE MG/DL
PROTHROMBIN TIME: 19.8 SECONDS (ref 11.7–14.5)
PROTHROMBIN TIME: 19.9 SECONDS (ref 11.7–14.5)
PROTHROMBIN TIME: 19.9 SECONDS (ref 11.7–14.5)
PROTHROMBIN TIME: 21.9 SECONDS (ref 11.7–14.5)
PROTHROMBIN TIME: 22.9 SECONDS (ref 11.7–14.5)
PROTHROMBIN TIME: 66.3 SECONDS (ref 11.7–14.5)
RAPID INFLUENZA  B AGN: NEGATIVE
RAPID INFLUENZA A AGN: NEGATIVE
RBC # BLD: 2.4 M/CU MM (ref 4.2–5.4)
RBC # BLD: 2.47 M/CU MM (ref 4.2–5.4)
RBC # BLD: 2.58 M/CU MM (ref 4.2–5.4)
RBC # BLD: 2.71 M/CU MM (ref 4.2–5.4)
RBC # BLD: 2.83 M/CU MM (ref 4.2–5.4)
RBC # BLD: 2.93 M/CU MM (ref 4.2–5.4)
RBC # BLD: 2.96 M/CU MM (ref 4.2–5.4)
RBC # BLD: 3.06 M/CU MM (ref 4.2–5.4)
RBC # BLD: 3.12 M/CU MM (ref 4.2–5.4)
RBC # BLD: 3.25 M/CU MM (ref 4.2–5.4)
RBC # BLD: 3.26 M/CU MM (ref 4.2–5.4)
RBC # BLD: 3.31 M/CU MM (ref 4.2–5.4)
RBC # BLD: 3.35 M/CU MM (ref 4.2–5.4)
RBC # BLD: 3.46 M/CU MM (ref 4.2–5.4)
RBC # BLD: 3.57 M/CU MM (ref 4.2–5.4)
RBC # BLD: 3.57 M/CU MM (ref 4.2–5.4)
RBC # BLD: 3.68 M/CU MM (ref 4.2–5.4)
RBC # BLD: 3.73 M/CU MM (ref 4.2–5.4)
RBC # BLD: 3.85 M/CU MM (ref 4.2–5.4)
RBC # BLD: 3.98 M/CU MM (ref 4.2–5.4)
RBC # BLD: 4.05 M/CU MM (ref 4.2–5.4)
RBC # BLD: 4.15 M/CU MM (ref 4.2–5.4)
RBC # BLD: 4.54 M/CU MM (ref 4.2–5.4)
RBC URINE: 1 /HPF (ref 0–6)
RBC URINE: 1 /HPF (ref 0–6)
RBC URINE: 14 /HPF (ref 0–6)
RBC URINE: 159 /HPF (ref 0–6)
RBC URINE: 3 /HPF (ref 0–6)
RBC URINE: 5 /HPF (ref 0–6)
REASON FOR REJECTION: NORMAL
REJECTED TEST: NORMAL
REJECTED TEST: NORMAL
RHINOVIRUS ENTEROVIRUS PCR: NOT DETECTED
RSV PCR: NOT DETECTED
SEGMENTED NEUTROPHILS ABSOLUTE COUNT: 10.1 K/CU MM
SEGMENTED NEUTROPHILS ABSOLUTE COUNT: 10.2 K/CU MM
SEGMENTED NEUTROPHILS ABSOLUTE COUNT: 11.2 K/CU MM
SEGMENTED NEUTROPHILS ABSOLUTE COUNT: 11.6 K/CU MM
SEGMENTED NEUTROPHILS ABSOLUTE COUNT: 12.1 K/CU MM
SEGMENTED NEUTROPHILS ABSOLUTE COUNT: 12.6 K/CU MM
SEGMENTED NEUTROPHILS ABSOLUTE COUNT: 16.9 K/CU MM
SEGMENTED NEUTROPHILS ABSOLUTE COUNT: 18.3 K/CU MM
SEGMENTED NEUTROPHILS ABSOLUTE COUNT: 21.1 K/CU MM
SEGMENTED NEUTROPHILS ABSOLUTE COUNT: 22.3 K/CU MM
SEGMENTED NEUTROPHILS ABSOLUTE COUNT: 22.9 K/CU MM
SEGMENTED NEUTROPHILS ABSOLUTE COUNT: 25.1 K/CU MM
SEGMENTED NEUTROPHILS ABSOLUTE COUNT: 7 K/CU MM
SEGMENTED NEUTROPHILS ABSOLUTE COUNT: 7.1 K/CU MM
SEGMENTED NEUTROPHILS ABSOLUTE COUNT: 7.6 K/CU MM
SEGMENTED NEUTROPHILS ABSOLUTE COUNT: 8.5 K/CU MM
SEGMENTED NEUTROPHILS ABSOLUTE COUNT: 9.2 K/CU MM
SEGMENTED NEUTROPHILS ABSOLUTE COUNT: 9.3 K/CU MM
SEGMENTED NEUTROPHILS ABSOLUTE COUNT: 9.5 K/CU MM
SEGMENTED NEUTROPHILS ABSOLUTE COUNT: 9.6 K/CU MM
SEGMENTED NEUTROPHILS ABSOLUTE COUNT: 9.6 K/CU MM
SEGMENTED NEUTROPHILS ABSOLUTE COUNT: 9.8 K/CU MM
SEGMENTED NEUTROPHILS RELATIVE PERCENT: 63 % (ref 36–66)
SEGMENTED NEUTROPHILS RELATIVE PERCENT: 65 % (ref 36–66)
SEGMENTED NEUTROPHILS RELATIVE PERCENT: 68.5 % (ref 36–66)
SEGMENTED NEUTROPHILS RELATIVE PERCENT: 68.6 % (ref 36–66)
SEGMENTED NEUTROPHILS RELATIVE PERCENT: 69.6 % (ref 36–66)
SEGMENTED NEUTROPHILS RELATIVE PERCENT: 75 % (ref 36–66)
SEGMENTED NEUTROPHILS RELATIVE PERCENT: 75.9 % (ref 36–66)
SEGMENTED NEUTROPHILS RELATIVE PERCENT: 76.5 % (ref 36–66)
SEGMENTED NEUTROPHILS RELATIVE PERCENT: 78.2 % (ref 36–66)
SEGMENTED NEUTROPHILS RELATIVE PERCENT: 79.6 % (ref 36–66)
SEGMENTED NEUTROPHILS RELATIVE PERCENT: 80 % (ref 36–66)
SEGMENTED NEUTROPHILS RELATIVE PERCENT: 81.5 % (ref 36–66)
SEGMENTED NEUTROPHILS RELATIVE PERCENT: 82 % (ref 36–66)
SEGMENTED NEUTROPHILS RELATIVE PERCENT: 82 % (ref 36–66)
SEGMENTED NEUTROPHILS RELATIVE PERCENT: 82.1 % (ref 36–66)
SEGMENTED NEUTROPHILS RELATIVE PERCENT: 82.6 % (ref 36–66)
SEGMENTED NEUTROPHILS RELATIVE PERCENT: 84 % (ref 36–66)
SEGMENTED NEUTROPHILS RELATIVE PERCENT: 84 % (ref 36–66)
SEGMENTED NEUTROPHILS RELATIVE PERCENT: 85.3 % (ref 36–66)
SEGMENTED NEUTROPHILS RELATIVE PERCENT: 86.8 % (ref 36–66)
SEGMENTED NEUTROPHILS RELATIVE PERCENT: 88.4 % (ref 36–66)
SEGMENTED NEUTROPHILS RELATIVE PERCENT: 90 % (ref 36–66)
SODIUM BLD-SCNC: 127 MMOL/L (ref 135–145)
SODIUM BLD-SCNC: 129 MMOL/L (ref 135–145)
SODIUM BLD-SCNC: 130 MMOL/L (ref 135–145)
SODIUM BLD-SCNC: 130 MMOL/L (ref 135–145)
SODIUM BLD-SCNC: 131 MMOL/L (ref 135–145)
SODIUM BLD-SCNC: 132 MMOL/L (ref 135–145)
SODIUM BLD-SCNC: 133 MMOL/L (ref 135–145)
SODIUM BLD-SCNC: 133 MMOL/L (ref 135–145)
SODIUM BLD-SCNC: 134 MMOL/L (ref 135–145)
SODIUM BLD-SCNC: 136 MMOL/L (ref 135–145)
SODIUM BLD-SCNC: 137 MMOL/L (ref 135–145)
SODIUM URINE: 10 MMOL/L (ref 35–167)
SODIUM URINE: 11 MMOL/L (ref 35–167)
SODIUM URINE: 24 MMOL/L (ref 35–167)
SODIUM URINE: 33 MMOL/L (ref 35–167)
SPECIFIC GRAVITY UA: 1.01 (ref 1–1.03)
SPECIFIC GRAVITY UA: 1.01 (ref 1–1.03)
SPECIFIC GRAVITY UA: 1.02 (ref 1–1.03)
SPECIFIC GRAVITY UA: 1.03 (ref 1–1.03)
SPECIMEN: ABNORMAL
SPECIMEN: ABNORMAL
SPECIMEN: NORMAL
SQUAMOUS EPITHELIAL: 1 /HPF
SQUAMOUS EPITHELIAL: 2 /HPF
SQUAMOUS EPITHELIAL: 4 /HPF
STATUS: NORMAL
STATUS: NORMAL
STREP PNEUMONIAE ANTIGEN: NORMAL
T4 FREE: 0.68 NG/DL (ref 0.9–1.8)
T4 FREE: 1.28 NG/DL (ref 0.9–1.8)
TARGET CELLS: ABNORMAL
TARGET CELLS: ABNORMAL
TEAR DROP CELLS: ABNORMAL
TEAR DROP CELLS: ABNORMAL
TOTAL COLONY COUNT: ABNORMAL
TOTAL IMMATURE NEUTOROPHIL: 0.05 K/CU MM
TOTAL IMMATURE NEUTOROPHIL: 0.05 K/CU MM
TOTAL IMMATURE NEUTOROPHIL: 0.06 K/CU MM
TOTAL IMMATURE NEUTOROPHIL: 0.08 K/CU MM
TOTAL IMMATURE NEUTOROPHIL: 0.1 K/CU MM
TOTAL IMMATURE NEUTOROPHIL: 0.14 K/CU MM
TOTAL IMMATURE NEUTOROPHIL: 0.23 K/CU MM
TOTAL IMMATURE NEUTOROPHIL: 0.31 K/CU MM
TOTAL IMMATURE NEUTOROPHIL: 0.31 K/CU MM
TOTAL IMMATURE NEUTOROPHIL: 0.34 K/CU MM
TOTAL IMMATURE NEUTOROPHIL: 0.55 K/CU MM
TOTAL IMMATURE NEUTOROPHIL: 0.85 K/CU MM
TOTAL IMMATURE NEUTOROPHIL: 0.98 K/CU MM
TOTAL IRON BINDING CAPACITY: 150 UG/DL (ref 250–450)
TOTAL IRON BINDING CAPACITY: <70 UG/DL (ref 250–450)
TOTAL NUCLEATED RBC: 0 K/CU MM
TOTAL NUCLEATED RBC: 0.1 K/CU MM
TOTAL NUCLEATED RBC: 0.1 K/CU MM
TOTAL PROTEIN: 5.6 GM/DL (ref 6.4–8.2)
TOTAL PROTEIN: 5.6 GM/DL (ref 6.4–8.2)
TOTAL PROTEIN: 5.7 GM/DL (ref 6.4–8.2)
TOTAL PROTEIN: 5.7 GM/DL (ref 6.4–8.2)
TOTAL PROTEIN: 5.9 GM/DL (ref 6.4–8.2)
TOTAL PROTEIN: 6 GM/DL (ref 6.4–8.2)
TOTAL PROTEIN: 6.1 GM/DL (ref 6.4–8.2)
TOTAL PROTEIN: 6.1 GM/DL (ref 6.4–8.2)
TOTAL PROTEIN: 6.5 GM/DL (ref 6.4–8.2)
TOTAL PROTEIN: 6.7 GM/DL (ref 6.4–8.2)
TOTAL PROTEIN: 6.7 GM/DL (ref 6.4–8.2)
TOTAL PROTEIN: 6.9 GM/DL (ref 6.4–8.2)
TOTAL PROTEIN: 7 GM/DL (ref 6.4–8.2)
TOTAL PROTEIN: 7 GM/DL (ref 6.4–8.2)
TOTAL PROTEIN: 7.1 GM/DL (ref 6.4–8.2)
TOTAL PROTEIN: 7.1 GM/DL (ref 6.4–8.2)
TOTAL PROTEIN: 7.4 GM/DL (ref 6.4–8.2)
TOTAL PROTEIN: 7.8 GM/DL (ref 6.4–8.2)
TOXIC GRANULATION: PRESENT
TRANSFUSION STATUS: NORMAL
TRANSFUSION STATUS: NORMAL
TRANSITIONAL EPITHELIAL: <1 /HPF
TRANSITIONAL EPITHELIAL: <1 /HPF
TRICHOMONAS: ABNORMAL /HPF
TRIGL SERPL-MCNC: 58 MG/DL
TROPONIN T: 0.02 NG/ML
TROPONIN T: <0.01 NG/ML
TSH HIGH SENSITIVITY: 0.18 UIU/ML (ref 0.27–4.2)
TSH HIGH SENSITIVITY: 0.23 UIU/ML (ref 0.27–4.2)
UNIT DIVISION: 0
UNIT DIVISION: 0
UNIT NUMBER: NORMAL
UNIT NUMBER: NORMAL
UNSATURATED IRON BINDING CAPACITY: 110 UG/DL (ref 110–370)
UNSATURATED IRON BINDING CAPACITY: <17 UG/DL (ref 110–370)
URINE TOTAL PROTEIN: 181.7 MG/DL
URINE TOTAL PROTEIN: 20.3 MG/DL
URINE TOTAL PROTEIN: 70.2 MG/DL
UROBILINOGEN, URINE: 1 MG/DL (ref 0.2–1)
UROBILINOGEN, URINE: 2 MG/DL (ref 0.2–1)
UROBILINOGEN, URINE: NORMAL MG/DL (ref 0.2–1)
VANCOMYCIN TROUGH: 24.4 UG/ML (ref 10–20)
VITAMIN B-12: >2000 PG/ML (ref 211–911)
WBC # BLD: 10 K/CU MM (ref 4–10.5)
WBC # BLD: 10.5 K/CU MM (ref 4–10.5)
WBC # BLD: 11.1 K/CU MM (ref 4–10.5)
WBC # BLD: 11.4 K/CU MM (ref 4–10.5)
WBC # BLD: 12.4 K/CU MM (ref 4–10.5)
WBC # BLD: 13.4 K/CU MM (ref 4–10.5)
WBC # BLD: 13.6 K/CU MM (ref 4–10.5)
WBC # BLD: 13.6 K/CU MM (ref 4–10.5)
WBC # BLD: 13.7 K/CU MM (ref 4–10.5)
WBC # BLD: 14 K/CU MM (ref 4–10.5)
WBC # BLD: 14 K/CU MM (ref 4–10.5)
WBC # BLD: 15.6 K/CU MM (ref 4–10.5)
WBC # BLD: 15.8 K/CU MM (ref 4–10.5)
WBC # BLD: 18.7 K/CU MM (ref 4–10.5)
WBC # BLD: 19.8 K/CU MM (ref 4–10.5)
WBC # BLD: 20.3 K/CU MM (ref 4–10.5)
WBC # BLD: 20.3 K/CU MM (ref 4–10.5)
WBC # BLD: 25.2 K/CU MM (ref 4–10.5)
WBC # BLD: 26.4 K/CU MM (ref 4–10.5)
WBC # BLD: 27.2 K/CU MM (ref 4–10.5)
WBC # BLD: 30.6 K/CU MM (ref 4–10.5)
WBC # BLD: 8.9 K/CU MM (ref 4–10.5)
WBC # BLD: 8.9 K/CU MM (ref 4–10.5)
WBC UA: 2 /HPF (ref 0–5)
WBC UA: 23 /HPF (ref 0–5)
WBC UA: 24 /HPF (ref 0–5)
WBC UA: 3 /HPF (ref 0–5)
WBC UA: 4 /HPF (ref 0–5)
WBC UA: 8 /HPF (ref 0–5)
YEAST: ABNORMAL /HPF

## 2020-01-01 PROCEDURE — 87486 CHLMYD PNEUM DNA AMP PROBE: CPT

## 2020-01-01 PROCEDURE — 6370000000 HC RX 637 (ALT 250 FOR IP): Performed by: INTERNAL MEDICINE

## 2020-01-01 PROCEDURE — 2580000003 HC RX 258: Performed by: INTERNAL MEDICINE

## 2020-01-01 PROCEDURE — 83735 ASSAY OF MAGNESIUM: CPT

## 2020-01-01 PROCEDURE — 2500000003 HC RX 250 WO HCPCS: Performed by: INTERNAL MEDICINE

## 2020-01-01 PROCEDURE — 94640 AIRWAY INHALATION TREATMENT: CPT

## 2020-01-01 PROCEDURE — 6370000000 HC RX 637 (ALT 250 FOR IP): Performed by: NURSE PRACTITIONER

## 2020-01-01 PROCEDURE — 94664 DEMO&/EVAL PT USE INHALER: CPT

## 2020-01-01 PROCEDURE — 2700000000 HC OXYGEN THERAPY PER DAY

## 2020-01-01 PROCEDURE — 87150 DNA/RNA AMPLIFIED PROBE: CPT

## 2020-01-01 PROCEDURE — 2580000003 HC RX 258: Performed by: SURGERY

## 2020-01-01 PROCEDURE — P9017 PLASMA 1 DONOR FRZ W/IN 8 HR: HCPCS

## 2020-01-01 PROCEDURE — 85610 PROTHROMBIN TIME: CPT

## 2020-01-01 PROCEDURE — 87086 URINE CULTURE/COLONY COUNT: CPT

## 2020-01-01 PROCEDURE — 6370000000 HC RX 637 (ALT 250 FOR IP): Performed by: HOSPITALIST

## 2020-01-01 PROCEDURE — 80076 HEPATIC FUNCTION PANEL: CPT

## 2020-01-01 PROCEDURE — 82248 BILIRUBIN DIRECT: CPT

## 2020-01-01 PROCEDURE — 36430 TRANSFUSION BLD/BLD COMPNT: CPT

## 2020-01-01 PROCEDURE — 1200000000 HC SEMI PRIVATE

## 2020-01-01 PROCEDURE — 6360000002 HC RX W HCPCS: Performed by: FAMILY MEDICINE

## 2020-01-01 PROCEDURE — 85025 COMPLETE CBC W/AUTO DIFF WBC: CPT

## 2020-01-01 PROCEDURE — 84156 ASSAY OF PROTEIN URINE: CPT

## 2020-01-01 PROCEDURE — 85027 COMPLETE CBC AUTOMATED: CPT

## 2020-01-01 PROCEDURE — 6360000002 HC RX W HCPCS: Performed by: INTERNAL MEDICINE

## 2020-01-01 PROCEDURE — 86901 BLOOD TYPING SEROLOGIC RH(D): CPT

## 2020-01-01 PROCEDURE — 83880 ASSAY OF NATRIURETIC PEPTIDE: CPT

## 2020-01-01 PROCEDURE — 76937 US GUIDE VASCULAR ACCESS: CPT | Performed by: SURGERY

## 2020-01-01 PROCEDURE — 2060000000 HC ICU INTERMEDIATE R&B

## 2020-01-01 PROCEDURE — 87581 M.PNEUMON DNA AMP PROBE: CPT

## 2020-01-01 PROCEDURE — 87040 BLOOD CULTURE FOR BACTERIA: CPT

## 2020-01-01 PROCEDURE — 6370000000 HC RX 637 (ALT 250 FOR IP): Performed by: SURGERY

## 2020-01-01 PROCEDURE — 82962 GLUCOSE BLOOD TEST: CPT

## 2020-01-01 PROCEDURE — 2500000003 HC RX 250 WO HCPCS: Performed by: NURSE PRACTITIONER

## 2020-01-01 PROCEDURE — 85007 BL SMEAR W/DIFF WBC COUNT: CPT

## 2020-01-01 PROCEDURE — 2709999900 HC NON-CHARGEABLE SUPPLY: Performed by: SURGERY

## 2020-01-01 PROCEDURE — 6360000002 HC RX W HCPCS: Performed by: EMERGENCY MEDICINE

## 2020-01-01 PROCEDURE — 36592 COLLECT BLOOD FROM PICC: CPT

## 2020-01-01 PROCEDURE — 6360000002 HC RX W HCPCS: Performed by: HOSPITALIST

## 2020-01-01 PROCEDURE — 94761 N-INVAS EAR/PLS OXIMETRY MLT: CPT

## 2020-01-01 PROCEDURE — 2580000003 HC RX 258

## 2020-01-01 PROCEDURE — 74176 CT ABD & PELVIS W/O CONTRAST: CPT

## 2020-01-01 PROCEDURE — 86141 C-REACTIVE PROTEIN HS: CPT

## 2020-01-01 PROCEDURE — 85018 HEMOGLOBIN: CPT

## 2020-01-01 PROCEDURE — 99226 PR SBSQ OBSERVATION CARE/DAY 35 MINUTES: CPT | Performed by: INTERNAL MEDICINE

## 2020-01-01 PROCEDURE — 83690 ASSAY OF LIPASE: CPT

## 2020-01-01 PROCEDURE — 83550 IRON BINDING TEST: CPT

## 2020-01-01 PROCEDURE — 36415 COLL VENOUS BLD VENIPUNCTURE: CPT

## 2020-01-01 PROCEDURE — 6370000000 HC RX 637 (ALT 250 FOR IP): Performed by: PHYSICIAN ASSISTANT

## 2020-01-01 PROCEDURE — 6360000002 HC RX W HCPCS: Performed by: SURGERY

## 2020-01-01 PROCEDURE — 84145 PROCALCITONIN (PCT): CPT

## 2020-01-01 PROCEDURE — 84443 ASSAY THYROID STIM HORMONE: CPT

## 2020-01-01 PROCEDURE — 2580000003 HC RX 258: Performed by: HOSPITALIST

## 2020-01-01 PROCEDURE — 81001 URINALYSIS AUTO W/SCOPE: CPT

## 2020-01-01 PROCEDURE — 97163 PT EVAL HIGH COMPLEX 45 MIN: CPT

## 2020-01-01 PROCEDURE — 86927 PLASMA FRESH FROZEN: CPT

## 2020-01-01 PROCEDURE — 87449 NOS EACH ORGANISM AG IA: CPT

## 2020-01-01 PROCEDURE — 2500000003 HC RX 250 WO HCPCS: Performed by: PHYSICIAN ASSISTANT

## 2020-01-01 PROCEDURE — 36556 INSERT NON-TUNNEL CV CATH: CPT | Performed by: SURGERY

## 2020-01-01 PROCEDURE — 71045 X-RAY EXAM CHEST 1 VIEW: CPT

## 2020-01-01 PROCEDURE — 99232 SBSQ HOSP IP/OBS MODERATE 35: CPT | Performed by: INTERNAL MEDICINE

## 2020-01-01 PROCEDURE — 2780000010 HC IMPLANT OTHER: Performed by: SURGERY

## 2020-01-01 PROCEDURE — 99253 IP/OBS CNSLTJ NEW/EST LOW 45: CPT | Performed by: INTERNAL MEDICINE

## 2020-01-01 PROCEDURE — 83615 LACTATE (LD) (LDH) ENZYME: CPT

## 2020-01-01 PROCEDURE — 80053 COMPREHEN METABOLIC PANEL: CPT

## 2020-01-01 PROCEDURE — APPSS60 APP SPLIT SHARED TIME 46-60 MINUTES: Performed by: NURSE PRACTITIONER

## 2020-01-01 PROCEDURE — 0WJJ4ZZ INSPECTION OF PELVIC CAVITY, PERCUTANEOUS ENDOSCOPIC APPROACH: ICD-10-PCS | Performed by: SURGERY

## 2020-01-01 PROCEDURE — 87798 DETECT AGENT NOS DNA AMP: CPT

## 2020-01-01 PROCEDURE — 99285 EMERGENCY DEPT VISIT HI MDM: CPT

## 2020-01-01 PROCEDURE — 85379 FIBRIN DEGRADATION QUANT: CPT

## 2020-01-01 PROCEDURE — 87633 RESP VIRUS 12-25 TARGETS: CPT

## 2020-01-01 PROCEDURE — 99254 IP/OBS CNSLTJ NEW/EST MOD 60: CPT | Performed by: INTERNAL MEDICINE

## 2020-01-01 PROCEDURE — 99232 SBSQ HOSP IP/OBS MODERATE 35: CPT | Performed by: SURGERY

## 2020-01-01 PROCEDURE — 85730 THROMBOPLASTIN TIME PARTIAL: CPT

## 2020-01-01 PROCEDURE — 6360000002 HC RX W HCPCS: Performed by: NURSE PRACTITIONER

## 2020-01-01 PROCEDURE — 7100000000 HC PACU RECOVERY - FIRST 15 MIN

## 2020-01-01 PROCEDURE — 82140 ASSAY OF AMMONIA: CPT

## 2020-01-01 PROCEDURE — 80048 BASIC METABOLIC PNL TOTAL CA: CPT

## 2020-01-01 PROCEDURE — 84100 ASSAY OF PHOSPHORUS: CPT

## 2020-01-01 PROCEDURE — 97110 THERAPEUTIC EXERCISES: CPT

## 2020-01-01 PROCEDURE — 80202 ASSAY OF VANCOMYCIN: CPT

## 2020-01-01 PROCEDURE — 83605 ASSAY OF LACTIC ACID: CPT

## 2020-01-01 PROCEDURE — 99291 CRITICAL CARE FIRST HOUR: CPT

## 2020-01-01 PROCEDURE — 2580000003 HC RX 258: Performed by: NURSE PRACTITIONER

## 2020-01-01 PROCEDURE — 99232 SBSQ HOSP IP/OBS MODERATE 35: CPT | Performed by: NURSE PRACTITIONER

## 2020-01-01 PROCEDURE — 0F9440Z DRAINAGE OF GALLBLADDER WITH DRAINAGE DEVICE, PERCUTANEOUS ENDOSCOPIC APPROACH: ICD-10-PCS | Performed by: SURGERY

## 2020-01-01 PROCEDURE — 99024 POSTOP FOLLOW-UP VISIT: CPT | Performed by: SURGERY

## 2020-01-01 PROCEDURE — C9113 INJ PANTOPRAZOLE SODIUM, VIA: HCPCS | Performed by: SPECIALIST

## 2020-01-01 PROCEDURE — 97530 THERAPEUTIC ACTIVITIES: CPT

## 2020-01-01 PROCEDURE — 86900 BLOOD TYPING SEROLOGIC ABO: CPT

## 2020-01-01 PROCEDURE — 2100000000 HC CCU R&B

## 2020-01-01 PROCEDURE — 02HV33Z INSERTION OF INFUSION DEVICE INTO SUPERIOR VENA CAVA, PERCUTANEOUS APPROACH: ICD-10-PCS | Performed by: HOSPITALIST

## 2020-01-01 PROCEDURE — 2500000003 HC RX 250 WO HCPCS

## 2020-01-01 PROCEDURE — 93005 ELECTROCARDIOGRAM TRACING: CPT | Performed by: PHYSICIAN ASSISTANT

## 2020-01-01 PROCEDURE — 36600 WITHDRAWAL OF ARTERIAL BLOOD: CPT

## 2020-01-01 PROCEDURE — 93010 ELECTROCARDIOGRAM REPORT: CPT | Performed by: INTERNAL MEDICINE

## 2020-01-01 PROCEDURE — 2580000003 HC RX 258: Performed by: PHYSICIAN ASSISTANT

## 2020-01-01 PROCEDURE — 83540 ASSAY OF IRON: CPT

## 2020-01-01 PROCEDURE — 99233 SBSQ HOSP IP/OBS HIGH 50: CPT | Performed by: INTERNAL MEDICINE

## 2020-01-01 PROCEDURE — 2000000000 HC ICU R&B

## 2020-01-01 PROCEDURE — 51702 INSERT TEMP BLADDER CATH: CPT

## 2020-01-01 PROCEDURE — 87899 AGENT NOS ASSAY W/OPTIC: CPT

## 2020-01-01 PROCEDURE — 84484 ASSAY OF TROPONIN QUANT: CPT

## 2020-01-01 PROCEDURE — 71250 CT THORAX DX C-: CPT

## 2020-01-01 PROCEDURE — 72125 CT NECK SPINE W/O DYE: CPT

## 2020-01-01 PROCEDURE — 82607 VITAMIN B-12: CPT

## 2020-01-01 PROCEDURE — 3600000004 HC SURGERY LEVEL 4 BASE: Performed by: SURGERY

## 2020-01-01 PROCEDURE — 6360000002 HC RX W HCPCS: Performed by: SPECIALIST

## 2020-01-01 PROCEDURE — 6360000002 HC RX W HCPCS: Performed by: NURSE ANESTHETIST, CERTIFIED REGISTERED

## 2020-01-01 PROCEDURE — 71046 X-RAY EXAM CHEST 2 VIEWS: CPT

## 2020-01-01 PROCEDURE — 96365 THER/PROPH/DIAG IV INF INIT: CPT

## 2020-01-01 PROCEDURE — 3700000000 HC ANESTHESIA ATTENDED CARE: Performed by: SURGERY

## 2020-01-01 PROCEDURE — 70450 CT HEAD/BRAIN W/O DYE: CPT

## 2020-01-01 PROCEDURE — 85014 HEMATOCRIT: CPT

## 2020-01-01 PROCEDURE — C1751 CATH, INF, PER/CENT/MIDLINE: HCPCS

## 2020-01-01 PROCEDURE — 97166 OT EVAL MOD COMPLEX 45 MIN: CPT

## 2020-01-01 PROCEDURE — 94660 CPAP INITIATION&MGMT: CPT

## 2020-01-01 PROCEDURE — 87804 INFLUENZA ASSAY W/OPTIC: CPT

## 2020-01-01 PROCEDURE — 84300 ASSAY OF URINE SODIUM: CPT

## 2020-01-01 PROCEDURE — P9016 RBC LEUKOCYTES REDUCED: HCPCS

## 2020-01-01 PROCEDURE — 2140000000 HC CCU INTERMEDIATE R&B

## 2020-01-01 PROCEDURE — 2500000003 HC RX 250 WO HCPCS: Performed by: NURSE ANESTHETIST, CERTIFIED REGISTERED

## 2020-01-01 PROCEDURE — 93306 TTE W/DOPPLER COMPLETE: CPT

## 2020-01-01 PROCEDURE — 2720000010 HC SURG SUPPLY STERILE: Performed by: SURGERY

## 2020-01-01 PROCEDURE — 80061 LIPID PANEL: CPT

## 2020-01-01 PROCEDURE — 6370000000 HC RX 637 (ALT 250 FOR IP): Performed by: FAMILY MEDICINE

## 2020-01-01 PROCEDURE — 85384 FIBRINOGEN ACTIVITY: CPT

## 2020-01-01 PROCEDURE — 36569 INSJ PICC 5 YR+ W/O IMAGING: CPT

## 2020-01-01 PROCEDURE — 99253 IP/OBS CNSLTJ NEW/EST LOW 45: CPT | Performed by: SURGERY

## 2020-01-01 PROCEDURE — 93005 ELECTROCARDIOGRAM TRACING: CPT | Performed by: EMERGENCY MEDICINE

## 2020-01-01 PROCEDURE — 2500000003 HC RX 250 WO HCPCS: Performed by: SURGERY

## 2020-01-01 PROCEDURE — 76705 ECHO EXAM OF ABDOMEN: CPT

## 2020-01-01 PROCEDURE — 82150 ASSAY OF AMYLASE: CPT

## 2020-01-01 PROCEDURE — 6360000002 HC RX W HCPCS: Performed by: PHYSICIAN ASSISTANT

## 2020-01-01 PROCEDURE — 83935 ASSAY OF URINE OSMOLALITY: CPT

## 2020-01-01 PROCEDURE — P9045 ALBUMIN (HUMAN), 5%, 250 ML: HCPCS | Performed by: FAMILY MEDICINE

## 2020-01-01 PROCEDURE — U0002 COVID-19 LAB TEST NON-CDC: HCPCS

## 2020-01-01 PROCEDURE — 2580000003 HC RX 258: Performed by: NURSE ANESTHETIST, CERTIFIED REGISTERED

## 2020-01-01 PROCEDURE — 82570 ASSAY OF URINE CREATININE: CPT

## 2020-01-01 PROCEDURE — 97535 SELF CARE MNGMENT TRAINING: CPT

## 2020-01-01 PROCEDURE — P9047 ALBUMIN (HUMAN), 25%, 50ML: HCPCS | Performed by: PHYSICIAN ASSISTANT

## 2020-01-01 PROCEDURE — 84439 ASSAY OF FREE THYROXINE: CPT

## 2020-01-01 PROCEDURE — 6360000004 HC RX CONTRAST MEDICATION: Performed by: PHYSICIAN ASSISTANT

## 2020-01-01 PROCEDURE — 86850 RBC ANTIBODY SCREEN: CPT

## 2020-01-01 PROCEDURE — 97167 OT EVAL HIGH COMPLEX 60 MIN: CPT

## 2020-01-01 PROCEDURE — 82803 BLOOD GASES ANY COMBINATION: CPT

## 2020-01-01 PROCEDURE — 76937 US GUIDE VASCULAR ACCESS: CPT

## 2020-01-01 PROCEDURE — 82040 ASSAY OF SERUM ALBUMIN: CPT

## 2020-01-01 PROCEDURE — 99213 OFFICE O/P EST LOW 20 MIN: CPT

## 2020-01-01 PROCEDURE — 78226 HEPATOBILIARY SYSTEM IMAGING: CPT

## 2020-01-01 PROCEDURE — 02HV33Z INSERTION OF INFUSION DEVICE INTO SUPERIOR VENA CAVA, PERCUTANEOUS APPROACH: ICD-10-PCS | Performed by: SURGERY

## 2020-01-01 PROCEDURE — 83036 HEMOGLOBIN GLYCOSYLATED A1C: CPT

## 2020-01-01 PROCEDURE — 97116 GAIT TRAINING THERAPY: CPT

## 2020-01-01 PROCEDURE — 3700000001 HC ADD 15 MINUTES (ANESTHESIA): Performed by: SURGERY

## 2020-01-01 PROCEDURE — 2500000003 HC RX 250 WO HCPCS: Performed by: HOSPITALIST

## 2020-01-01 PROCEDURE — 3600000014 HC SURGERY LEVEL 4 ADDTL 15MIN: Performed by: SURGERY

## 2020-01-01 PROCEDURE — 82728 ASSAY OF FERRITIN: CPT

## 2020-01-01 PROCEDURE — 83721 ASSAY OF BLOOD LIPOPROTEIN: CPT

## 2020-01-01 PROCEDURE — 82565 ASSAY OF CREATININE: CPT

## 2020-01-01 PROCEDURE — 3430000000 HC RX DIAGNOSTIC RADIOPHARMACEUTICAL: Performed by: SPECIALIST

## 2020-01-01 PROCEDURE — 82247 BILIRUBIN TOTAL: CPT

## 2020-01-01 PROCEDURE — 97162 PT EVAL MOD COMPLEX 30 MIN: CPT

## 2020-01-01 PROCEDURE — 2580000003 HC RX 258: Performed by: ANESTHESIOLOGY

## 2020-01-01 PROCEDURE — 99291 CRITICAL CARE FIRST HOUR: CPT | Performed by: INTERNAL MEDICINE

## 2020-01-01 PROCEDURE — 2580000003 HC RX 258: Performed by: FAMILY MEDICINE

## 2020-01-01 PROCEDURE — 86922 COMPATIBILITY TEST ANTIGLOB: CPT

## 2020-01-01 PROCEDURE — 6370000000 HC RX 637 (ALT 250 FOR IP): Performed by: EMERGENCY MEDICINE

## 2020-01-01 PROCEDURE — 6360000002 HC RX W HCPCS

## 2020-01-01 PROCEDURE — 99253 IP/OBS CNSLTJ NEW/EST LOW 45: CPT | Performed by: OBSTETRICS & GYNECOLOGY

## 2020-01-01 PROCEDURE — 87186 SC STD MICRODIL/AGAR DIL: CPT

## 2020-01-01 PROCEDURE — APPSS45 APP SPLIT SHARED TIME 31-45 MINUTES: Performed by: NURSE PRACTITIONER

## 2020-01-01 PROCEDURE — A9537 TC99M MEBROFENIN: HCPCS | Performed by: SPECIALIST

## 2020-01-01 PROCEDURE — 6370000000 HC RX 637 (ALT 250 FOR IP)

## 2020-01-01 PROCEDURE — 96374 THER/PROPH/DIAG INJ IV PUSH: CPT

## 2020-01-01 PROCEDURE — 74177 CT ABD & PELVIS W/CONTRAST: CPT

## 2020-01-01 PROCEDURE — 2580000003 HC RX 258: Performed by: EMERGENCY MEDICINE

## 2020-01-01 PROCEDURE — 82805 BLOOD GASES W/O2 SATURATION: CPT

## 2020-01-01 PROCEDURE — 5A12012 PERFORMANCE OF CARDIAC OUTPUT, SINGLE, MANUAL: ICD-10-PCS | Performed by: INTERNAL MEDICINE

## 2020-01-01 PROCEDURE — 02HV33Z INSERTION OF INFUSION DEVICE INTO SUPERIOR VENA CAVA, PERCUTANEOUS APPROACH: ICD-10-PCS | Performed by: INTERNAL MEDICINE

## 2020-01-01 PROCEDURE — 47490 INCISION OF GALLBLADDER: CPT | Performed by: SURGERY

## 2020-01-01 PROCEDURE — 82746 ASSAY OF FOLIC ACID SERUM: CPT

## 2020-01-01 DEVICE — AGENT HEMOSTATIC SURGIFLOW MATRIX KIT W/THROMBIN: Type: IMPLANTABLE DEVICE | Status: FUNCTIONAL

## 2020-01-01 RX ORDER — ONDANSETRON 2 MG/ML
INJECTION INTRAMUSCULAR; INTRAVENOUS PRN
Status: DISCONTINUED | OUTPATIENT
Start: 2020-01-01 | End: 2020-01-01 | Stop reason: SDUPTHER

## 2020-01-01 RX ORDER — 0.9 % SODIUM CHLORIDE 0.9 %
1000 INTRAVENOUS SOLUTION INTRAVENOUS ONCE
Status: DISCONTINUED | OUTPATIENT
Start: 2020-01-01 | End: 2020-01-01 | Stop reason: HOSPADM

## 2020-01-01 RX ORDER — POLYETHYLENE GLYCOL 3350 17 G/17G
17 POWDER, FOR SOLUTION ORAL DAILY PRN
Status: DISCONTINUED | OUTPATIENT
Start: 2020-01-01 | End: 2020-05-05 | Stop reason: HOSPADM

## 2020-01-01 RX ORDER — GLIMEPIRIDE 2 MG/1
4 TABLET ORAL
Status: DISCONTINUED | OUTPATIENT
Start: 2020-01-01 | End: 2020-01-01 | Stop reason: HOSPADM

## 2020-01-01 RX ORDER — HYDROXYZINE HYDROCHLORIDE 25 MG/1
100 TABLET, FILM COATED ORAL NIGHTLY
Status: DISCONTINUED | OUTPATIENT
Start: 2020-01-01 | End: 2020-01-01 | Stop reason: HOSPADM

## 2020-01-01 RX ORDER — LEVOTHYROXINE SODIUM 0.1 MG/1
200 TABLET ORAL DAILY
Status: DISCONTINUED | OUTPATIENT
Start: 2020-01-01 | End: 2020-01-01 | Stop reason: HOSPADM

## 2020-01-01 RX ORDER — PANTOPRAZOLE SODIUM 40 MG/10ML
40 INJECTION, POWDER, LYOPHILIZED, FOR SOLUTION INTRAVENOUS DAILY
Status: DISCONTINUED | OUTPATIENT
Start: 2020-01-01 | End: 2020-05-05 | Stop reason: HOSPADM

## 2020-01-01 RX ORDER — ONDANSETRON 2 MG/ML
4 INJECTION INTRAMUSCULAR; INTRAVENOUS EVERY 6 HOURS PRN
Status: DISCONTINUED | OUTPATIENT
Start: 2020-01-01 | End: 2020-01-01 | Stop reason: HOSPADM

## 2020-01-01 RX ORDER — SIMVASTATIN 10 MG
20 TABLET ORAL NIGHTLY
Status: DISCONTINUED | OUTPATIENT
Start: 2020-01-01 | End: 2020-01-01 | Stop reason: HOSPADM

## 2020-01-01 RX ORDER — ACETAMINOPHEN 650 MG/1
650 SUPPOSITORY RECTAL EVERY 6 HOURS PRN
Status: DISCONTINUED | OUTPATIENT
Start: 2020-01-01 | End: 2020-01-01

## 2020-01-01 RX ORDER — ACETAMINOPHEN 325 MG/1
650 TABLET ORAL EVERY 6 HOURS PRN
Status: CANCELLED | OUTPATIENT
Start: 2020-01-01

## 2020-01-01 RX ORDER — SODIUM CHLORIDE 0.9 % (FLUSH) 0.9 %
10 SYRINGE (ML) INJECTION PRN
Status: CANCELLED | OUTPATIENT
Start: 2020-01-01

## 2020-01-01 RX ORDER — LIDOCAINE HYDROCHLORIDE 10 MG/ML
5 INJECTION, SOLUTION EPIDURAL; INFILTRATION; INTRACAUDAL; PERINEURAL ONCE
Status: COMPLETED | OUTPATIENT
Start: 2020-01-01 | End: 2020-01-01

## 2020-01-01 RX ORDER — IPRATROPIUM BROMIDE AND ALBUTEROL SULFATE 2.5; .5 MG/3ML; MG/3ML
1 SOLUTION RESPIRATORY (INHALATION) EVERY 6 HOURS
Status: DISCONTINUED | OUTPATIENT
Start: 2020-01-01 | End: 2020-01-01 | Stop reason: HOSPADM

## 2020-01-01 RX ORDER — POTASSIUM CHLORIDE 20 MEQ/1
40 TABLET, EXTENDED RELEASE ORAL ONCE
Status: COMPLETED | OUTPATIENT
Start: 2020-01-01 | End: 2020-01-01

## 2020-01-01 RX ORDER — SODIUM CHLORIDE 0.9 % (FLUSH) 0.9 %
10 SYRINGE (ML) INJECTION EVERY 12 HOURS SCHEDULED
Status: CANCELLED | OUTPATIENT
Start: 2020-01-01

## 2020-01-01 RX ORDER — ZOLPIDEM TARTRATE 5 MG/1
10 TABLET ORAL NIGHTLY
Status: DISCONTINUED | OUTPATIENT
Start: 2020-01-01 | End: 2020-01-01 | Stop reason: HOSPADM

## 2020-01-01 RX ORDER — PROPOFOL 10 MG/ML
INJECTION, EMULSION INTRAVENOUS PRN
Status: DISCONTINUED | OUTPATIENT
Start: 2020-01-01 | End: 2020-01-01 | Stop reason: SDUPTHER

## 2020-01-01 RX ORDER — SODIUM CHLORIDE 9 MG/ML
INJECTION, SOLUTION INTRAVENOUS CONTINUOUS
Status: DISCONTINUED | OUTPATIENT
Start: 2020-01-01 | End: 2020-01-01

## 2020-01-01 RX ORDER — BUMETANIDE 0.25 MG/ML
0.5 INJECTION, SOLUTION INTRAMUSCULAR; INTRAVENOUS 3 TIMES DAILY
Status: DISCONTINUED | OUTPATIENT
Start: 2020-01-01 | End: 2020-01-01

## 2020-01-01 RX ORDER — ALENDRONATE SODIUM 70 MG/1
TABLET ORAL
COMMUNITY
Start: 2020-01-01

## 2020-01-01 RX ORDER — ISOSORBIDE MONONITRATE 30 MG/1
30 TABLET, EXTENDED RELEASE ORAL NIGHTLY
Status: DISCONTINUED | OUTPATIENT
Start: 2020-01-01 | End: 2020-05-05 | Stop reason: HOSPADM

## 2020-01-01 RX ORDER — ALBUTEROL SULFATE 90 UG/1
2 AEROSOL, METERED RESPIRATORY (INHALATION) ONCE
Status: COMPLETED | OUTPATIENT
Start: 2020-01-01 | End: 2020-01-01

## 2020-01-01 RX ORDER — SODIUM CHLORIDE 0.9 % (FLUSH) 0.9 %
10 SYRINGE (ML) INJECTION PRN
Status: DISCONTINUED | OUTPATIENT
Start: 2020-01-01 | End: 2020-01-01 | Stop reason: SDUPTHER

## 2020-01-01 RX ORDER — CLONIDINE HYDROCHLORIDE 0.2 MG/1
0.2 TABLET ORAL 3 TIMES DAILY
Status: DISCONTINUED | OUTPATIENT
Start: 2020-01-01 | End: 2020-01-01

## 2020-01-01 RX ORDER — LEVOTHYROXINE SODIUM 0.1 MG/1
200 TABLET ORAL DAILY
Status: DISCONTINUED | OUTPATIENT
Start: 2020-01-01 | End: 2020-05-05 | Stop reason: HOSPADM

## 2020-01-01 RX ORDER — SODIUM CHLORIDE, SODIUM LACTATE, POTASSIUM CHLORIDE, CALCIUM CHLORIDE 600; 310; 30; 20 MG/100ML; MG/100ML; MG/100ML; MG/100ML
INJECTION, SOLUTION INTRAVENOUS CONTINUOUS PRN
Status: DISCONTINUED | OUTPATIENT
Start: 2020-01-01 | End: 2020-01-01 | Stop reason: SDUPTHER

## 2020-01-01 RX ORDER — DEXTROSE MONOHYDRATE 25 G/50ML
50 INJECTION, SOLUTION INTRAVENOUS ONCE
Status: COMPLETED | OUTPATIENT
Start: 2020-01-01 | End: 2020-01-01

## 2020-01-01 RX ORDER — EPINEPHRINE 0.1 MG/ML
SYRINGE (ML) INJECTION
Status: COMPLETED | OUTPATIENT
Start: 2020-01-01 | End: 2020-01-01

## 2020-01-01 RX ORDER — ATORVASTATIN CALCIUM 10 MG/1
10 TABLET, FILM COATED ORAL NIGHTLY
Status: DISCONTINUED | OUTPATIENT
Start: 2020-01-01 | End: 2020-05-05 | Stop reason: HOSPADM

## 2020-01-01 RX ORDER — LACTULOSE 10 G/15ML
10 SOLUTION ORAL DAILY
Status: DISCONTINUED | OUTPATIENT
Start: 2020-01-01 | End: 2020-01-01 | Stop reason: HOSPADM

## 2020-01-01 RX ORDER — EXEMESTANE 25 MG/1
25 TABLET ORAL DAILY
Status: DISCONTINUED | OUTPATIENT
Start: 2020-01-01 | End: 2020-01-01 | Stop reason: HOSPADM

## 2020-01-01 RX ORDER — MAGNESIUM SULFATE IN WATER 40 MG/ML
2 INJECTION, SOLUTION INTRAVENOUS ONCE
Status: COMPLETED | OUTPATIENT
Start: 2020-01-01 | End: 2020-01-01

## 2020-01-01 RX ORDER — PANTOPRAZOLE SODIUM 40 MG/1
40 TABLET, DELAYED RELEASE ORAL
Status: DISCONTINUED | OUTPATIENT
Start: 2020-01-01 | End: 2020-01-01 | Stop reason: HOSPADM

## 2020-01-01 RX ORDER — TORSEMIDE 20 MG/1
20 TABLET ORAL DAILY
Status: CANCELLED | OUTPATIENT
Start: 2020-01-01

## 2020-01-01 RX ORDER — SODIUM CHLORIDE 0.9 % (FLUSH) 0.9 %
10 SYRINGE (ML) INJECTION EVERY 12 HOURS SCHEDULED
Status: DISCONTINUED | OUTPATIENT
Start: 2020-01-01 | End: 2020-01-01 | Stop reason: HOSPADM

## 2020-01-01 RX ORDER — 0.9 % SODIUM CHLORIDE 0.9 %
250 INTRAVENOUS SOLUTION INTRAVENOUS ONCE
Status: COMPLETED | OUTPATIENT
Start: 2020-01-01 | End: 2020-01-01

## 2020-01-01 RX ORDER — 0.9 % SODIUM CHLORIDE 0.9 %
20 INTRAVENOUS SOLUTION INTRAVENOUS ONCE
Status: DISCONTINUED | OUTPATIENT
Start: 2020-01-01 | End: 2020-01-01 | Stop reason: HOSPADM

## 2020-01-01 RX ORDER — 0.9 % SODIUM CHLORIDE 0.9 %
1000 INTRAVENOUS SOLUTION INTRAVENOUS ONCE
Status: COMPLETED | OUTPATIENT
Start: 2020-01-01 | End: 2020-01-01

## 2020-01-01 RX ORDER — CEFTRIAXONE 1 G/1
2 INJECTION, POWDER, FOR SOLUTION INTRAMUSCULAR; INTRAVENOUS EVERY 24 HOURS
Qty: 8 G | Refills: 0
Start: 2020-01-01 | End: 2020-01-01

## 2020-01-01 RX ORDER — EXENATIDE 2 MG/.65ML
INJECTION, SUSPENSION, EXTENDED RELEASE SUBCUTANEOUS
Status: ON HOLD | COMMUNITY
Start: 2020-01-01 | End: 2020-01-01 | Stop reason: HOSPADM

## 2020-01-01 RX ORDER — BUDESONIDE AND FORMOTEROL FUMARATE DIHYDRATE 160; 4.5 UG/1; UG/1
2 AEROSOL RESPIRATORY (INHALATION) 2 TIMES DAILY
Status: DISCONTINUED | OUTPATIENT
Start: 2020-01-01 | End: 2020-01-01 | Stop reason: HOSPADM

## 2020-01-01 RX ORDER — ATORVASTATIN CALCIUM 10 MG/1
10 TABLET, FILM COATED ORAL DAILY
Status: DISCONTINUED | OUTPATIENT
Start: 2020-01-01 | End: 2020-01-01 | Stop reason: HOSPADM

## 2020-01-01 RX ORDER — DEXTROSE MONOHYDRATE 25 G/50ML
12.5 INJECTION, SOLUTION INTRAVENOUS PRN
Status: DISCONTINUED | OUTPATIENT
Start: 2020-01-01 | End: 2020-01-01 | Stop reason: HOSPADM

## 2020-01-01 RX ORDER — FENTANYL CITRATE 50 UG/ML
25 INJECTION, SOLUTION INTRAMUSCULAR; INTRAVENOUS EVERY 5 MIN PRN
Status: DISCONTINUED | OUTPATIENT
Start: 2020-01-01 | End: 2020-01-01

## 2020-01-01 RX ORDER — CLONIDINE HYDROCHLORIDE 0.2 MG/1
0.2 TABLET ORAL 3 TIMES DAILY
Status: DISCONTINUED | OUTPATIENT
Start: 2020-01-01 | End: 2020-01-01 | Stop reason: HOSPADM

## 2020-01-01 RX ORDER — SODIUM CHLORIDE 0.9 % (FLUSH) 0.9 %
10 SYRINGE (ML) INJECTION EVERY 12 HOURS SCHEDULED
Status: DISCONTINUED | OUTPATIENT
Start: 2020-01-01 | End: 2020-05-05 | Stop reason: HOSPADM

## 2020-01-01 RX ORDER — DEXTROSE MONOHYDRATE 25 G/50ML
12.5 INJECTION, SOLUTION INTRAVENOUS PRN
Status: DISCONTINUED | OUTPATIENT
Start: 2020-01-01 | End: 2020-05-05 | Stop reason: HOSPADM

## 2020-01-01 RX ORDER — NICOTINE POLACRILEX 4 MG
15 LOZENGE BUCCAL PRN
Status: DISCONTINUED | OUTPATIENT
Start: 2020-01-01 | End: 2020-01-01 | Stop reason: HOSPADM

## 2020-01-01 RX ORDER — ROCURONIUM BROMIDE 10 MG/ML
INJECTION, SOLUTION INTRAVENOUS PRN
Status: DISCONTINUED | OUTPATIENT
Start: 2020-01-01 | End: 2020-01-01 | Stop reason: SDUPTHER

## 2020-01-01 RX ORDER — ISOSORBIDE MONONITRATE 30 MG/1
30 TABLET, EXTENDED RELEASE ORAL NIGHTLY
Status: DISCONTINUED | OUTPATIENT
Start: 2020-01-01 | End: 2020-01-01 | Stop reason: HOSPADM

## 2020-01-01 RX ORDER — FENTANYL CITRATE 50 UG/ML
INJECTION, SOLUTION INTRAMUSCULAR; INTRAVENOUS PRN
Status: DISCONTINUED | OUTPATIENT
Start: 2020-01-01 | End: 2020-01-01 | Stop reason: SDUPTHER

## 2020-01-01 RX ORDER — EXEMESTANE 25 MG/1
25 TABLET ORAL DAILY
Status: DISCONTINUED | OUTPATIENT
Start: 2020-01-01 | End: 2020-05-05 | Stop reason: HOSPADM

## 2020-01-01 RX ORDER — FLUCONAZOLE 100 MG/1
100 TABLET ORAL DAILY
Status: DISCONTINUED | OUTPATIENT
Start: 2020-01-01 | End: 2020-01-01 | Stop reason: HOSPADM

## 2020-01-01 RX ORDER — ACETAMINOPHEN 650 MG/1
650 SUPPOSITORY RECTAL EVERY 6 HOURS PRN
Status: DISCONTINUED | OUTPATIENT
Start: 2020-01-01 | End: 2020-01-01 | Stop reason: HOSPADM

## 2020-01-01 RX ORDER — ALBUTEROL SULFATE 90 UG/1
2 AEROSOL, METERED RESPIRATORY (INHALATION) ONCE
Status: DISCONTINUED | OUTPATIENT
Start: 2020-01-01 | End: 2020-01-01

## 2020-01-01 RX ORDER — DEXTROSE MONOHYDRATE 100 MG/ML
INJECTION, SOLUTION INTRAVENOUS CONTINUOUS
Status: DISCONTINUED | OUTPATIENT
Start: 2020-01-01 | End: 2020-01-01

## 2020-01-01 RX ORDER — ZOLPIDEM TARTRATE 5 MG/1
10 TABLET ORAL NIGHTLY PRN
Status: DISCONTINUED | OUTPATIENT
Start: 2020-01-01 | End: 2020-01-01 | Stop reason: HOSPADM

## 2020-01-01 RX ORDER — ACETAMINOPHEN 650 MG/1
650 SUPPOSITORY RECTAL EVERY 6 HOURS PRN
Status: DISCONTINUED | OUTPATIENT
Start: 2020-01-01 | End: 2020-05-05 | Stop reason: HOSPADM

## 2020-01-01 RX ORDER — SODIUM CHLORIDE 0.9 % (FLUSH) 0.9 %
10 SYRINGE (ML) INJECTION EVERY 12 HOURS SCHEDULED
Status: DISCONTINUED | OUTPATIENT
Start: 2020-01-01 | End: 2020-01-01

## 2020-01-01 RX ORDER — OXYCODONE HYDROCHLORIDE 5 MG/1
5 TABLET ORAL EVERY 4 HOURS PRN
Status: DISCONTINUED | OUTPATIENT
Start: 2020-01-01 | End: 2020-01-01 | Stop reason: HOSPADM

## 2020-01-01 RX ORDER — POLYETHYLENE GLYCOL 3350 17 G/17G
17 POWDER, FOR SOLUTION ORAL DAILY PRN
Status: DISCONTINUED | OUTPATIENT
Start: 2020-01-01 | End: 2020-01-01 | Stop reason: HOSPADM

## 2020-01-01 RX ORDER — CALCIUM CHLORIDE 100 MG/ML
INJECTION INTRAVENOUS; INTRAVENTRICULAR
Status: COMPLETED | OUTPATIENT
Start: 2020-01-01 | End: 2020-01-01

## 2020-01-01 RX ORDER — DEXTROSE MONOHYDRATE 50 MG/ML
100 INJECTION, SOLUTION INTRAVENOUS PRN
Status: DISCONTINUED | OUTPATIENT
Start: 2020-01-01 | End: 2020-01-01 | Stop reason: HOSPADM

## 2020-01-01 RX ORDER — INSULIN GLARGINE 100 [IU]/ML
5 INJECTION, SOLUTION SUBCUTANEOUS NIGHTLY
Status: DISCONTINUED | OUTPATIENT
Start: 2020-01-01 | End: 2020-01-01

## 2020-01-01 RX ORDER — SODIUM CHLORIDE 0.9 % (FLUSH) 0.9 %
10 SYRINGE (ML) INJECTION PRN
Status: DISCONTINUED | OUTPATIENT
Start: 2020-01-01 | End: 2020-01-01 | Stop reason: HOSPADM

## 2020-01-01 RX ORDER — ACETAMINOPHEN 325 MG/1
650 TABLET ORAL EVERY 6 HOURS PRN
Status: DISCONTINUED | OUTPATIENT
Start: 2020-01-01 | End: 2020-01-01 | Stop reason: HOSPADM

## 2020-01-01 RX ORDER — TORSEMIDE 20 MG/1
20 TABLET ORAL DAILY
Status: DISCONTINUED | OUTPATIENT
Start: 2020-01-01 | End: 2020-01-01

## 2020-01-01 RX ORDER — TORSEMIDE 20 MG/1
20 TABLET ORAL ONCE
Status: COMPLETED | OUTPATIENT
Start: 2020-01-01 | End: 2020-01-01

## 2020-01-01 RX ORDER — ALBUMIN (HUMAN) 12.5 G/50ML
25 SOLUTION INTRAVENOUS ONCE
Status: COMPLETED | OUTPATIENT
Start: 2020-01-01 | End: 2020-01-01

## 2020-01-01 RX ORDER — IPRATROPIUM BROMIDE AND ALBUTEROL SULFATE 2.5; .5 MG/3ML; MG/3ML
1 SOLUTION RESPIRATORY (INHALATION) EVERY 6 HOURS PRN
Status: DISCONTINUED | OUTPATIENT
Start: 2020-01-01 | End: 2020-05-05 | Stop reason: HOSPADM

## 2020-01-01 RX ORDER — ALBUTEROL SULFATE 90 UG/1
2 AEROSOL, METERED RESPIRATORY (INHALATION) 4 TIMES DAILY
Status: DISCONTINUED | OUTPATIENT
Start: 2020-01-01 | End: 2020-01-01

## 2020-01-01 RX ORDER — ONDANSETRON 2 MG/ML
4 INJECTION INTRAMUSCULAR; INTRAVENOUS
Status: DISCONTINUED | OUTPATIENT
Start: 2020-01-01 | End: 2020-01-01

## 2020-01-01 RX ORDER — MAGNESIUM SULFATE IN WATER 40 MG/ML
2 INJECTION, SOLUTION INTRAVENOUS ONCE
Status: DISCONTINUED | OUTPATIENT
Start: 2020-01-01 | End: 2020-01-01

## 2020-01-01 RX ORDER — MORPHINE SULFATE 4 MG/ML
4 INJECTION, SOLUTION INTRAMUSCULAR; INTRAVENOUS EVERY 4 HOURS PRN
Status: DISCONTINUED | OUTPATIENT
Start: 2020-01-01 | End: 2020-01-01 | Stop reason: HOSPADM

## 2020-01-01 RX ORDER — SPIRONOLACTONE 50 MG/1
100 TABLET, FILM COATED ORAL 2 TIMES DAILY
Status: DISCONTINUED | OUTPATIENT
Start: 2020-01-01 | End: 2020-01-01 | Stop reason: HOSPADM

## 2020-01-01 RX ORDER — DEXTROSE AND SODIUM CHLORIDE 5; .9 G/100ML; G/100ML
INJECTION, SOLUTION INTRAVENOUS CONTINUOUS
Status: DISCONTINUED | OUTPATIENT
Start: 2020-01-01 | End: 2020-01-01

## 2020-01-01 RX ORDER — DILTIAZEM HYDROCHLORIDE 60 MG/1
60 TABLET, FILM COATED ORAL EVERY 6 HOURS SCHEDULED
Status: DISCONTINUED | OUTPATIENT
Start: 2020-01-01 | End: 2020-01-01

## 2020-01-01 RX ORDER — SENNA AND DOCUSATE SODIUM 50; 8.6 MG/1; MG/1
2 TABLET, FILM COATED ORAL 2 TIMES DAILY
Status: DISCONTINUED | OUTPATIENT
Start: 2020-01-01 | End: 2020-01-01 | Stop reason: HOSPADM

## 2020-01-01 RX ORDER — TORSEMIDE 20 MG/1
20 TABLET ORAL 2 TIMES DAILY
Status: DISCONTINUED | OUTPATIENT
Start: 2020-01-01 | End: 2020-01-01

## 2020-01-01 RX ORDER — FERROUS SULFATE 325(65) MG
325 TABLET ORAL EVERY OTHER DAY
Status: DISCONTINUED | OUTPATIENT
Start: 2020-01-01 | End: 2020-01-01 | Stop reason: HOSPADM

## 2020-01-01 RX ORDER — ACETAMINOPHEN 325 MG/1
650 TABLET ORAL EVERY 6 HOURS PRN
Status: DISCONTINUED | OUTPATIENT
Start: 2020-01-01 | End: 2020-05-05 | Stop reason: HOSPADM

## 2020-01-01 RX ORDER — LACTULOSE 10 G/15ML
SOLUTION ORAL
COMMUNITY
Start: 2020-01-01

## 2020-01-01 RX ORDER — TORSEMIDE 20 MG/1
20 TABLET ORAL DAILY
Status: DISCONTINUED | OUTPATIENT
Start: 2020-01-01 | End: 2020-01-01 | Stop reason: HOSPADM

## 2020-01-01 RX ORDER — BUDESONIDE AND FORMOTEROL FUMARATE DIHYDRATE 160; 4.5 UG/1; UG/1
2 AEROSOL RESPIRATORY (INHALATION) 2 TIMES DAILY PRN
Status: DISCONTINUED | OUTPATIENT
Start: 2020-01-01 | End: 2020-01-01 | Stop reason: HOSPADM

## 2020-01-01 RX ORDER — MAGNESIUM SULFATE 4 G/50ML
4 INJECTION INTRAVENOUS ONCE
Status: COMPLETED | OUTPATIENT
Start: 2020-01-01 | End: 2020-01-01

## 2020-01-01 RX ORDER — INSULIN GLARGINE 100 [IU]/ML
25 INJECTION, SOLUTION SUBCUTANEOUS NIGHTLY
Status: DISCONTINUED | OUTPATIENT
Start: 2020-01-01 | End: 2020-01-01

## 2020-01-01 RX ORDER — OXYCODONE HYDROCHLORIDE AND ACETAMINOPHEN 5; 325 MG/1; MG/1
2 TABLET ORAL EVERY 4 HOURS PRN
Status: DISCONTINUED | OUTPATIENT
Start: 2020-01-01 | End: 2020-01-01 | Stop reason: HOSPADM

## 2020-01-01 RX ORDER — LIDOCAINE HYDROCHLORIDE 20 MG/ML
INJECTION, SOLUTION INTRAVENOUS PRN
Status: DISCONTINUED | OUTPATIENT
Start: 2020-01-01 | End: 2020-01-01 | Stop reason: SDUPTHER

## 2020-01-01 RX ORDER — IPRATROPIUM BROMIDE AND ALBUTEROL SULFATE 2.5; .5 MG/3ML; MG/3ML
1 SOLUTION RESPIRATORY (INHALATION) EVERY 6 HOURS
Status: DISCONTINUED | OUTPATIENT
Start: 2020-01-01 | End: 2020-01-01

## 2020-01-01 RX ORDER — MEROPENEM 1 G/1
INJECTION, POWDER, FOR SOLUTION INTRAVENOUS
Status: DISPENSED
Start: 2020-01-01 | End: 2020-01-01

## 2020-01-01 RX ORDER — BUDESONIDE AND FORMOTEROL FUMARATE DIHYDRATE 160; 4.5 UG/1; UG/1
2 AEROSOL RESPIRATORY (INHALATION) 2 TIMES DAILY
Status: DISCONTINUED | OUTPATIENT
Start: 2020-01-01 | End: 2020-01-01

## 2020-01-01 RX ORDER — GLIPIZIDE 5 MG/1
5 TABLET ORAL
Status: DISCONTINUED | OUTPATIENT
Start: 2020-01-01 | End: 2020-01-01

## 2020-01-01 RX ORDER — IPRATROPIUM BROMIDE AND ALBUTEROL SULFATE 2.5; .5 MG/3ML; MG/3ML
1 SOLUTION RESPIRATORY (INHALATION)
Status: DISCONTINUED | OUTPATIENT
Start: 2020-01-01 | End: 2020-01-01 | Stop reason: HOSPADM

## 2020-01-01 RX ORDER — METOPROLOL TARTRATE 5 MG/5ML
INJECTION INTRAVENOUS PRN
Status: DISCONTINUED | OUTPATIENT
Start: 2020-01-01 | End: 2020-01-01 | Stop reason: SDUPTHER

## 2020-01-01 RX ORDER — DILTIAZEM HYDROCHLORIDE 5 MG/ML
10 INJECTION INTRAVENOUS ONCE
Status: COMPLETED | OUTPATIENT
Start: 2020-01-01 | End: 2020-01-01

## 2020-01-01 RX ORDER — PROMETHAZINE HYDROCHLORIDE 25 MG/1
12.5 TABLET ORAL EVERY 6 HOURS PRN
Status: DISCONTINUED | OUTPATIENT
Start: 2020-01-01 | End: 2020-01-01 | Stop reason: HOSPADM

## 2020-01-01 RX ORDER — CLONIDINE HYDROCHLORIDE 0.1 MG/1
0.2 TABLET ORAL 3 TIMES DAILY
Status: DISCONTINUED | OUTPATIENT
Start: 2020-01-01 | End: 2020-01-01 | Stop reason: HOSPADM

## 2020-01-01 RX ORDER — SPIRONOLACTONE 50 MG/1
100 TABLET, FILM COATED ORAL 2 TIMES DAILY
Status: CANCELLED | OUTPATIENT
Start: 2020-01-01

## 2020-01-01 RX ORDER — SODIUM CHLORIDE, SODIUM LACTATE, POTASSIUM CHLORIDE, CALCIUM CHLORIDE 600; 310; 30; 20 MG/100ML; MG/100ML; MG/100ML; MG/100ML
INJECTION, SOLUTION INTRAVENOUS ONCE
Status: COMPLETED | OUTPATIENT
Start: 2020-01-01 | End: 2020-01-01

## 2020-01-01 RX ORDER — ALBUTEROL SULFATE 90 UG/1
2 AEROSOL, METERED RESPIRATORY (INHALATION) EVERY 6 HOURS PRN
Status: DISCONTINUED | OUTPATIENT
Start: 2020-01-01 | End: 2020-01-01

## 2020-01-01 RX ORDER — INSULIN GLARGINE 100 [IU]/ML
15 INJECTION, SOLUTION SUBCUTANEOUS NIGHTLY
Status: DISCONTINUED | OUTPATIENT
Start: 2020-01-01 | End: 2020-01-01 | Stop reason: HOSPADM

## 2020-01-01 RX ORDER — GABAPENTIN 300 MG/1
300 CAPSULE ORAL 3 TIMES DAILY PRN
Status: DISCONTINUED | OUTPATIENT
Start: 2020-01-01 | End: 2020-01-01 | Stop reason: HOSPADM

## 2020-01-01 RX ORDER — DEXTROSE 20 G/100ML
INJECTION, SOLUTION INTRAVENOUS CONTINUOUS
Status: DISCONTINUED | OUTPATIENT
Start: 2020-01-01 | End: 2020-01-01

## 2020-01-01 RX ORDER — SIMVASTATIN 20 MG
20 TABLET ORAL NIGHTLY
Status: DISCONTINUED | OUTPATIENT
Start: 2020-01-01 | End: 2020-01-01 | Stop reason: CLARIF

## 2020-01-01 RX ORDER — SODIUM CHLORIDE 0.9 % (FLUSH) 0.9 %
10 SYRINGE (ML) INJECTION PRN
Status: DISCONTINUED | OUTPATIENT
Start: 2020-01-01 | End: 2020-05-05 | Stop reason: HOSPADM

## 2020-01-01 RX ORDER — ATORVASTATIN CALCIUM 20 MG/1
20 TABLET, FILM COATED ORAL NIGHTLY
Status: DISCONTINUED | OUTPATIENT
Start: 2020-01-01 | End: 2020-01-01 | Stop reason: HOSPADM

## 2020-01-01 RX ORDER — PROMETHAZINE HYDROCHLORIDE 25 MG/1
12.5 TABLET ORAL EVERY 6 HOURS PRN
Status: DISCONTINUED | OUTPATIENT
Start: 2020-01-01 | End: 2020-05-05 | Stop reason: HOSPADM

## 2020-01-01 RX ORDER — SODIUM CHLORIDE 0.9 % (FLUSH) 0.9 %
10 SYRINGE (ML) INJECTION EVERY 12 HOURS SCHEDULED
Status: DISCONTINUED | OUTPATIENT
Start: 2020-01-01 | End: 2020-01-01 | Stop reason: SDUPTHER

## 2020-01-01 RX ORDER — VANCOMYCIN HYDROCHLORIDE 1 G/200ML
1000 INJECTION, SOLUTION INTRAVENOUS ONCE
Status: DISCONTINUED | OUTPATIENT
Start: 2020-01-01 | End: 2020-01-01 | Stop reason: DRUGHIGH

## 2020-01-01 RX ORDER — THROMBIN/CAL/CMC/GEL/DRESS,HEM 10 SQ CM
1 PADS, MEDICATED (EA) TOPICAL ONCE
Status: DISCONTINUED | OUTPATIENT
Start: 2020-01-01 | End: 2020-01-01

## 2020-01-01 RX ORDER — ALBUTEROL SULFATE 90 UG/1
2 AEROSOL, METERED RESPIRATORY (INHALATION) EVERY 6 HOURS PRN
Status: DISCONTINUED | OUTPATIENT
Start: 2020-01-01 | End: 2020-01-01 | Stop reason: HOSPADM

## 2020-01-01 RX ORDER — ONDANSETRON 2 MG/ML
4 INJECTION INTRAMUSCULAR; INTRAVENOUS EVERY 6 HOURS PRN
Status: DISCONTINUED | OUTPATIENT
Start: 2020-01-01 | End: 2020-05-05 | Stop reason: HOSPADM

## 2020-01-01 RX ORDER — SODIUM CHLORIDE 0.9 % (FLUSH) 0.9 %
10 SYRINGE (ML) INJECTION PRN
Status: DISCONTINUED | OUTPATIENT
Start: 2020-01-01 | End: 2020-01-01

## 2020-01-01 RX ORDER — ZOLPIDEM TARTRATE 5 MG/1
5 TABLET ORAL NIGHTLY PRN
Status: DISCONTINUED | OUTPATIENT
Start: 2020-01-01 | End: 2020-01-01 | Stop reason: HOSPADM

## 2020-01-01 RX ORDER — ACETAMINOPHEN 650 MG/1
650 SUPPOSITORY RECTAL EVERY 6 HOURS PRN
Status: CANCELLED | OUTPATIENT
Start: 2020-01-01

## 2020-01-01 RX ORDER — MIDODRINE HYDROCHLORIDE 5 MG/1
10 TABLET ORAL
Status: DISCONTINUED | OUTPATIENT
Start: 2020-01-01 | End: 2020-05-05 | Stop reason: HOSPADM

## 2020-01-01 RX ORDER — ZOLPIDEM TARTRATE 5 MG/1
5 TABLET ORAL NIGHTLY
Status: DISCONTINUED | OUTPATIENT
Start: 2020-01-01 | End: 2020-05-05 | Stop reason: HOSPADM

## 2020-01-01 RX ORDER — SUCCINYLCHOLINE/SOD CL,ISO/PF 100 MG/5ML
SYRINGE (ML) INTRAVENOUS PRN
Status: DISCONTINUED | OUTPATIENT
Start: 2020-01-01 | End: 2020-01-01 | Stop reason: SDUPTHER

## 2020-01-01 RX ORDER — MORPHINE SULFATE 4 MG/ML
4 INJECTION, SOLUTION INTRAMUSCULAR; INTRAVENOUS EVERY 30 MIN PRN
Status: DISCONTINUED | OUTPATIENT
Start: 2020-01-01 | End: 2020-01-01

## 2020-01-01 RX ORDER — 0.9 % SODIUM CHLORIDE 0.9 %
1000 INTRAVENOUS SOLUTION INTRAVENOUS ONCE
Status: DISCONTINUED | OUTPATIENT
Start: 2020-01-01 | End: 2020-01-01

## 2020-01-01 RX ORDER — BUDESONIDE AND FORMOTEROL FUMARATE DIHYDRATE 160; 4.5 UG/1; UG/1
2 AEROSOL RESPIRATORY (INHALATION) 2 TIMES DAILY PRN
Status: DISCONTINUED | OUTPATIENT
Start: 2020-01-01 | End: 2020-05-05 | Stop reason: HOSPADM

## 2020-01-01 RX ORDER — DEXTROSE MONOHYDRATE 100 MG/ML
INJECTION, SOLUTION INTRAVENOUS CONTINUOUS
Status: DISCONTINUED | OUTPATIENT
Start: 2020-01-01 | End: 2020-01-01 | Stop reason: DRUGHIGH

## 2020-01-01 RX ORDER — HYDROXYZINE 50 MG/1
100 TABLET, FILM COATED ORAL NIGHTLY
Status: DISCONTINUED | OUTPATIENT
Start: 2020-01-01 | End: 2020-01-01 | Stop reason: HOSPADM

## 2020-01-01 RX ORDER — HEPARIN SODIUM 5000 [USP'U]/ML
5000 INJECTION, SOLUTION INTRAVENOUS; SUBCUTANEOUS EVERY 8 HOURS SCHEDULED
Status: DISCONTINUED | OUTPATIENT
Start: 2020-01-01 | End: 2020-01-01

## 2020-01-01 RX ORDER — PROMETHAZINE HYDROCHLORIDE 12.5 MG/1
12.5 TABLET ORAL EVERY 6 HOURS PRN
Status: DISCONTINUED | OUTPATIENT
Start: 2020-01-01 | End: 2020-01-01 | Stop reason: HOSPADM

## 2020-01-01 RX ORDER — 0.9 % SODIUM CHLORIDE 0.9 %
20 INTRAVENOUS SOLUTION INTRAVENOUS ONCE
Status: COMPLETED | OUTPATIENT
Start: 2020-01-01 | End: 2020-01-01

## 2020-01-01 RX ORDER — DEXTROSE MONOHYDRATE 50 MG/ML
100 INJECTION, SOLUTION INTRAVENOUS PRN
Status: DISCONTINUED | OUTPATIENT
Start: 2020-01-01 | End: 2020-05-05 | Stop reason: HOSPADM

## 2020-01-01 RX ORDER — DILTIAZEM HYDROCHLORIDE 240 MG/1
240 CAPSULE, COATED, EXTENDED RELEASE ORAL DAILY
Status: DISCONTINUED | OUTPATIENT
Start: 2020-01-01 | End: 2020-01-01 | Stop reason: HOSPADM

## 2020-01-01 RX ORDER — NICOTINE POLACRILEX 4 MG
15 LOZENGE BUCCAL PRN
Status: DISCONTINUED | OUTPATIENT
Start: 2020-01-01 | End: 2020-05-05 | Stop reason: HOSPADM

## 2020-01-01 RX ORDER — DILTIAZEM HYDROCHLORIDE 5 MG/ML
10 INJECTION INTRAVENOUS ONCE
Status: DISCONTINUED | OUTPATIENT
Start: 2020-01-01 | End: 2020-01-01

## 2020-01-01 RX ORDER — BUMETANIDE 0.25 MG/ML
1 INJECTION, SOLUTION INTRAMUSCULAR; INTRAVENOUS 2 TIMES DAILY
Status: DISCONTINUED | OUTPATIENT
Start: 2020-01-01 | End: 2020-01-01

## 2020-01-01 RX ORDER — INSULIN GLARGINE 100 [IU]/ML
10 INJECTION, SOLUTION SUBCUTANEOUS NIGHTLY
Status: DISCONTINUED | OUTPATIENT
Start: 2020-01-01 | End: 2020-01-01

## 2020-01-01 RX ORDER — ATORVASTATIN CALCIUM 20 MG/1
20 TABLET, FILM COATED ORAL DAILY
Status: DISCONTINUED | OUTPATIENT
Start: 2020-01-01 | End: 2020-01-01 | Stop reason: HOSPADM

## 2020-01-01 RX ORDER — HYDROXYZINE 50 MG/1
100 TABLET, FILM COATED ORAL NIGHTLY
Status: CANCELLED | OUTPATIENT
Start: 2020-01-01

## 2020-01-01 RX ORDER — ATROPINE SULFATE 0.1 MG/ML
INJECTION INTRAVENOUS
Status: COMPLETED | OUTPATIENT
Start: 2020-01-01 | End: 2020-01-01

## 2020-01-01 RX ORDER — DEXAMETHASONE SODIUM PHOSPHATE 4 MG/ML
INJECTION, SOLUTION INTRA-ARTICULAR; INTRALESIONAL; INTRAMUSCULAR; INTRAVENOUS; SOFT TISSUE PRN
Status: DISCONTINUED | OUTPATIENT
Start: 2020-01-01 | End: 2020-01-01 | Stop reason: SDUPTHER

## 2020-01-01 RX ORDER — INSULIN GLARGINE 100 [IU]/ML
30 INJECTION, SOLUTION SUBCUTANEOUS NIGHTLY
Status: DISCONTINUED | OUTPATIENT
Start: 2020-01-01 | End: 2020-01-01 | Stop reason: HOSPADM

## 2020-01-01 RX ORDER — ALBUMIN, HUMAN INJ 5% 5 %
25 SOLUTION INTRAVENOUS ONCE
Status: COMPLETED | OUTPATIENT
Start: 2020-01-01 | End: 2020-01-01

## 2020-01-01 RX ORDER — HYDRALAZINE HYDROCHLORIDE 20 MG/ML
5 INJECTION INTRAMUSCULAR; INTRAVENOUS EVERY 10 MIN PRN
Status: DISCONTINUED | OUTPATIENT
Start: 2020-01-01 | End: 2020-01-01

## 2020-01-01 RX ORDER — ONDANSETRON 2 MG/ML
4 INJECTION INTRAMUSCULAR; INTRAVENOUS ONCE
Status: DISCONTINUED | OUTPATIENT
Start: 2020-01-01 | End: 2020-01-01 | Stop reason: HOSPADM

## 2020-01-01 RX ORDER — ACETAMINOPHEN 650 MG/1
650 SUPPOSITORY RECTAL EVERY 6 HOURS PRN
Status: DISCONTINUED | OUTPATIENT
Start: 2020-01-01 | End: 2020-01-01 | Stop reason: SDUPTHER

## 2020-01-01 RX ORDER — SODIUM CHLORIDE 9 MG/ML
INJECTION, SOLUTION INTRAVENOUS CONTINUOUS PRN
Status: DISCONTINUED | OUTPATIENT
Start: 2020-01-01 | End: 2020-01-01 | Stop reason: SDUPTHER

## 2020-01-01 RX ORDER — ACETAMINOPHEN 325 MG/1
650 TABLET ORAL EVERY 6 HOURS PRN
Status: DISCONTINUED | OUTPATIENT
Start: 2020-01-01 | End: 2020-01-01 | Stop reason: SDUPTHER

## 2020-01-01 RX ADMIN — DEXTROSE AND SODIUM CHLORIDE: 5; 900 INJECTION, SOLUTION INTRAVENOUS at 14:04

## 2020-01-01 RX ADMIN — ALBUTEROL SULFATE 2 PUFF: 90 AEROSOL, METERED RESPIRATORY (INHALATION) at 15:32

## 2020-01-01 RX ADMIN — SIMVASTATIN 20 MG: 10 TABLET, FILM COATED ORAL at 20:23

## 2020-01-01 RX ADMIN — BUDESONIDE AND FORMOTEROL FUMARATE DIHYDRATE 2 PUFF: 160; 4.5 AEROSOL RESPIRATORY (INHALATION) at 08:57

## 2020-01-01 RX ADMIN — ENOXAPARIN SODIUM 40 MG: 100 INJECTION SUBCUTANEOUS at 08:21

## 2020-01-01 RX ADMIN — CLONIDINE HYDROCHLORIDE 0.2 MG: 0.1 TABLET ORAL at 08:20

## 2020-01-01 RX ADMIN — AZITHROMYCIN MONOHYDRATE 500 MG: 500 INJECTION, POWDER, LYOPHILIZED, FOR SOLUTION INTRAVENOUS at 15:28

## 2020-01-01 RX ADMIN — ISOSORBIDE MONONITRATE 30 MG: 30 TABLET, EXTENDED RELEASE ORAL at 20:25

## 2020-01-01 RX ADMIN — BUDESONIDE AND FORMOTEROL FUMARATE DIHYDRATE 2 PUFF: 160; 4.5 AEROSOL RESPIRATORY (INHALATION) at 08:01

## 2020-01-01 RX ADMIN — MEROPENEM 1 G: 1 INJECTION, POWDER, FOR SOLUTION INTRAVENOUS at 06:20

## 2020-01-01 RX ADMIN — AMPICILLIN SODIUM AND SULBACTAM SODIUM 3 G: 2; 1 INJECTION, POWDER, FOR SOLUTION INTRAMUSCULAR; INTRAVENOUS at 11:22

## 2020-01-01 RX ADMIN — PANTOPRAZOLE SODIUM 40 MG: 40 INJECTION, POWDER, FOR SOLUTION INTRAVENOUS at 17:40

## 2020-01-01 RX ADMIN — METFORMIN HYDROCHLORIDE 500 MG: 500 TABLET ORAL at 08:51

## 2020-01-01 RX ADMIN — ENOXAPARIN SODIUM 40 MG: 100 INJECTION SUBCUTANEOUS at 10:12

## 2020-01-01 RX ADMIN — ENOXAPARIN SODIUM 40 MG: 100 INJECTION SUBCUTANEOUS at 08:49

## 2020-01-01 RX ADMIN — DEXTROSE 50 % IN WATER (D50W) INTRAVENOUS SYRINGE 50 ML: at 02:47

## 2020-01-01 RX ADMIN — ZOLPIDEM TARTRATE 10 MG: 5 TABLET ORAL at 20:28

## 2020-01-01 RX ADMIN — ACETAMINOPHEN 650 MG: 325 TABLET ORAL at 06:31

## 2020-01-01 RX ADMIN — BUMETANIDE 1 MG: 0.25 INJECTION INTRAMUSCULAR; INTRAVENOUS at 06:45

## 2020-01-01 RX ADMIN — CEFTRIAXONE SODIUM 2 G: 2 INJECTION, POWDER, FOR SOLUTION INTRAMUSCULAR; INTRAVENOUS at 09:41

## 2020-01-01 RX ADMIN — LACTULOSE 10 G: 10 SOLUTION ORAL at 08:55

## 2020-01-01 RX ADMIN — OXYCODONE HYDROCHLORIDE AND ACETAMINOPHEN 2 TABLET: 5; 325 TABLET ORAL at 08:18

## 2020-01-01 RX ADMIN — DILTIAZEM HYDROCHLORIDE 60 MG: 60 TABLET, FILM COATED ORAL at 06:21

## 2020-01-01 RX ADMIN — ALBUTEROL SULFATE 2 PUFF: 90 AEROSOL, METERED RESPIRATORY (INHALATION) at 13:17

## 2020-01-01 RX ADMIN — FERROUS SULFATE TAB 325 MG (65 MG ELEMENTAL FE) 325 MG: 325 (65 FE) TAB at 22:25

## 2020-01-01 RX ADMIN — SODIUM CHLORIDE, PRESERVATIVE FREE 10 ML: 5 INJECTION INTRAVENOUS at 20:07

## 2020-01-01 RX ADMIN — HYDROXYZINE HYDROCHLORIDE 100 MG: 50 TABLET, FILM COATED ORAL at 20:05

## 2020-01-01 RX ADMIN — LEVOTHYROXINE SODIUM 200 MCG: 100 TABLET ORAL at 06:22

## 2020-01-01 RX ADMIN — Medication 50 MEQ: at 17:09

## 2020-01-01 RX ADMIN — IPRATROPIUM BROMIDE AND ALBUTEROL SULFATE 3 ML: .5; 3 SOLUTION RESPIRATORY (INHALATION) at 12:34

## 2020-01-01 RX ADMIN — OXYCODONE HYDROCHLORIDE 5 MG: 5 TABLET ORAL at 14:23

## 2020-01-01 RX ADMIN — ATORVASTATIN CALCIUM 10 MG: 10 TABLET, FILM COATED ORAL at 09:29

## 2020-01-01 RX ADMIN — LEVOTHYROXINE SODIUM 200 MCG: 100 TABLET ORAL at 05:25

## 2020-01-01 RX ADMIN — DEXTROSE 15 G: 15 GEL ORAL at 23:45

## 2020-01-01 RX ADMIN — ALBUMIN (HUMAN) 25 G: 12.5 INJECTION, SOLUTION INTRAVENOUS at 15:56

## 2020-01-01 RX ADMIN — INSULIN LISPRO 2 UNITS: 100 INJECTION, SOLUTION INTRAVENOUS; SUBCUTANEOUS at 14:43

## 2020-01-01 RX ADMIN — ATORVASTATIN CALCIUM 20 MG: 20 TABLET, FILM COATED ORAL at 08:16

## 2020-01-01 RX ADMIN — ZOLPIDEM TARTRATE 10 MG: 5 TABLET ORAL at 21:50

## 2020-01-01 RX ADMIN — LEVOTHYROXINE SODIUM 200 MCG: 100 TABLET ORAL at 05:27

## 2020-01-01 RX ADMIN — HYDROXYZINE HYDROCHLORIDE 100 MG: 25 TABLET, FILM COATED ORAL at 21:30

## 2020-01-01 RX ADMIN — DEXTROSE MONOHYDRATE 5 MG/HR: 50 INJECTION, SOLUTION INTRAVENOUS at 20:49

## 2020-01-01 RX ADMIN — METRONIDAZOLE 500 MG: 500 INJECTION, SOLUTION INTRAVENOUS at 18:20

## 2020-01-01 RX ADMIN — SODIUM CHLORIDE, PRESERVATIVE FREE 10 ML: 5 INJECTION INTRAVENOUS at 23:27

## 2020-01-01 RX ADMIN — ZOLPIDEM TARTRATE 5 MG: 5 TABLET ORAL at 21:48

## 2020-01-01 RX ADMIN — SODIUM CHLORIDE, PRESERVATIVE FREE 10 ML: 5 INJECTION INTRAVENOUS at 21:29

## 2020-01-01 RX ADMIN — SODIUM CHLORIDE, PRESERVATIVE FREE 10 ML: 5 INJECTION INTRAVENOUS at 08:54

## 2020-01-01 RX ADMIN — DEXTROSE 50 % IN WATER (D50W) INTRAVENOUS SYRINGE 12.5 G: at 16:16

## 2020-01-01 RX ADMIN — INSULIN LISPRO 1 UNITS: 100 INJECTION, SOLUTION INTRAVENOUS; SUBCUTANEOUS at 21:26

## 2020-01-01 RX ADMIN — BUDESONIDE AND FORMOTEROL FUMARATE DIHYDRATE 2 PUFF: 160; 4.5 AEROSOL RESPIRATORY (INHALATION) at 21:30

## 2020-01-01 RX ADMIN — CLONIDINE HYDROCHLORIDE 0.2 MG: 0.1 TABLET ORAL at 20:51

## 2020-01-01 RX ADMIN — PIPERACILLIN AND TAZOBACTAM 4.5 G: 4; .5 INJECTION, POWDER, LYOPHILIZED, FOR SOLUTION INTRAVENOUS; PARENTERAL at 14:59

## 2020-01-01 RX ADMIN — CEFTRIAXONE SODIUM 2 G: 2 INJECTION, POWDER, FOR SOLUTION INTRAMUSCULAR; INTRAVENOUS at 17:47

## 2020-01-01 RX ADMIN — INSULIN LISPRO 2 UNITS: 100 INJECTION, SOLUTION INTRAVENOUS; SUBCUTANEOUS at 17:34

## 2020-01-01 RX ADMIN — COLLAGENASE SANTYL: 250 OINTMENT TOPICAL at 12:20

## 2020-01-01 RX ADMIN — SIMVASTATIN 20 MG: 10 TABLET, FILM COATED ORAL at 20:35

## 2020-01-01 RX ADMIN — INSULIN LISPRO 2 UNITS: 100 INJECTION, SOLUTION INTRAVENOUS; SUBCUTANEOUS at 22:15

## 2020-01-01 RX ADMIN — SENNOSIDES AND DOCUSATE SODIUM 2 TABLET: 8.6; 5 TABLET ORAL at 20:37

## 2020-01-01 RX ADMIN — LEVOTHYROXINE SODIUM 200 MCG: 100 TABLET ORAL at 10:15

## 2020-01-01 RX ADMIN — LACTULOSE 10 G: 10 SOLUTION ORAL at 09:55

## 2020-01-01 RX ADMIN — TORSEMIDE 20 MG: 20 TABLET ORAL at 08:49

## 2020-01-01 RX ADMIN — PIPERACILLIN AND TAZOBACTAM 3.38 G: 3; .375 INJECTION, POWDER, LYOPHILIZED, FOR SOLUTION INTRAVENOUS at 22:10

## 2020-01-01 RX ADMIN — LEVOTHYROXINE SODIUM 200 MCG: 100 TABLET ORAL at 08:11

## 2020-01-01 RX ADMIN — OXYCODONE HYDROCHLORIDE AND ACETAMINOPHEN 2 TABLET: 5; 325 TABLET ORAL at 20:42

## 2020-01-01 RX ADMIN — SIMVASTATIN 20 MG: 10 TABLET, FILM COATED ORAL at 21:50

## 2020-01-01 RX ADMIN — SODIUM CHLORIDE 1000 ML: 9 INJECTION, SOLUTION INTRAVENOUS at 15:56

## 2020-01-01 RX ADMIN — FERROUS SULFATE TAB 325 MG (65 MG ELEMENTAL FE) 325 MG: 325 (65 FE) TAB at 09:15

## 2020-01-01 RX ADMIN — OXYCODONE HYDROCHLORIDE AND ACETAMINOPHEN 2 TABLET: 5; 325 TABLET ORAL at 20:51

## 2020-01-01 RX ADMIN — TORSEMIDE 20 MG: 20 TABLET ORAL at 08:27

## 2020-01-01 RX ADMIN — ZOLPIDEM TARTRATE 10 MG: 5 TABLET ORAL at 20:37

## 2020-01-01 RX ADMIN — SODIUM CHLORIDE, PRESERVATIVE FREE 10 ML: 5 INJECTION INTRAVENOUS at 09:15

## 2020-01-01 RX ADMIN — ALBUTEROL SULFATE 2 PUFF: 90 AEROSOL, METERED RESPIRATORY (INHALATION) at 20:43

## 2020-01-01 RX ADMIN — IPRATROPIUM BROMIDE AND ALBUTEROL SULFATE 3 ML: .5; 3 SOLUTION RESPIRATORY (INHALATION) at 16:42

## 2020-01-01 RX ADMIN — DESMOPRESSIN ACETATE 36.76 MCG: 4 SOLUTION INTRAVENOUS at 07:12

## 2020-01-01 RX ADMIN — SODIUM CHLORIDE, PRESERVATIVE FREE 10 ML: 5 INJECTION INTRAVENOUS at 10:10

## 2020-01-01 RX ADMIN — Medication 1 MG: at 17:06

## 2020-01-01 RX ADMIN — LEVOTHYROXINE SODIUM 200 MCG: 100 TABLET ORAL at 06:02

## 2020-01-01 RX ADMIN — CALCIUM CHLORIDE 1 G: 100 INJECTION, SOLUTION INTRAVENOUS; INTRAVENTRICULAR at 17:15

## 2020-01-01 RX ADMIN — SODIUM CHLORIDE, PRESERVATIVE FREE 10 ML: 5 INJECTION INTRAVENOUS at 08:25

## 2020-01-01 RX ADMIN — BUMETANIDE 1 MG: 0.25 INJECTION INTRAMUSCULAR; INTRAVENOUS at 06:56

## 2020-01-01 RX ADMIN — SPIRONOLACTONE 100 MG: 50 TABLET ORAL at 09:29

## 2020-01-01 RX ADMIN — IPRATROPIUM BROMIDE AND ALBUTEROL SULFATE 1 AMPULE: .5; 3 SOLUTION RESPIRATORY (INHALATION) at 07:21

## 2020-01-01 RX ADMIN — ACETAMINOPHEN 650 MG: 325 TABLET ORAL at 20:28

## 2020-01-01 RX ADMIN — BUMETANIDE 1 MG: 0.25 INJECTION INTRAMUSCULAR; INTRAVENOUS at 18:04

## 2020-01-01 RX ADMIN — SPIRONOLACTONE 100 MG: 50 TABLET ORAL at 08:23

## 2020-01-01 RX ADMIN — IPRATROPIUM BROMIDE AND ALBUTEROL SULFATE 3 ML: .5; 3 SOLUTION RESPIRATORY (INHALATION) at 20:09

## 2020-01-01 RX ADMIN — MORPHINE SULFATE 4 MG: 4 INJECTION, SOLUTION INTRAMUSCULAR; INTRAVENOUS at 14:48

## 2020-01-01 RX ADMIN — Medication 1 MG: at 17:18

## 2020-01-01 RX ADMIN — ZOLPIDEM TARTRATE 10 MG: 5 TABLET ORAL at 22:25

## 2020-01-01 RX ADMIN — PIPERACILLIN AND TAZOBACTAM 3.38 G: 3; .375 INJECTION, POWDER, LYOPHILIZED, FOR SOLUTION INTRAVENOUS at 06:03

## 2020-01-01 RX ADMIN — DILTIAZEM HYDROCHLORIDE 60 MG: 60 TABLET, FILM COATED ORAL at 00:00

## 2020-01-01 RX ADMIN — CEFTRIAXONE SODIUM 2 G: 2 INJECTION, POWDER, FOR SOLUTION INTRAMUSCULAR; INTRAVENOUS at 04:30

## 2020-01-01 RX ADMIN — Medication 1 MG: at 17:12

## 2020-01-01 RX ADMIN — IPRATROPIUM BROMIDE AND ALBUTEROL SULFATE 3 ML: .5; 3 SOLUTION RESPIRATORY (INHALATION) at 15:26

## 2020-01-01 RX ADMIN — FENTANYL CITRATE 100 MCG: 50 INJECTION INTRAMUSCULAR; INTRAVENOUS at 17:25

## 2020-01-01 RX ADMIN — ACETAMINOPHEN 650 MG: 325 TABLET, FILM COATED ORAL at 08:36

## 2020-01-01 RX ADMIN — PIPERACILLIN AND TAZOBACTAM 3.38 G: 3; .375 INJECTION, POWDER, LYOPHILIZED, FOR SOLUTION INTRAVENOUS at 21:45

## 2020-01-01 RX ADMIN — SODIUM CHLORIDE, PRESERVATIVE FREE 10 ML: 5 INJECTION INTRAVENOUS at 09:14

## 2020-01-01 RX ADMIN — INSULIN LISPRO 2 UNITS: 100 INJECTION, SOLUTION INTRAVENOUS; SUBCUTANEOUS at 18:01

## 2020-01-01 RX ADMIN — CLONIDINE HYDROCHLORIDE 0.2 MG: 0.1 TABLET ORAL at 20:37

## 2020-01-01 RX ADMIN — FERROUS SULFATE TAB 325 MG (65 MG ELEMENTAL FE) 325 MG: 325 (65 FE) TAB at 09:48

## 2020-01-01 RX ADMIN — ZOLPIDEM TARTRATE 5 MG: 5 TABLET ORAL at 21:55

## 2020-01-01 RX ADMIN — DEXTROSE 15 G: 15 GEL ORAL at 09:22

## 2020-01-01 RX ADMIN — SODIUM CHLORIDE, PRESERVATIVE FREE 10 ML: 5 INJECTION INTRAVENOUS at 14:54

## 2020-01-01 RX ADMIN — FERROUS SULFATE TAB 325 MG (65 MG ELEMENTAL FE) 325 MG: 325 (65 FE) TAB at 09:14

## 2020-01-01 RX ADMIN — IPRATROPIUM BROMIDE 2 PUFF: 17 AEROSOL, METERED RESPIRATORY (INHALATION) at 16:19

## 2020-01-01 RX ADMIN — HYDROXYZINE HYDROCHLORIDE 100 MG: 25 TABLET, FILM COATED ORAL at 20:35

## 2020-01-01 RX ADMIN — INSULIN LISPRO 4 UNITS: 100 INJECTION, SOLUTION INTRAVENOUS; SUBCUTANEOUS at 11:13

## 2020-01-01 RX ADMIN — INSULIN LISPRO 2 UNITS: 100 INJECTION, SOLUTION INTRAVENOUS; SUBCUTANEOUS at 09:59

## 2020-01-01 RX ADMIN — VANCOMYCIN HYDROCHLORIDE 1500 MG: 5 INJECTION, POWDER, LYOPHILIZED, FOR SOLUTION INTRAVENOUS at 09:06

## 2020-01-01 RX ADMIN — AMPICILLIN SODIUM AND SULBACTAM SODIUM 3 G: 2; 1 INJECTION, POWDER, FOR SOLUTION INTRAMUSCULAR; INTRAVENOUS at 17:25

## 2020-01-01 RX ADMIN — ZOLPIDEM TARTRATE 5 MG: 5 TABLET ORAL at 23:26

## 2020-01-01 RX ADMIN — PANTOPRAZOLE SODIUM 40 MG: 40 TABLET, DELAYED RELEASE ORAL at 06:20

## 2020-01-01 RX ADMIN — Medication 100 MG: at 17:25

## 2020-01-01 RX ADMIN — DEXTROSE 15 G: 15 GEL ORAL at 09:23

## 2020-01-01 RX ADMIN — ACETAMINOPHEN 650 MG: 325 TABLET ORAL at 09:55

## 2020-01-01 RX ADMIN — BUDESONIDE AND FORMOTEROL FUMARATE DIHYDRATE 2 PUFF: 160; 4.5 AEROSOL RESPIRATORY (INHALATION) at 19:39

## 2020-01-01 RX ADMIN — EXEMESTANE 25 MG: 25 TABLET ORAL at 14:32

## 2020-01-01 RX ADMIN — INSULIN LISPRO 2 UNITS: 100 INJECTION, SOLUTION INTRAVENOUS; SUBCUTANEOUS at 05:41

## 2020-01-01 RX ADMIN — PIPERACILLIN AND TAZOBACTAM 3.38 G: 3; .375 INJECTION, POWDER, LYOPHILIZED, FOR SOLUTION INTRAVENOUS at 14:53

## 2020-01-01 RX ADMIN — IPRATROPIUM BROMIDE 2 PUFF: 17 AEROSOL, METERED RESPIRATORY (INHALATION) at 11:31

## 2020-01-01 RX ADMIN — LEVOTHYROXINE SODIUM 200 MCG: 100 TABLET ORAL at 06:06

## 2020-01-01 RX ADMIN — COLLAGENASE SANTYL: 250 OINTMENT TOPICAL at 09:15

## 2020-01-01 RX ADMIN — ISOSORBIDE MONONITRATE 30 MG: 30 TABLET, EXTENDED RELEASE ORAL at 20:06

## 2020-01-01 RX ADMIN — DEXTROSE: 20 INJECTION, SOLUTION INTRAVENOUS at 05:20

## 2020-01-01 RX ADMIN — LEVOTHYROXINE SODIUM 200 MCG: 100 TABLET ORAL at 06:39

## 2020-01-01 RX ADMIN — IPRATROPIUM BROMIDE AND ALBUTEROL SULFATE 3 ML: .5; 3 SOLUTION RESPIRATORY (INHALATION) at 20:43

## 2020-01-01 RX ADMIN — BUDESONIDE AND FORMOTEROL FUMARATE DIHYDRATE 2 PUFF: 160; 4.5 AEROSOL RESPIRATORY (INHALATION) at 08:08

## 2020-01-01 RX ADMIN — DILTIAZEM HYDROCHLORIDE 60 MG: 60 TABLET, FILM COATED ORAL at 00:09

## 2020-01-01 RX ADMIN — DEXTROSE 15 G: 15 GEL ORAL at 06:15

## 2020-01-01 RX ADMIN — TORSEMIDE 20 MG: 20 TABLET ORAL at 08:51

## 2020-01-01 RX ADMIN — AMPICILLIN SODIUM AND SULBACTAM SODIUM 3 G: 2; 1 INJECTION, POWDER, FOR SOLUTION INTRAMUSCULAR; INTRAVENOUS at 13:39

## 2020-01-01 RX ADMIN — ATORVASTATIN CALCIUM 20 MG: 20 TABLET, FILM COATED ORAL at 08:20

## 2020-01-01 RX ADMIN — INSULIN LISPRO 2 UNITS: 100 INJECTION, SOLUTION INTRAVENOUS; SUBCUTANEOUS at 01:20

## 2020-01-01 RX ADMIN — MORPHINE SULFATE 4 MG: 4 INJECTION, SOLUTION INTRAMUSCULAR; INTRAVENOUS at 21:47

## 2020-01-01 RX ADMIN — EXEMESTANE 25 MG: 25 TABLET ORAL at 08:26

## 2020-01-01 RX ADMIN — ZOLPIDEM TARTRATE 10 MG: 5 TABLET ORAL at 20:33

## 2020-01-01 RX ADMIN — HYDROXYZINE HYDROCHLORIDE 100 MG: 25 TABLET, FILM COATED ORAL at 22:24

## 2020-01-01 RX ADMIN — CLONIDINE HYDROCHLORIDE 0.2 MG: 0.1 TABLET ORAL at 08:21

## 2020-01-01 RX ADMIN — LACTULOSE 10 G: 10 SOLUTION ORAL at 08:52

## 2020-01-01 RX ADMIN — IPRATROPIUM BROMIDE AND ALBUTEROL SULFATE 3 ML: .5; 3 SOLUTION RESPIRATORY (INHALATION) at 23:35

## 2020-01-01 RX ADMIN — ALBUTEROL SULFATE 2 PUFF: 90 AEROSOL, METERED RESPIRATORY (INHALATION) at 07:43

## 2020-01-01 RX ADMIN — ZOLPIDEM TARTRATE 10 MG: 5 TABLET ORAL at 21:30

## 2020-01-01 RX ADMIN — DILTIAZEM HYDROCHLORIDE 60 MG: 60 TABLET, FILM COATED ORAL at 11:09

## 2020-01-01 RX ADMIN — CLONIDINE HYDROCHLORIDE 0.2 MG: 0.2 TABLET ORAL at 14:32

## 2020-01-01 RX ADMIN — FLUCONAZOLE 100 MG: 100 TABLET ORAL at 14:32

## 2020-01-01 RX ADMIN — INSULIN GLARGINE 10 UNITS: 100 INJECTION, SOLUTION SUBCUTANEOUS at 22:16

## 2020-01-01 RX ADMIN — ISOSORBIDE MONONITRATE 30 MG: 30 TABLET, EXTENDED RELEASE ORAL at 20:23

## 2020-01-01 RX ADMIN — PIPERACILLIN AND TAZOBACTAM 3.38 G: 3; .375 INJECTION, POWDER, LYOPHILIZED, FOR SOLUTION INTRAVENOUS at 06:18

## 2020-01-01 RX ADMIN — OXYCODONE HYDROCHLORIDE AND ACETAMINOPHEN 2 TABLET: 5; 325 TABLET ORAL at 06:18

## 2020-01-01 RX ADMIN — IPRATROPIUM BROMIDE 2 PUFF: 17 AEROSOL, METERED RESPIRATORY (INHALATION) at 15:32

## 2020-01-01 RX ADMIN — ISOSORBIDE MONONITRATE 30 MG: 30 TABLET, EXTENDED RELEASE ORAL at 20:28

## 2020-01-01 RX ADMIN — BUDESONIDE AND FORMOTEROL FUMARATE DIHYDRATE 2 PUFF: 160; 4.5 AEROSOL RESPIRATORY (INHALATION) at 08:32

## 2020-01-01 RX ADMIN — METRONIDAZOLE 500 MG: 500 INJECTION, SOLUTION INTRAVENOUS at 02:25

## 2020-01-01 RX ADMIN — SODIUM CHLORIDE 250 ML: 9 INJECTION, SOLUTION INTRAVENOUS at 10:10

## 2020-01-01 RX ADMIN — CLONIDINE HYDROCHLORIDE 0.2 MG: 0.1 TABLET ORAL at 08:47

## 2020-01-01 RX ADMIN — DILTIAZEM HYDROCHLORIDE 60 MG: 60 TABLET, FILM COATED ORAL at 17:25

## 2020-01-01 RX ADMIN — BUDESONIDE AND FORMOTEROL FUMARATE DIHYDRATE 2 PUFF: 160; 4.5 AEROSOL RESPIRATORY (INHALATION) at 20:21

## 2020-01-01 RX ADMIN — INSULIN LISPRO 2 UNITS: 100 INJECTION, SOLUTION INTRAVENOUS; SUBCUTANEOUS at 21:01

## 2020-01-01 RX ADMIN — MEROPENEM 1 G: 1 INJECTION, POWDER, FOR SOLUTION INTRAVENOUS at 20:27

## 2020-01-01 RX ADMIN — HYDROMORPHONE HYDROCHLORIDE 0.25 MG: 1 INJECTION, SOLUTION INTRAMUSCULAR; INTRAVENOUS; SUBCUTANEOUS at 10:29

## 2020-01-01 RX ADMIN — SODIUM CHLORIDE, PRESERVATIVE FREE 10 ML: 5 INJECTION INTRAVENOUS at 22:36

## 2020-01-01 RX ADMIN — PANTOPRAZOLE SODIUM 40 MG: 40 TABLET, DELAYED RELEASE ORAL at 06:39

## 2020-01-01 RX ADMIN — ALBUTEROL SULFATE 2 PUFF: 90 AEROSOL, METERED RESPIRATORY (INHALATION) at 11:31

## 2020-01-01 RX ADMIN — ONDANSETRON 4 MG: 2 INJECTION INTRAMUSCULAR; INTRAVENOUS at 18:43

## 2020-01-01 RX ADMIN — THROMBIN, TOPICAL (BOVINE): KIT at 06:09

## 2020-01-01 RX ADMIN — Medication 1 G: at 04:04

## 2020-01-01 RX ADMIN — ISOSORBIDE MONONITRATE 30 MG: 30 TABLET, EXTENDED RELEASE ORAL at 22:26

## 2020-01-01 RX ADMIN — INSULIN GLARGINE 10 UNITS: 100 INJECTION, SOLUTION SUBCUTANEOUS at 21:24

## 2020-01-01 RX ADMIN — ACETAMINOPHEN 650 MG: 325 TABLET, FILM COATED ORAL at 20:25

## 2020-01-01 RX ADMIN — GLIPIZIDE 5 MG: 5 TABLET ORAL at 09:26

## 2020-01-01 RX ADMIN — ISOSORBIDE MONONITRATE 30 MG: 30 TABLET, EXTENDED RELEASE ORAL at 22:24

## 2020-01-01 RX ADMIN — DEXTROSE MONOHYDRATE 100 ML/HR: 50 INJECTION, SOLUTION INTRAVENOUS at 10:15

## 2020-01-01 RX ADMIN — HYDROMORPHONE HYDROCHLORIDE 0.25 MG: 1 INJECTION, SOLUTION INTRAMUSCULAR; INTRAVENOUS; SUBCUTANEOUS at 20:13

## 2020-01-01 RX ADMIN — LACTULOSE 10 G: 10 SOLUTION ORAL at 09:35

## 2020-01-01 RX ADMIN — HYDROXYZINE HYDROCHLORIDE 100 MG: 25 TABLET, FILM COATED ORAL at 20:28

## 2020-01-01 RX ADMIN — SODIUM CHLORIDE, PRESERVATIVE FREE 10 ML: 5 INJECTION INTRAVENOUS at 21:46

## 2020-01-01 RX ADMIN — LIDOCAINE HYDROCHLORIDE ANHYDROUS 5 ML: 10 INJECTION, SOLUTION INFILTRATION at 18:55

## 2020-01-01 RX ADMIN — VANCOMYCIN HYDROCHLORIDE 1500 MG: 5 INJECTION, POWDER, LYOPHILIZED, FOR SOLUTION INTRAVENOUS at 04:09

## 2020-01-01 RX ADMIN — DEXTROSE 50 % IN WATER (D50W) INTRAVENOUS SYRINGE 12.5 G: at 16:45

## 2020-01-01 RX ADMIN — SODIUM CHLORIDE 1000 ML: 9 INJECTION, SOLUTION INTRAVENOUS at 17:19

## 2020-01-01 RX ADMIN — ISOSORBIDE MONONITRATE 30 MG: 30 TABLET, EXTENDED RELEASE ORAL at 20:35

## 2020-01-01 RX ADMIN — SODIUM BICARBONATE 25 MEQ: 84 INJECTION, SOLUTION INTRAVENOUS at 22:24

## 2020-01-01 RX ADMIN — MEROPENEM 1 G: 1 INJECTION, POWDER, FOR SOLUTION INTRAVENOUS at 15:58

## 2020-01-01 RX ADMIN — IPRATROPIUM BROMIDE AND ALBUTEROL SULFATE 1 AMPULE: .5; 3 SOLUTION RESPIRATORY (INHALATION) at 15:31

## 2020-01-01 RX ADMIN — PIPERACILLIN AND TAZOBACTAM 3.38 G: 3; .375 INJECTION, POWDER, LYOPHILIZED, FOR SOLUTION INTRAVENOUS at 06:02

## 2020-01-01 RX ADMIN — OXYCODONE HYDROCHLORIDE 5 MG: 5 TABLET ORAL at 08:52

## 2020-01-01 RX ADMIN — ISOSORBIDE MONONITRATE 30 MG: 30 TABLET, EXTENDED RELEASE ORAL at 23:47

## 2020-01-01 RX ADMIN — LIDOCAINE HYDROCHLORIDE 100 MG: 20 INJECTION, SOLUTION INTRAVENOUS at 17:25

## 2020-01-01 RX ADMIN — ATORVASTATIN CALCIUM 10 MG: 10 TABLET, FILM COATED ORAL at 09:47

## 2020-01-01 RX ADMIN — SODIUM CHLORIDE, PRESERVATIVE FREE 10 ML: 5 INJECTION INTRAVENOUS at 22:25

## 2020-01-01 RX ADMIN — DEXTROSE 15 G: 15 GEL ORAL at 06:32

## 2020-01-01 RX ADMIN — SODIUM CHLORIDE, PRESERVATIVE FREE 10 ML: 5 INJECTION INTRAVENOUS at 20:26

## 2020-01-01 RX ADMIN — IPRATROPIUM BROMIDE 2 PUFF: 17 AEROSOL, METERED RESPIRATORY (INHALATION) at 07:18

## 2020-01-01 RX ADMIN — CEFEPIME 2 G: 2 INJECTION, POWDER, FOR SOLUTION INTRAVENOUS at 19:14

## 2020-01-01 RX ADMIN — ATORVASTATIN CALCIUM 20 MG: 20 TABLET, FILM COATED ORAL at 21:28

## 2020-01-01 RX ADMIN — OXYCODONE HYDROCHLORIDE 5 MG: 5 TABLET ORAL at 15:11

## 2020-01-01 RX ADMIN — IPRATROPIUM BROMIDE AND ALBUTEROL SULFATE 1 AMPULE: .5; 3 SOLUTION RESPIRATORY (INHALATION) at 10:58

## 2020-01-01 RX ADMIN — MEROPENEM 1 G: 1 INJECTION, POWDER, FOR SOLUTION INTRAVENOUS at 06:33

## 2020-01-01 RX ADMIN — DILTIAZEM HYDROCHLORIDE 60 MG: 60 TABLET, FILM COATED ORAL at 06:39

## 2020-01-01 RX ADMIN — ENOXAPARIN SODIUM 40 MG: 40 INJECTION SUBCUTANEOUS at 09:48

## 2020-01-01 RX ADMIN — MEROPENEM 1 G: 1 INJECTION, POWDER, FOR SOLUTION INTRAVENOUS at 04:34

## 2020-01-01 RX ADMIN — ONDANSETRON 4 MG: 2 INJECTION INTRAMUSCULAR; INTRAVENOUS at 23:42

## 2020-01-01 RX ADMIN — ALBUTEROL SULFATE 2 PUFF: 90 AEROSOL, METERED RESPIRATORY (INHALATION) at 07:18

## 2020-01-01 RX ADMIN — VANCOMYCIN HYDROCHLORIDE 2 G: 1 INJECTION, POWDER, LYOPHILIZED, FOR SOLUTION INTRAVENOUS at 16:19

## 2020-01-01 RX ADMIN — ATROPINE SULFATE 1 MG: 0.1 INJECTION PARENTERAL at 17:01

## 2020-01-01 RX ADMIN — VANCOMYCIN HYDROCHLORIDE 1500 MG: 5 INJECTION, POWDER, LYOPHILIZED, FOR SOLUTION INTRAVENOUS at 16:10

## 2020-01-01 RX ADMIN — SIMVASTATIN 20 MG: 10 TABLET, FILM COATED ORAL at 21:30

## 2020-01-01 RX ADMIN — ALBUTEROL SULFATE 2 PUFF: 90 AEROSOL, METERED RESPIRATORY (INHALATION) at 19:35

## 2020-01-01 RX ADMIN — FLUCONAZOLE 100 MG: 100 TABLET ORAL at 08:23

## 2020-01-01 RX ADMIN — ENOXAPARIN SODIUM 40 MG: 40 INJECTION SUBCUTANEOUS at 09:29

## 2020-01-01 RX ADMIN — SODIUM CHLORIDE, POTASSIUM CHLORIDE, SODIUM LACTATE AND CALCIUM CHLORIDE: 600; 310; 30; 20 INJECTION, SOLUTION INTRAVENOUS at 16:25

## 2020-01-01 RX ADMIN — OXYCODONE HYDROCHLORIDE 5 MG: 5 TABLET ORAL at 21:28

## 2020-01-01 RX ADMIN — MEROPENEM 1 G: 1 INJECTION, POWDER, FOR SOLUTION INTRAVENOUS at 15:02

## 2020-01-01 RX ADMIN — CLONIDINE HYDROCHLORIDE 0.2 MG: 0.1 TABLET ORAL at 14:23

## 2020-01-01 RX ADMIN — BUMETANIDE 1 MG: 0.25 INJECTION INTRAMUSCULAR; INTRAVENOUS at 05:56

## 2020-01-01 RX ADMIN — OXYCODONE HYDROCHLORIDE AND ACETAMINOPHEN 2 TABLET: 5; 325 TABLET ORAL at 21:45

## 2020-01-01 RX ADMIN — SODIUM CHLORIDE, PRESERVATIVE FREE 10 ML: 5 INJECTION INTRAVENOUS at 21:50

## 2020-01-01 RX ADMIN — ZOLPIDEM TARTRATE 5 MG: 5 TABLET ORAL at 20:25

## 2020-01-01 RX ADMIN — ZOLPIDEM TARTRATE 10 MG: 5 TABLET ORAL at 23:47

## 2020-01-01 RX ADMIN — HYDROXYZINE HYDROCHLORIDE 100 MG: 25 TABLET, FILM COATED ORAL at 20:37

## 2020-01-01 RX ADMIN — DEXTROSE 15 G: 15 GEL ORAL at 04:43

## 2020-01-01 RX ADMIN — INSULIN LISPRO 2 UNITS: 100 INJECTION, SOLUTION INTRAVENOUS; SUBCUTANEOUS at 12:30

## 2020-01-01 RX ADMIN — ALBUTEROL SULFATE 2 PUFF: 90 AEROSOL, METERED RESPIRATORY (INHALATION) at 16:19

## 2020-01-01 RX ADMIN — LEVOTHYROXINE SODIUM 200 MCG: 100 TABLET ORAL at 06:21

## 2020-01-01 RX ADMIN — SUGAMMADEX 200 MG: 100 INJECTION, SOLUTION INTRAVENOUS at 19:13

## 2020-01-01 RX ADMIN — PANTOPRAZOLE SODIUM 40 MG: 40 TABLET, DELAYED RELEASE ORAL at 06:21

## 2020-01-01 RX ADMIN — ISOSORBIDE MONONITRATE 30 MG: 30 TABLET, EXTENDED RELEASE ORAL at 21:50

## 2020-01-01 RX ADMIN — MEROPENEM 1 G: 1 INJECTION, POWDER, FOR SOLUTION INTRAVENOUS at 15:11

## 2020-01-01 RX ADMIN — ATORVASTATIN CALCIUM 10 MG: 10 TABLET, FILM COATED ORAL at 23:26

## 2020-01-01 RX ADMIN — DEXTROSE MONOHYDRATE: 100 INJECTION, SOLUTION INTRAVENOUS at 16:46

## 2020-01-01 RX ADMIN — DEXTROSE 15 G: 15 GEL ORAL at 08:55

## 2020-01-01 RX ADMIN — ATORVASTATIN CALCIUM 20 MG: 20 TABLET, FILM COATED ORAL at 21:01

## 2020-01-01 RX ADMIN — SODIUM CHLORIDE, PRESERVATIVE FREE 10 ML: 5 INJECTION INTRAVENOUS at 09:31

## 2020-01-01 RX ADMIN — ALBUTEROL SULFATE 2 PUFF: 90 AEROSOL, METERED RESPIRATORY (INHALATION) at 07:52

## 2020-01-01 RX ADMIN — CEFTRIAXONE SODIUM 2 G: 2 INJECTION, POWDER, FOR SOLUTION INTRAMUSCULAR; INTRAVENOUS at 10:26

## 2020-01-01 RX ADMIN — VANCOMYCIN HYDROCHLORIDE 1500 MG: 5 INJECTION, POWDER, LYOPHILIZED, FOR SOLUTION INTRAVENOUS at 18:06

## 2020-01-01 RX ADMIN — OXYCODONE HYDROCHLORIDE 5 MG: 5 TABLET ORAL at 15:02

## 2020-01-01 RX ADMIN — LACTULOSE 10 G: 10 SOLUTION ORAL at 22:25

## 2020-01-01 RX ADMIN — GLUCAGON HYDROCHLORIDE 1 MG: KIT at 06:13

## 2020-01-01 RX ADMIN — ZOLPIDEM TARTRATE 10 MG: 5 TABLET ORAL at 22:24

## 2020-01-01 RX ADMIN — ENOXAPARIN SODIUM 40 MG: 40 INJECTION SUBCUTANEOUS at 08:23

## 2020-01-01 RX ADMIN — ISOSORBIDE MONONITRATE 30 MG: 30 TABLET, EXTENDED RELEASE ORAL at 22:27

## 2020-01-01 RX ADMIN — ALBUMIN (HUMAN) 25 G: 0.25 INJECTION, SOLUTION INTRAVENOUS at 08:06

## 2020-01-01 RX ADMIN — IPRATROPIUM BROMIDE AND ALBUTEROL SULFATE 3 ML: .5; 3 SOLUTION RESPIRATORY (INHALATION) at 08:45

## 2020-01-01 RX ADMIN — BUMETANIDE 1 MG: 0.25 INJECTION INTRAMUSCULAR; INTRAVENOUS at 17:52

## 2020-01-01 RX ADMIN — CLONIDINE HYDROCHLORIDE 0.2 MG: 0.2 TABLET ORAL at 22:25

## 2020-01-01 RX ADMIN — ISOSORBIDE MONONITRATE 30 MG: 30 TABLET, EXTENDED RELEASE ORAL at 21:01

## 2020-01-01 RX ADMIN — CLONIDINE HYDROCHLORIDE 0.2 MG: 0.2 TABLET ORAL at 05:32

## 2020-01-01 RX ADMIN — DILTIAZEM HYDROCHLORIDE 60 MG: 60 TABLET, FILM COATED ORAL at 14:10

## 2020-01-01 RX ADMIN — DILTIAZEM HYDROCHLORIDE 60 MG: 60 TABLET, FILM COATED ORAL at 14:04

## 2020-01-01 RX ADMIN — CEFTRIAXONE SODIUM 2 G: 2 INJECTION, POWDER, FOR SOLUTION INTRAMUSCULAR; INTRAVENOUS at 11:01

## 2020-01-01 RX ADMIN — COLLAGENASE SANTYL: 250 OINTMENT TOPICAL at 09:45

## 2020-01-01 RX ADMIN — MEROPENEM 1 G: 1 INJECTION, POWDER, FOR SOLUTION INTRAVENOUS at 14:38

## 2020-01-01 RX ADMIN — SODIUM CHLORIDE, PRESERVATIVE FREE 10 ML: 5 INJECTION INTRAVENOUS at 09:35

## 2020-01-01 RX ADMIN — IPRATROPIUM BROMIDE 2 PUFF: 17 AEROSOL, METERED RESPIRATORY (INHALATION) at 11:42

## 2020-01-01 RX ADMIN — TORSEMIDE 20 MG: 20 TABLET ORAL at 21:28

## 2020-01-01 RX ADMIN — SODIUM CHLORIDE, PRESERVATIVE FREE 10 ML: 5 INJECTION INTRAVENOUS at 22:11

## 2020-01-01 RX ADMIN — PANTOPRAZOLE SODIUM 40 MG: 40 INJECTION, POWDER, FOR SOLUTION INTRAVENOUS at 09:53

## 2020-01-01 RX ADMIN — METRONIDAZOLE 500 MG: 500 INJECTION, SOLUTION INTRAVENOUS at 20:33

## 2020-01-01 RX ADMIN — MEROPENEM 1 G: 1 INJECTION, POWDER, FOR SOLUTION INTRAVENOUS at 21:49

## 2020-01-01 RX ADMIN — BUMETANIDE 0.5 MG: 0.25 INJECTION INTRAMUSCULAR; INTRAVENOUS at 11:26

## 2020-01-01 RX ADMIN — INSULIN LISPRO 2 UNITS: 100 INJECTION, SOLUTION INTRAVENOUS; SUBCUTANEOUS at 09:26

## 2020-01-01 RX ADMIN — SODIUM CHLORIDE, PRESERVATIVE FREE 10 ML: 5 INJECTION INTRAVENOUS at 11:51

## 2020-01-01 RX ADMIN — COLLAGENASE SANTYL: 250 OINTMENT TOPICAL at 08:54

## 2020-01-01 RX ADMIN — DEXTROSE AND SODIUM CHLORIDE: 5; 900 INJECTION, SOLUTION INTRAVENOUS at 10:53

## 2020-01-01 RX ADMIN — LEVOTHYROXINE SODIUM 200 MCG: 100 TABLET ORAL at 06:20

## 2020-01-01 RX ADMIN — MEROPENEM 1 G: 1 INJECTION, POWDER, FOR SOLUTION INTRAVENOUS at 20:23

## 2020-01-01 RX ADMIN — TORSEMIDE 20 MG: 20 TABLET ORAL at 15:29

## 2020-01-01 RX ADMIN — IPRATROPIUM BROMIDE AND ALBUTEROL SULFATE 1 AMPULE: .5; 3 SOLUTION RESPIRATORY (INHALATION) at 15:10

## 2020-01-01 RX ADMIN — ZOLPIDEM TARTRATE 10 MG: 5 TABLET ORAL at 20:23

## 2020-01-01 RX ADMIN — IPRATROPIUM BROMIDE AND ALBUTEROL SULFATE 3 ML: .5; 3 SOLUTION RESPIRATORY (INHALATION) at 07:44

## 2020-01-01 RX ADMIN — MEROPENEM 1 G: 1 INJECTION, POWDER, FOR SOLUTION INTRAVENOUS at 05:28

## 2020-01-01 RX ADMIN — DILTIAZEM HYDROCHLORIDE 60 MG: 60 TABLET, FILM COATED ORAL at 17:13

## 2020-01-01 RX ADMIN — ENOXAPARIN SODIUM 135 MG: 150 INJECTION SUBCUTANEOUS at 21:31

## 2020-01-01 RX ADMIN — PANTOPRAZOLE SODIUM 40 MG: 40 TABLET, DELAYED RELEASE ORAL at 05:27

## 2020-01-01 RX ADMIN — TORSEMIDE 20 MG: 20 TABLET ORAL at 15:11

## 2020-01-01 RX ADMIN — SODIUM CHLORIDE 20 ML: 9 INJECTION, SOLUTION INTRAVENOUS at 11:41

## 2020-01-01 RX ADMIN — DEXAMETHASONE SODIUM PHOSPHATE 4 MG: 4 INJECTION, SOLUTION INTRAMUSCULAR; INTRAVENOUS at 17:33

## 2020-01-01 RX ADMIN — METOPROLOL TARTRATE 2 MG: 5 INJECTION, SOLUTION INTRAVENOUS at 17:46

## 2020-01-01 RX ADMIN — MAGNESIUM SULFATE IN WATER 2 G: 40 INJECTION, SOLUTION INTRAVENOUS at 08:49

## 2020-01-01 RX ADMIN — SENNOSIDES AND DOCUSATE SODIUM 2 TABLET: 8.6; 5 TABLET ORAL at 08:47

## 2020-01-01 RX ADMIN — DILTIAZEM HYDROCHLORIDE 60 MG: 60 TABLET, FILM COATED ORAL at 14:38

## 2020-01-01 RX ADMIN — SPIRONOLACTONE 100 MG: 50 TABLET ORAL at 20:06

## 2020-01-01 RX ADMIN — SODIUM CHLORIDE, PRESERVATIVE FREE 10 ML: 5 INJECTION INTRAVENOUS at 20:33

## 2020-01-01 RX ADMIN — SODIUM CHLORIDE, PRESERVATIVE FREE 10 ML: 5 INJECTION INTRAVENOUS at 20:28

## 2020-01-01 RX ADMIN — IPRATROPIUM BROMIDE 2 PUFF: 17 AEROSOL, METERED RESPIRATORY (INHALATION) at 11:11

## 2020-01-01 RX ADMIN — HYDROXYZINE HYDROCHLORIDE 100 MG: 25 TABLET, FILM COATED ORAL at 21:47

## 2020-01-01 RX ADMIN — INSULIN GLARGINE 10 UNITS: 100 INJECTION, SOLUTION SUBCUTANEOUS at 21:26

## 2020-01-01 RX ADMIN — MAGNESIUM SULFATE HEPTAHYDRATE 2 G: 40 INJECTION, SOLUTION INTRAVENOUS at 09:16

## 2020-01-01 RX ADMIN — IPRATROPIUM BROMIDE AND ALBUTEROL SULFATE 3 ML: .5; 3 SOLUTION RESPIRATORY (INHALATION) at 11:24

## 2020-01-01 RX ADMIN — ZOLPIDEM TARTRATE 10 MG: 5 TABLET ORAL at 21:45

## 2020-01-01 RX ADMIN — EXEMESTANE 25 MG: 25 TABLET ORAL at 10:56

## 2020-01-01 RX ADMIN — SODIUM CHLORIDE 20 ML: 9 INJECTION, SOLUTION INTRAVENOUS at 20:51

## 2020-01-01 RX ADMIN — SENNOSIDES AND DOCUSATE SODIUM 2 TABLET: 8.6; 5 TABLET ORAL at 20:51

## 2020-01-01 RX ADMIN — ISOSORBIDE MONONITRATE 30 MG: 30 TABLET, EXTENDED RELEASE ORAL at 20:37

## 2020-01-01 RX ADMIN — LACTULOSE 10 G: 10 SOLUTION ORAL at 09:15

## 2020-01-01 RX ADMIN — ISOSORBIDE MONONITRATE 30 MG: 30 TABLET, EXTENDED RELEASE ORAL at 22:04

## 2020-01-01 RX ADMIN — HYDROXYZINE HYDROCHLORIDE 100 MG: 25 TABLET, FILM COATED ORAL at 23:47

## 2020-01-01 RX ADMIN — PIPERACILLIN AND TAZOBACTAM 3.38 G: 3; .375 INJECTION, POWDER, LYOPHILIZED, FOR SOLUTION INTRAVENOUS at 22:28

## 2020-01-01 RX ADMIN — ENOXAPARIN SODIUM 40 MG: 100 INJECTION SUBCUTANEOUS at 22:04

## 2020-01-01 RX ADMIN — POTASSIUM CHLORIDE 40 MEQ: 20 TABLET, EXTENDED RELEASE ORAL at 03:00

## 2020-01-01 RX ADMIN — ATORVASTATIN CALCIUM 20 MG: 20 TABLET, FILM COATED ORAL at 08:21

## 2020-01-01 RX ADMIN — PIPERACILLIN AND TAZOBACTAM 3.38 G: 3; .375 INJECTION, POWDER, FOR SOLUTION INTRAVENOUS at 22:03

## 2020-01-01 RX ADMIN — LIDOCAINE HYDROCHLORIDE ANHYDROUS 5 ML: 10 INJECTION, SOLUTION INFILTRATION at 12:00

## 2020-01-01 RX ADMIN — ROCURONIUM BROMIDE 50 MG: 10 INJECTION INTRAVENOUS at 17:35

## 2020-01-01 RX ADMIN — OXYCODONE HYDROCHLORIDE 5 MG: 5 TABLET ORAL at 14:02

## 2020-01-01 RX ADMIN — SENNOSIDES AND DOCUSATE SODIUM 2 TABLET: 8.6; 5 TABLET ORAL at 08:20

## 2020-01-01 RX ADMIN — LEVOTHYROXINE SODIUM 200 MCG: 100 TABLET ORAL at 06:56

## 2020-01-01 RX ADMIN — AMPICILLIN SODIUM AND SULBACTAM SODIUM 3 G: 2; 1 INJECTION, POWDER, FOR SOLUTION INTRAMUSCULAR; INTRAVENOUS at 14:04

## 2020-01-01 RX ADMIN — BUMETANIDE 0.5 MG: 0.25 INJECTION INTRAMUSCULAR; INTRAVENOUS at 16:56

## 2020-01-01 RX ADMIN — LEVOTHYROXINE SODIUM 200 MCG: 100 TABLET ORAL at 05:05

## 2020-01-01 RX ADMIN — ACETAMINOPHEN 650 MG: 325 TABLET ORAL at 16:19

## 2020-01-01 RX ADMIN — TORSEMIDE 20 MG: 20 TABLET ORAL at 09:47

## 2020-01-01 RX ADMIN — BUMETANIDE 0.5 MG: 0.25 INJECTION INTRAMUSCULAR; INTRAVENOUS at 12:10

## 2020-01-01 RX ADMIN — INSULIN LISPRO 2 UNITS: 100 INJECTION, SOLUTION INTRAVENOUS; SUBCUTANEOUS at 17:52

## 2020-01-01 RX ADMIN — GLUCAGON HYDROCHLORIDE 1 MG: KIT at 05:18

## 2020-01-01 RX ADMIN — ATORVASTATIN CALCIUM 20 MG: 20 TABLET, FILM COATED ORAL at 20:25

## 2020-01-01 RX ADMIN — OXYCODONE HYDROCHLORIDE 5 MG: 5 TABLET ORAL at 08:49

## 2020-01-01 RX ADMIN — HYDROXYZINE HYDROCHLORIDE 100 MG: 25 TABLET, FILM COATED ORAL at 20:23

## 2020-01-01 RX ADMIN — ZOLPIDEM TARTRATE 5 MG: 5 TABLET ORAL at 00:13

## 2020-01-01 RX ADMIN — MEROPENEM 1 G: 1 INJECTION, POWDER, FOR SOLUTION INTRAVENOUS at 21:28

## 2020-01-01 RX ADMIN — LEVOTHYROXINE SODIUM 200 MCG: 100 TABLET ORAL at 05:29

## 2020-01-01 RX ADMIN — SODIUM CHLORIDE, PRESERVATIVE FREE 10 ML: 5 INJECTION INTRAVENOUS at 08:50

## 2020-01-01 RX ADMIN — HYDROXYZINE HYDROCHLORIDE 100 MG: 25 TABLET, FILM COATED ORAL at 22:00

## 2020-01-01 RX ADMIN — ACETAMINOPHEN 650 MG: 325 TABLET ORAL at 00:28

## 2020-01-01 RX ADMIN — IPRATROPIUM BROMIDE AND ALBUTEROL SULFATE 3 ML: .5; 3 SOLUTION RESPIRATORY (INHALATION) at 19:37

## 2020-01-01 RX ADMIN — BUDESONIDE AND FORMOTEROL FUMARATE DIHYDRATE 2 PUFF: 160; 4.5 AEROSOL RESPIRATORY (INHALATION) at 20:53

## 2020-01-01 RX ADMIN — FERROUS SULFATE TAB 325 MG (65 MG ELEMENTAL FE) 325 MG: 325 (65 FE) TAB at 08:23

## 2020-01-01 RX ADMIN — MAGNESIUM SULFATE HEPTAHYDRATE 4 G: 80 INJECTION, SOLUTION INTRAVENOUS at 12:51

## 2020-01-01 RX ADMIN — OXYCODONE HYDROCHLORIDE AND ACETAMINOPHEN 2 TABLET: 5; 325 TABLET ORAL at 11:04

## 2020-01-01 RX ADMIN — SODIUM CHLORIDE, PRESERVATIVE FREE 10 ML: 5 INJECTION INTRAVENOUS at 08:21

## 2020-01-01 RX ADMIN — IPRATROPIUM BROMIDE AND ALBUTEROL SULFATE 1 AMPULE: .5; 3 SOLUTION RESPIRATORY (INHALATION) at 19:20

## 2020-01-01 RX ADMIN — LACTULOSE 10 G: 10 SOLUTION ORAL at 09:14

## 2020-01-01 RX ADMIN — MEROPENEM 1 G: 1 INJECTION, POWDER, FOR SOLUTION INTRAVENOUS at 04:51

## 2020-01-01 RX ADMIN — PROPOFOL 100 MG: 10 INJECTION, EMULSION INTRAVENOUS at 17:25

## 2020-01-01 RX ADMIN — COLLAGENASE SANTYL: 250 OINTMENT TOPICAL at 08:21

## 2020-01-01 RX ADMIN — LEVOTHYROXINE SODIUM 200 MCG: 100 TABLET ORAL at 06:19

## 2020-01-01 RX ADMIN — LEVOTHYROXINE SODIUM 200 MCG: 100 TABLET ORAL at 06:33

## 2020-01-01 RX ADMIN — LEVOTHYROXINE SODIUM 200 MCG: 100 TABLET ORAL at 06:30

## 2020-01-01 RX ADMIN — SODIUM CHLORIDE, PRESERVATIVE FREE 10 ML: 5 INJECTION INTRAVENOUS at 10:13

## 2020-01-01 RX ADMIN — DILTIAZEM HYDROCHLORIDE 10 MG: 5 INJECTION INTRAVENOUS at 11:00

## 2020-01-01 RX ADMIN — HYDROXYZINE HYDROCHLORIDE 100 MG: 50 TABLET, FILM COATED ORAL at 22:34

## 2020-01-01 RX ADMIN — ALBUTEROL SULFATE 2 PUFF: 90 AEROSOL, METERED RESPIRATORY (INHALATION) at 11:42

## 2020-01-01 RX ADMIN — ALBUTEROL SULFATE 2 PUFF: 90 AEROSOL, METERED RESPIRATORY (INHALATION) at 11:11

## 2020-01-01 RX ADMIN — CLONIDINE HYDROCHLORIDE 0.2 MG: 0.2 TABLET ORAL at 05:05

## 2020-01-01 RX ADMIN — SODIUM CHLORIDE, PRESERVATIVE FREE 10 ML: 5 INJECTION INTRAVENOUS at 23:48

## 2020-01-01 RX ADMIN — TORSEMIDE 20 MG: 20 TABLET ORAL at 09:30

## 2020-01-01 RX ADMIN — DEXTROSE 15 G: 15 GEL ORAL at 02:10

## 2020-01-01 RX ADMIN — SODIUM CHLORIDE, PRESERVATIVE FREE 10 ML: 5 INJECTION INTRAVENOUS at 08:48

## 2020-01-01 RX ADMIN — CLONIDINE HYDROCHLORIDE 0.2 MG: 0.2 TABLET ORAL at 22:26

## 2020-01-01 RX ADMIN — SODIUM CHLORIDE, PRESERVATIVE FREE 10 ML: 5 INJECTION INTRAVENOUS at 21:31

## 2020-01-01 RX ADMIN — APIXABAN 5 MG: 5 TABLET, FILM COATED ORAL at 20:28

## 2020-01-01 RX ADMIN — SODIUM CHLORIDE, PRESERVATIVE FREE 10 ML: 5 INJECTION INTRAVENOUS at 09:57

## 2020-01-01 RX ADMIN — ACETAMINOPHEN 650 MG: 325 TABLET ORAL at 09:30

## 2020-01-01 RX ADMIN — CLONIDINE HYDROCHLORIDE 0.2 MG: 0.2 TABLET ORAL at 14:31

## 2020-01-01 RX ADMIN — DEXTROSE: 20 INJECTION, SOLUTION INTRAVENOUS at 12:15

## 2020-01-01 RX ADMIN — CLONIDINE HYDROCHLORIDE 0.2 MG: 0.1 TABLET ORAL at 13:05

## 2020-01-01 RX ADMIN — INSULIN LISPRO 2 UNITS: 100 INJECTION, SOLUTION INTRAVENOUS; SUBCUTANEOUS at 09:46

## 2020-01-01 RX ADMIN — EXEMESTANE 25 MG: 25 TABLET ORAL at 08:28

## 2020-01-01 RX ADMIN — IPRATROPIUM BROMIDE 2 PUFF: 17 AEROSOL, METERED RESPIRATORY (INHALATION) at 19:35

## 2020-01-01 RX ADMIN — Medication 1 MG: at 16:59

## 2020-01-01 RX ADMIN — SENNOSIDES AND DOCUSATE SODIUM 2 TABLET: 8.6; 5 TABLET ORAL at 21:46

## 2020-01-01 RX ADMIN — MORPHINE SULFATE 4 MG: 4 INJECTION, SOLUTION INTRAMUSCULAR; INTRAVENOUS at 17:12

## 2020-01-01 RX ADMIN — ATORVASTATIN CALCIUM 10 MG: 10 TABLET, FILM COATED ORAL at 08:23

## 2020-01-01 RX ADMIN — APIXABAN 2.5 MG: 2.5 TABLET, FILM COATED ORAL at 23:26

## 2020-01-01 RX ADMIN — METRONIDAZOLE 500 MG: 500 INJECTION, SOLUTION INTRAVENOUS at 10:23

## 2020-01-01 RX ADMIN — ZOLPIDEM TARTRATE 10 MG: 5 TABLET ORAL at 20:51

## 2020-01-01 RX ADMIN — BUDESONIDE AND FORMOTEROL FUMARATE DIHYDRATE 2 PUFF: 160; 4.5 AEROSOL RESPIRATORY (INHALATION) at 19:37

## 2020-01-01 RX ADMIN — DEXTROSE 15 G: 15 GEL ORAL at 21:59

## 2020-01-01 RX ADMIN — INSULIN LISPRO 1 UNITS: 100 INJECTION, SOLUTION INTRAVENOUS; SUBCUTANEOUS at 20:25

## 2020-01-01 RX ADMIN — IPRATROPIUM BROMIDE 2 PUFF: 17 AEROSOL, METERED RESPIRATORY (INHALATION) at 07:43

## 2020-01-01 RX ADMIN — IPRATROPIUM BROMIDE 2 PUFF: 17 AEROSOL, METERED RESPIRATORY (INHALATION) at 20:43

## 2020-01-01 RX ADMIN — ROCURONIUM BROMIDE 10 MG: 10 INJECTION INTRAVENOUS at 18:12

## 2020-01-01 RX ADMIN — CEFEPIME 2 G: 2 INJECTION, POWDER, FOR SOLUTION INTRAVENOUS at 06:57

## 2020-01-01 RX ADMIN — DILTIAZEM HYDROCHLORIDE 60 MG: 60 TABLET, FILM COATED ORAL at 01:04

## 2020-01-01 RX ADMIN — MORPHINE SULFATE 4 MG: 4 INJECTION, SOLUTION INTRAMUSCULAR; INTRAVENOUS at 11:09

## 2020-01-01 RX ADMIN — EXEMESTANE 25 MG: 25 TABLET ORAL at 10:00

## 2020-01-01 RX ADMIN — IOPAMIDOL 75 ML: 755 INJECTION, SOLUTION INTRAVENOUS at 15:27

## 2020-01-01 RX ADMIN — ATORVASTATIN CALCIUM 20 MG: 20 TABLET, FILM COATED ORAL at 08:47

## 2020-01-01 RX ADMIN — IPRATROPIUM BROMIDE AND ALBUTEROL SULFATE 3 ML: .5; 3 SOLUTION RESPIRATORY (INHALATION) at 08:04

## 2020-01-01 RX ADMIN — BUDESONIDE AND FORMOTEROL FUMARATE DIHYDRATE 2 PUFF: 160; 4.5 AEROSOL RESPIRATORY (INHALATION) at 08:45

## 2020-01-01 RX ADMIN — MAGNESIUM SULFATE HEPTAHYDRATE 2 G: 40 INJECTION, SOLUTION INTRAVENOUS at 18:16

## 2020-01-01 RX ADMIN — SODIUM PHOSPHATE, MONOBASIC, MONOHYDRATE 15 MMOL: 276; 142 INJECTION, SOLUTION INTRAVENOUS at 20:05

## 2020-01-01 RX ADMIN — PANTOPRAZOLE SODIUM 40 MG: 40 TABLET, DELAYED RELEASE ORAL at 06:33

## 2020-01-01 RX ADMIN — LEVOTHYROXINE SODIUM 200 MCG: 100 TABLET ORAL at 05:02

## 2020-01-01 RX ADMIN — CLONIDINE HYDROCHLORIDE 0.2 MG: 0.1 TABLET ORAL at 08:15

## 2020-01-01 RX ADMIN — INSULIN GLARGINE 10 UNITS: 100 INJECTION, SOLUTION SUBCUTANEOUS at 21:01

## 2020-01-01 RX ADMIN — SENNOSIDES AND DOCUSATE SODIUM 2 TABLET: 8.6; 5 TABLET ORAL at 08:15

## 2020-01-01 RX ADMIN — METRONIDAZOLE 500 MG: 500 INJECTION, SOLUTION INTRAVENOUS at 01:04

## 2020-01-01 RX ADMIN — IPRATROPIUM BROMIDE AND ALBUTEROL SULFATE 3 ML: .5; 3 SOLUTION RESPIRATORY (INHALATION) at 20:27

## 2020-01-01 RX ADMIN — ZOLPIDEM TARTRATE 10 MG: 5 TABLET ORAL at 23:10

## 2020-01-01 RX ADMIN — APIXABAN 2.5 MG: 2.5 TABLET, FILM COATED ORAL at 13:39

## 2020-01-01 RX ADMIN — IPRATROPIUM BROMIDE AND ALBUTEROL SULFATE 3 ML: .5; 3 SOLUTION RESPIRATORY (INHALATION) at 08:08

## 2020-01-01 RX ADMIN — TORSEMIDE 20 MG: 20 TABLET ORAL at 20:35

## 2020-01-01 RX ADMIN — DEXTROSE MONOHYDRATE: 100 INJECTION, SOLUTION INTRAVENOUS at 05:25

## 2020-01-01 RX ADMIN — CLONIDINE HYDROCHLORIDE 0.2 MG: 0.1 TABLET ORAL at 22:24

## 2020-01-01 RX ADMIN — LACTULOSE 10 G: 10 SOLUTION ORAL at 09:00

## 2020-01-01 RX ADMIN — SIMVASTATIN 20 MG: 10 TABLET, FILM COATED ORAL at 22:26

## 2020-01-01 RX ADMIN — CLONIDINE HYDROCHLORIDE 0.2 MG: 0.2 TABLET ORAL at 20:05

## 2020-01-01 RX ADMIN — EXEMESTANE 25 MG: 25 TABLET ORAL at 12:10

## 2020-01-01 RX ADMIN — VANCOMYCIN HYDROCHLORIDE 1500 MG: 5 INJECTION, POWDER, LYOPHILIZED, FOR SOLUTION INTRAVENOUS at 02:49

## 2020-01-01 RX ADMIN — ENOXAPARIN SODIUM 40 MG: 40 INJECTION SUBCUTANEOUS at 22:24

## 2020-01-01 RX ADMIN — SODIUM CHLORIDE: 9 INJECTION, SOLUTION INTRAVENOUS at 13:22

## 2020-01-01 RX ADMIN — SIMVASTATIN 20 MG: 10 TABLET, FILM COATED ORAL at 20:28

## 2020-01-01 RX ADMIN — DEXTROSE AND SODIUM CHLORIDE: 5; 900 INJECTION, SOLUTION INTRAVENOUS at 00:34

## 2020-01-01 RX ADMIN — ISOSORBIDE MONONITRATE 30 MG: 30 TABLET, EXTENDED RELEASE ORAL at 21:30

## 2020-01-01 RX ADMIN — BUDESONIDE AND FORMOTEROL FUMARATE DIHYDRATE 2 PUFF: 160; 4.5 AEROSOL RESPIRATORY (INHALATION) at 07:18

## 2020-01-01 RX ADMIN — INSULIN LISPRO 2 UNITS: 100 INJECTION, SOLUTION INTRAVENOUS; SUBCUTANEOUS at 13:16

## 2020-01-01 RX ADMIN — ISOSORBIDE MONONITRATE 30 MG: 30 TABLET, EXTENDED RELEASE ORAL at 21:46

## 2020-01-01 RX ADMIN — VANCOMYCIN HYDROCHLORIDE 1500 MG: 5 INJECTION, POWDER, LYOPHILIZED, FOR SOLUTION INTRAVENOUS at 03:22

## 2020-01-01 RX ADMIN — BUMETANIDE 0.5 MG: 0.25 INJECTION INTRAMUSCULAR; INTRAVENOUS at 05:28

## 2020-01-01 RX ADMIN — SODIUM CHLORIDE 1000 ML: 9 INJECTION, SOLUTION INTRAVENOUS at 04:44

## 2020-01-01 RX ADMIN — PIPERACILLIN AND TAZOBACTAM 3.38 G: 3; .375 INJECTION, POWDER, FOR SOLUTION INTRAVENOUS at 16:56

## 2020-01-01 RX ADMIN — SODIUM CHLORIDE, POTASSIUM CHLORIDE, SODIUM LACTATE AND CALCIUM CHLORIDE: 600; 310; 30; 20 INJECTION, SOLUTION INTRAVENOUS at 17:23

## 2020-01-01 RX ADMIN — ISOSORBIDE MONONITRATE 30 MG: 30 TABLET, EXTENDED RELEASE ORAL at 20:51

## 2020-01-01 RX ADMIN — BUMETANIDE 1 MG: 0.25 INJECTION INTRAMUSCULAR; INTRAVENOUS at 19:14

## 2020-01-01 RX ADMIN — DILTIAZEM HYDROCHLORIDE 60 MG: 60 TABLET, FILM COATED ORAL at 17:47

## 2020-01-01 RX ADMIN — MORPHINE SULFATE 4 MG: 4 INJECTION, SOLUTION INTRAMUSCULAR; INTRAVENOUS at 18:10

## 2020-01-01 RX ADMIN — DILTIAZEM HYDROCHLORIDE 60 MG: 60 TABLET, FILM COATED ORAL at 06:33

## 2020-01-01 RX ADMIN — CLONIDINE HYDROCHLORIDE 0.2 MG: 0.1 TABLET ORAL at 23:47

## 2020-01-01 RX ADMIN — SODIUM CHLORIDE, PRESERVATIVE FREE 10 ML: 5 INJECTION INTRAVENOUS at 09:53

## 2020-01-01 RX ADMIN — SODIUM CHLORIDE, PRESERVATIVE FREE 10 ML: 5 INJECTION INTRAVENOUS at 21:37

## 2020-01-01 RX ADMIN — SPIRONOLACTONE 100 MG: 50 TABLET ORAL at 09:47

## 2020-01-01 RX ADMIN — OXYCODONE HYDROCHLORIDE 5 MG: 5 TABLET ORAL at 08:20

## 2020-01-01 RX ADMIN — BUDESONIDE AND FORMOTEROL FUMARATE DIHYDRATE 2 PUFF: 160; 4.5 AEROSOL RESPIRATORY (INHALATION) at 20:37

## 2020-01-01 RX ADMIN — COLLAGENASE SANTYL: 250 OINTMENT TOPICAL at 09:56

## 2020-01-01 RX ADMIN — IPRATROPIUM BROMIDE 2 PUFF: 17 AEROSOL, METERED RESPIRATORY (INHALATION) at 07:53

## 2020-01-01 RX ADMIN — COLLAGENASE SANTYL: 250 OINTMENT TOPICAL at 09:51

## 2020-01-01 RX ADMIN — CLONIDINE HYDROCHLORIDE 0.2 MG: 0.2 TABLET ORAL at 15:59

## 2020-01-01 RX ADMIN — IPRATROPIUM BROMIDE AND ALBUTEROL SULFATE 3 ML: .5; 3 SOLUTION RESPIRATORY (INHALATION) at 07:58

## 2020-01-01 RX ADMIN — IPRATROPIUM BROMIDE AND ALBUTEROL SULFATE 3 ML: .5; 3 SOLUTION RESPIRATORY (INHALATION) at 08:47

## 2020-01-01 RX ADMIN — INSULIN LISPRO 2 UNITS: 100 INJECTION, SOLUTION INTRAVENOUS; SUBCUTANEOUS at 00:39

## 2020-01-01 RX ADMIN — ATORVASTATIN CALCIUM 20 MG: 20 TABLET, FILM COATED ORAL at 22:04

## 2020-01-01 RX ADMIN — SODIUM CHLORIDE, PRESERVATIVE FREE 10 ML: 5 INJECTION INTRAVENOUS at 08:22

## 2020-01-01 RX ADMIN — CEFTRIAXONE SODIUM 2 G: 2 INJECTION, POWDER, FOR SOLUTION INTRAMUSCULAR; INTRAVENOUS at 20:32

## 2020-01-01 RX ADMIN — SODIUM CHLORIDE: 9 INJECTION, SOLUTION INTRAVENOUS at 18:58

## 2020-01-01 RX ADMIN — AMPICILLIN SODIUM AND SULBACTAM SODIUM 3 G: 2; 1 INJECTION, POWDER, FOR SOLUTION INTRAMUSCULAR; INTRAVENOUS at 23:26

## 2020-01-01 RX ADMIN — LEVOTHYROXINE SODIUM 200 MCG: 100 TABLET ORAL at 04:09

## 2020-01-01 RX ADMIN — SPIRONOLACTONE 100 MG: 50 TABLET ORAL at 22:27

## 2020-01-01 RX ADMIN — PIPERACILLIN AND TAZOBACTAM 3.38 G: 3; .375 INJECTION, POWDER, LYOPHILIZED, FOR SOLUTION INTRAVENOUS at 13:05

## 2020-01-01 RX ADMIN — IPRATROPIUM BROMIDE AND ALBUTEROL SULFATE 3 ML: .5; 3 SOLUTION RESPIRATORY (INHALATION) at 16:19

## 2020-01-01 RX ADMIN — BUMETANIDE 0.5 MG: 0.25 INJECTION INTRAMUSCULAR; INTRAVENOUS at 06:22

## 2020-01-01 RX ADMIN — MEROPENEM 1 G: 1 INJECTION, POWDER, FOR SOLUTION INTRAVENOUS at 15:06

## 2020-01-01 RX ADMIN — LACTULOSE 10 G: 10 SOLUTION ORAL at 08:20

## 2020-01-01 RX ADMIN — IPRATROPIUM BROMIDE AND ALBUTEROL SULFATE 3 ML: .5; 3 SOLUTION RESPIRATORY (INHALATION) at 16:08

## 2020-01-01 RX ADMIN — IPRATROPIUM BROMIDE AND ALBUTEROL SULFATE 3 ML: .5; 3 SOLUTION RESPIRATORY (INHALATION) at 16:02

## 2020-01-01 RX ADMIN — PIPERACILLIN AND TAZOBACTAM 3.38 G: 3; .375 INJECTION, POWDER, FOR SOLUTION INTRAVENOUS at 06:22

## 2020-01-01 RX ADMIN — ISOSORBIDE MONONITRATE 30 MG: 30 TABLET, EXTENDED RELEASE ORAL at 21:28

## 2020-01-01 RX ADMIN — BUMETANIDE 0.5 MG: 0.25 INJECTION INTRAMUSCULAR; INTRAVENOUS at 22:03

## 2020-01-01 RX ADMIN — IPRATROPIUM BROMIDE AND ALBUTEROL SULFATE 1 AMPULE: .5; 3 SOLUTION RESPIRATORY (INHALATION) at 19:56

## 2020-01-01 RX ADMIN — DILTIAZEM HYDROCHLORIDE 60 MG: 60 TABLET, FILM COATED ORAL at 18:04

## 2020-01-01 RX ADMIN — Medication 6 MILLICURIE: at 08:25

## 2020-01-01 RX ADMIN — SODIUM CHLORIDE, PRESERVATIVE FREE 10 ML: 5 INJECTION INTRAVENOUS at 09:48

## 2020-01-01 RX ADMIN — MEROPENEM 1 G: 1 INJECTION, POWDER, FOR SOLUTION INTRAVENOUS at 21:13

## 2020-01-01 RX ADMIN — CLONIDINE HYDROCHLORIDE 0.2 MG: 0.2 TABLET ORAL at 06:30

## 2020-01-01 RX ADMIN — MEROPENEM 1 G: 1 INJECTION, POWDER, FOR SOLUTION INTRAVENOUS at 12:51

## 2020-01-01 ASSESSMENT — PAIN DESCRIPTION - DESCRIPTORS
DESCRIPTORS: ACHING;DISCOMFORT
DESCRIPTORS: ACHING;CONSTANT
DESCRIPTORS: ACHING
DESCRIPTORS: ACHING;CONSTANT
DESCRIPTORS: ACHING
DESCRIPTORS: ACHING;CONSTANT
DESCRIPTORS: ACHING
DESCRIPTORS: ACHING;CONSTANT
DESCRIPTORS: ACHING;CONSTANT
DESCRIPTORS: SPASM;TENDER
DESCRIPTORS: ACHING;PRESSURE
DESCRIPTORS: ACHING
DESCRIPTORS: STABBING
DESCRIPTORS: SHARP
DESCRIPTORS: ACHING
DESCRIPTORS: ACHING
DESCRIPTORS: STABBING
DESCRIPTORS: ACHING
DESCRIPTORS: STABBING
DESCRIPTORS: ACHING

## 2020-01-01 ASSESSMENT — PAIN DESCRIPTION - LOCATION
LOCATION: COCCYX
LOCATION: ABDOMEN
LOCATION: GENERALIZED;ABDOMEN
LOCATION: ABDOMEN
LOCATION: ABDOMEN
LOCATION: ANKLE
LOCATION: ABDOMEN
LOCATION: FLANK
LOCATION: ABDOMEN
LOCATION: ANKLE
LOCATION: ABDOMEN
LOCATION: ANKLE

## 2020-01-01 ASSESSMENT — ENCOUNTER SYMPTOMS
SHORTNESS OF BREATH: 1
COUGH: 0
PHOTOPHOBIA: 0
EYE ITCHING: 0
STRIDOR: 0
RECTAL PAIN: 0
APNEA: 0
EYE REDNESS: 0
CHEST TIGHTNESS: 0
COUGH: 0
CHEST TIGHTNESS: 0
CONSTIPATION: 0
ABDOMINAL PAIN: 0
SORE THROAT: 0
BACK PAIN: 1
ABDOMINAL PAIN: 1
DIARRHEA: 0
BACK PAIN: 0
ANAL BLEEDING: 0
SHORTNESS OF BREATH: 1
CONSTIPATION: 0
CHOKING: 0
COLOR CHANGE: 0
SHORTNESS OF BREATH: 1
BACK PAIN: 1
DIARRHEA: 0
SHORTNESS OF BREATH: 1
CONSTIPATION: 0
ABDOMINAL PAIN: 0

## 2020-01-01 ASSESSMENT — PAIN DESCRIPTION - PROGRESSION
CLINICAL_PROGRESSION: NOT CHANGED
CLINICAL_PROGRESSION: GRADUALLY WORSENING
CLINICAL_PROGRESSION: GRADUALLY WORSENING
CLINICAL_PROGRESSION: NOT CHANGED

## 2020-01-01 ASSESSMENT — PULMONARY FUNCTION TESTS
PIF_VALUE: 35
PIF_VALUE: 31
PIF_VALUE: 34
PIF_VALUE: 30
PIF_VALUE: 29
PIF_VALUE: 27
PIF_VALUE: 31
PIF_VALUE: 29
PIF_VALUE: 30
PIF_VALUE: 31
PIF_VALUE: 30
PIF_VALUE: 31
PIF_VALUE: 30
PIF_VALUE: 1
PIF_VALUE: 23
PIF_VALUE: 31
PIF_VALUE: 28
PIF_VALUE: 28
PIF_VALUE: 30
PIF_VALUE: 30
PIF_VALUE: 20
PIF_VALUE: 31
PIF_VALUE: 30
PIF_VALUE: 1
PIF_VALUE: 32
PIF_VALUE: 34
PIF_VALUE: 27
PIF_VALUE: 30
PIF_VALUE: 34
PIF_VALUE: 30
PIF_VALUE: 32
PIF_VALUE: 34
PIF_VALUE: 30
PIF_VALUE: 1
PIF_VALUE: 30
PIF_VALUE: 26
PIF_VALUE: 23
PIF_VALUE: 24
PIF_VALUE: 29
PIF_VALUE: 30
PIF_VALUE: 25
PIF_VALUE: 30
PIF_VALUE: 6
PIF_VALUE: 40
PIF_VALUE: 31
PIF_VALUE: 28
PIF_VALUE: 25
PIF_VALUE: 29
PIF_VALUE: 31
PIF_VALUE: 30
PIF_VALUE: 27
PIF_VALUE: 30
PIF_VALUE: 30
PIF_VALUE: 31
PIF_VALUE: 30
PIF_VALUE: 29
PIF_VALUE: 28
PIF_VALUE: 28
PIF_VALUE: 33
PIF_VALUE: 34
PIF_VALUE: 30
PIF_VALUE: 26
PIF_VALUE: 34
PIF_VALUE: 30
PIF_VALUE: 3
PIF_VALUE: 30
PIF_VALUE: 0
PIF_VALUE: 30
PIF_VALUE: 1
PIF_VALUE: 30
PIF_VALUE: 30
PIF_VALUE: 31
PIF_VALUE: 34
PIF_VALUE: 30
PIF_VALUE: 27
PIF_VALUE: 30
PIF_VALUE: 30
PIF_VALUE: 32
PIF_VALUE: 33
PIF_VALUE: 30
PIF_VALUE: 29
PIF_VALUE: 32
PIF_VALUE: 27
PIF_VALUE: 35
PIF_VALUE: 23
PIF_VALUE: 34
PIF_VALUE: 29
PIF_VALUE: 30
PIF_VALUE: 34
PIF_VALUE: 34
PIF_VALUE: 6
PIF_VALUE: 26
PIF_VALUE: 30
PIF_VALUE: 31
PIF_VALUE: 26
PIF_VALUE: 22
PIF_VALUE: 26
PIF_VALUE: 30
PIF_VALUE: 28
PIF_VALUE: 34
PIF_VALUE: 30
PIF_VALUE: 33
PIF_VALUE: 28
PIF_VALUE: 32
PIF_VALUE: 35
PIF_VALUE: 30
PIF_VALUE: 32

## 2020-01-01 ASSESSMENT — PAIN DESCRIPTION - FREQUENCY
FREQUENCY: INTERMITTENT
FREQUENCY: CONTINUOUS
FREQUENCY: INTERMITTENT
FREQUENCY: CONTINUOUS

## 2020-01-01 ASSESSMENT — PAIN - FUNCTIONAL ASSESSMENT
PAIN_FUNCTIONAL_ASSESSMENT: PREVENTS OR INTERFERES SOME ACTIVE ACTIVITIES AND ADLS
PAIN_FUNCTIONAL_ASSESSMENT: ACTIVITIES ARE NOT PREVENTED
PAIN_FUNCTIONAL_ASSESSMENT: 0-10
PAIN_FUNCTIONAL_ASSESSMENT: 0-10
PAIN_FUNCTIONAL_ASSESSMENT: ACTIVITIES ARE NOT PREVENTED
PAIN_FUNCTIONAL_ASSESSMENT: PREVENTS OR INTERFERES WITH ALL ACTIVE AND SOME PASSIVE ACTIVITIES
PAIN_FUNCTIONAL_ASSESSMENT: ACTIVITIES ARE NOT PREVENTED

## 2020-01-01 ASSESSMENT — PAIN DESCRIPTION - ONSET
ONSET: ON-GOING
ONSET: GRADUAL
ONSET: ON-GOING
ONSET: ON-GOING
ONSET: GRADUAL
ONSET: ON-GOING

## 2020-01-01 ASSESSMENT — PAIN DESCRIPTION - ORIENTATION
ORIENTATION: RIGHT
ORIENTATION: RIGHT;LOWER
ORIENTATION: RIGHT
ORIENTATION: RIGHT;LOWER
ORIENTATION: MID
ORIENTATION: RIGHT;LOWER
ORIENTATION: ANTERIOR
ORIENTATION: RIGHT;UPPER
ORIENTATION: RIGHT;LEFT
ORIENTATION: RIGHT
ORIENTATION: RIGHT;LOWER
ORIENTATION: RIGHT
ORIENTATION: RIGHT;LOWER
ORIENTATION: RIGHT
ORIENTATION: RIGHT
ORIENTATION: RIGHT;LOWER
ORIENTATION: RIGHT;LOWER

## 2020-01-01 ASSESSMENT — PAIN SCALES - GENERAL
PAINLEVEL_OUTOF10: 3
PAINLEVEL_OUTOF10: 7
PAINLEVEL_OUTOF10: 0
PAINLEVEL_OUTOF10: 3
PAINLEVEL_OUTOF10: 0
PAINLEVEL_OUTOF10: 7
PAINLEVEL_OUTOF10: 3
PAINLEVEL_OUTOF10: 9
PAINLEVEL_OUTOF10: 4
PAINLEVEL_OUTOF10: 9
PAINLEVEL_OUTOF10: 5
PAINLEVEL_OUTOF10: 0
PAINLEVEL_OUTOF10: 4
PAINLEVEL_OUTOF10: 8
PAINLEVEL_OUTOF10: 3
PAINLEVEL_OUTOF10: 3
PAINLEVEL_OUTOF10: 0
PAINLEVEL_OUTOF10: 7
PAINLEVEL_OUTOF10: 0
PAINLEVEL_OUTOF10: 7
PAINLEVEL_OUTOF10: 6
PAINLEVEL_OUTOF10: 5
PAINLEVEL_OUTOF10: 0
PAINLEVEL_OUTOF10: 0
PAINLEVEL_OUTOF10: 5
PAINLEVEL_OUTOF10: 5
PAINLEVEL_OUTOF10: 3
PAINLEVEL_OUTOF10: 5
PAINLEVEL_OUTOF10: 0
PAINLEVEL_OUTOF10: 7
PAINLEVEL_OUTOF10: 8
PAINLEVEL_OUTOF10: 8
PAINLEVEL_OUTOF10: 7
PAINLEVEL_OUTOF10: 0
PAINLEVEL_OUTOF10: 0
PAINLEVEL_OUTOF10: 6
PAINLEVEL_OUTOF10: 3
PAINLEVEL_OUTOF10: 9
PAINLEVEL_OUTOF10: 3
PAINLEVEL_OUTOF10: 0
PAINLEVEL_OUTOF10: 9
PAINLEVEL_OUTOF10: 0
PAINLEVEL_OUTOF10: 7
PAINLEVEL_OUTOF10: 0
PAINLEVEL_OUTOF10: 3
PAINLEVEL_OUTOF10: 4
PAINLEVEL_OUTOF10: 3
PAINLEVEL_OUTOF10: 6
PAINLEVEL_OUTOF10: 8
PAINLEVEL_OUTOF10: 7
PAINLEVEL_OUTOF10: 0
PAINLEVEL_OUTOF10: 7
PAINLEVEL_OUTOF10: 3
PAINLEVEL_OUTOF10: 0
PAINLEVEL_OUTOF10: 6
PAINLEVEL_OUTOF10: 8
PAINLEVEL_OUTOF10: 7
PAINLEVEL_OUTOF10: 6
PAINLEVEL_OUTOF10: 0
PAINLEVEL_OUTOF10: 8
PAINLEVEL_OUTOF10: 8
PAINLEVEL_OUTOF10: 9
PAINLEVEL_OUTOF10: 0
PAINLEVEL_OUTOF10: 0
PAINLEVEL_OUTOF10: 9
PAINLEVEL_OUTOF10: 4
PAINLEVEL_OUTOF10: 9
PAINLEVEL_OUTOF10: 4
PAINLEVEL_OUTOF10: 6
PAINLEVEL_OUTOF10: 9
PAINLEVEL_OUTOF10: 3
PAINLEVEL_OUTOF10: 10
PAINLEVEL_OUTOF10: 7
PAINLEVEL_OUTOF10: 3
PAINLEVEL_OUTOF10: 9
PAINLEVEL_OUTOF10: 0
PAINLEVEL_OUTOF10: 7
PAINLEVEL_OUTOF10: 0
PAINLEVEL_OUTOF10: 0
PAINLEVEL_OUTOF10: 3
PAINLEVEL_OUTOF10: 2
PAINLEVEL_OUTOF10: 0
PAINLEVEL_OUTOF10: 9
PAINLEVEL_OUTOF10: 0
PAINLEVEL_OUTOF10: 3
PAINLEVEL_OUTOF10: 0
PAINLEVEL_OUTOF10: 6
PAINLEVEL_OUTOF10: 4
PAINLEVEL_OUTOF10: 0
PAINLEVEL_OUTOF10: 6
PAINLEVEL_OUTOF10: 7
PAINLEVEL_OUTOF10: 6
PAINLEVEL_OUTOF10: 6
PAINLEVEL_OUTOF10: 7

## 2020-01-01 ASSESSMENT — PAIN DESCRIPTION - PAIN TYPE
TYPE: ACUTE PAIN
TYPE: SURGICAL PAIN
TYPE: ACUTE PAIN
TYPE: SURGICAL PAIN
TYPE: CHRONIC PAIN;ACUTE PAIN
TYPE: SURGICAL PAIN
TYPE: SURGICAL PAIN;ACUTE PAIN
TYPE: ACUTE PAIN;SURGICAL PAIN
TYPE: ACUTE PAIN
TYPE: SURGICAL PAIN
TYPE: ACUTE PAIN;SURGICAL PAIN
TYPE: ACUTE PAIN
TYPE: CHRONIC PAIN
TYPE: ACUTE PAIN;SURGICAL PAIN
TYPE: ACUTE PAIN;SURGICAL PAIN
TYPE: CHRONIC PAIN
TYPE: ACUTE PAIN
TYPE: ACUTE PAIN;SURGICAL PAIN
TYPE: ACUTE PAIN
TYPE: ACUTE PAIN
TYPE: ACUTE PAIN;SURGICAL PAIN
TYPE: ACUTE PAIN
TYPE: SURGICAL PAIN
TYPE: ACUTE PAIN
TYPE: ACUTE PAIN

## 2020-01-01 ASSESSMENT — COPD QUESTIONNAIRES: CAT_SEVERITY: MODERATE

## 2020-02-29 PROBLEM — R07.89 RIGHT-SIDED CHEST WALL PAIN: Status: ACTIVE | Noted: 2019-01-01

## 2020-02-29 PROBLEM — C22.0 HEPATOCELLULAR CARCINOMA (HCC): Status: ACTIVE | Noted: 2020-01-01

## 2020-02-29 PROBLEM — R74.8 ABNORMAL TRANSAMINASES: Status: ACTIVE | Noted: 2020-01-01

## 2020-02-29 PROBLEM — K83.09 CHOLANGITIS: Status: ACTIVE | Noted: 2020-01-01

## 2020-02-29 NOTE — ED PROVIDER NOTES
As PA-in-triage, I performed a medical screening history and physical exam on this patient. HISTORY OF PRESENT ILLNESS  Josefa Silvestre is a 64 y.o. female presents with family for generalized illness, dizziness and fall with head injury. Has a history of liver cancer, follows with the [facility]. Reports was just discharged from [facility] on Sunday following a heart cath and began feeling generally weak and fatigue with fevers, cough and had a fall, hitting her head and right chest wall. No urinary symptoms. No known sick contacts. PHYSICAL EXAM  LMP 01/03/2012     On exam, the patient appears well-hydrated, well-nourished, chronically ill appearing. Mucous membranes are moist. Speech is clear. Breathing is unlabored. Skin is dry. Mental status is normal. The patient has moves all extremities, and is without facial droop. Labs, imaging and EKG ordered at triage. Vital signs pending at time of my triage evaluation. Please see ED provider's note for patient complete ED evaluation, labs/imaging interpretation and final disposition/clinical impression.          Regina Oseguera PA-C  02/29/20 [id number]

## 2020-02-29 NOTE — ED PROVIDER NOTES
Emergency Department Encounter    Patient: Salvador Garrison  MRN: [id number]  : 1960  Date of Evaluation: 2020  ED Provider:  Milind Strickland    Triage Chief Complaint:   Fall; Dizziness; Emesis; and Rib Pain (injury) (left side)    [employer]:  Salvador Garrison is a 64 y.o. female that presents with generalized fatigue. Patient also did have a fall.  4 days ago patient was seen at [facility] where she had a catheterization done which was \"clean\". She also afterwards went to urgent care where she was told she likely had influenza but no testing was performed at that time. Was started on Tamiflu. The next day the patient was getting out of bed and began feeling dizzy with a spinning sensation and lightheadedness. She did fall on her right side and has since had chest pain on that side. Pain is sharp and is localized to the right lateral side. She also does have pain in her abdomen primarily in the epigastric area. This pain has worsened. Since that time patient has had dizziness including lightheadedness and spinning sensation primarily with standing up quickly and with moving. She states the symptoms come on quickly and resolve after a few minutes. She also has had generalized fatigue and body aches for the last 4 days. She states that her symptoms have not improved despite taking Tamiflu. She states she does have mild shortness of breath but states that she does have hepatocellular carcinoma and is undergoing radiation treatment and this is unchanged from her baseline. She denies any fevers, chills, nausea, vomiting, dysuria, hematuria, change in bowel movements, or other symptoms at this time.     ROS - see HPI, below listed is current ROS at time of my eval:  General:  No fevers, no chills, no weakness  Eyes:  No recent vison changes, no discharge  ENT:  No sore throat, no nasal congestion, no hearing changes  Cardiovascular:  No chest pain, no palpitations  Respiratory:  No shortness of breath, no cough, no wheezing  Gastrointestinal:  No pain, no nausea, no vomiting, no diarrhea  Musculoskeletal:  No muscle pain, no joint pain  Skin:  No rash, no pruritis, no easy bruising  Neurologic:  No speech problems, no headache, no extremity numbness, no extremity tingling, no extremity weakness  Psychiatric:  No anxiety  Genitourinary:  No dysuria, no hematuria  Endocrine:  No unexpected weight gain, no unexpected weight loss  Extremities:  no edema, no pain    Past Medical History:   Diagnosis Date    Anxiety     Asthma     Cancer ([address]     breast(left) cancer- dx 10/2015- tx with radiation- following with Dr Jacob Garcia of liver, [facility])     liver    CHF (congestive heart failure) ([address]     Chronic ulcer of right thigh with fat layer exposed ([address]     COPD (chronic obstructive pulmonary disease) ([address]     follow with Dr Yaz Manzanares Diabetes mellitus ([address]     dx10+ yrs ago- follows with Dr Emi Cortes    Diabetes mellitus with skin ulcer ([address]     History of kidney stones     \"last one 3/2016- passed it- have had stones off and on for past 5 yrs follow with Dr Martin Gannon Hypertension     Liver cirrhosis ([address]     for liver bx 7/10/2017    Morbid obesity ([address] 10/20/2015    PT TOO LARGE FOR MRI LEFT SHOULDER    On home oxygen therapy     oxygen at 4l/nc at hs    PONV (postoperative nausea and vomiting)     \"did get sick with the breast surgery\"    Sleep apnea     \"had sleep study several yrs ago- c-pap  does use it\"    Thyroid disease     WD-Local infection of skin and subcutaneous tissue 12/8/2017    WD-Ulcer of abdomen wall, limited to breakdown of skin ([address]     WD-Unspecified open wound of abdominal wall, right lower quadrant without penetration into peritoneal cavity, initial encounter      Past Surgical History:   Procedure Laterality Date    BREAST SURGERY Left 2015    EXCISION MASS\"lumpectomy- took out 3 lymph nodes all ok\"    CARDIAC CATHETERIZATION  01/21/2020 no intervention    CYSTOSCOPY Left 13    stent placement    LIVER BIOPSY  07/10/2017    LIVER SURGERY      TACE procedure in Aug 2019   Doctor [facility] [address]    \"took most of the thyroid out\"    UPPER GASTROINTESTINAL ENDOSCOPY       Family History   Problem Relation Age of Onset   [facility] Cancer Mother         breast    Diabetes Mother     Heart Disease Father         MI    Diabetes Father     Other Sister         cirhosis    Heart Disease Sister     Other Brother         cirhosis    Kidney Disease Brother     Asthma Sister         COPD    Cancer Brother         liver cancer     Social History     Socioeconomic History    Marital status:      Spouse name: Not on file    Number of children: Not on file    Years of education: Not on file    Highest education level: Not on file   Occupational History    Not on file   Social Needs    Financial resource strain: Not on file    Food insecurity:     Worry: Not on file     Inability: Not on file    Transportation needs:     Medical: Not on file     Non-medical: Not on file   Tobacco Use    Smoking status: Former Smoker     Packs/day: 1.50     Years: 20.00     Pack years: 30.00     Last attempt to quit: 3/23/2005     Years since quittin.9    Smokeless tobacco: Never Used   Substance and Sexual Activity    Alcohol use: No     Alcohol/week: 0.0 standard drinks    Drug use: No    Sexual activity: Yes     Partners: Male   Lifestyle    Physical activity:     Days per week: Not on file     Minutes per session: Not on file    Stress: Not on file   Relationships    Social connections:     Talks on phone: Not on file     Gets together: Not on file     Attends Jewish service: Not on file     Active member of club or organization: Not on file     Attends meetings of clubs or organizations: Not on file     Relationship status: Not on file    Intimate partner violence:     Fear of current or ex partner: Not on file     Emotionally abused: Not on file     Physically abused: Not on file     Forced sexual activity: Not on file   Other Topics Concern    Not on file   Social History Narrative    Not on file     Current Facility-Administered Medications   Medication Dose Route Frequency Provider Last Rate Last Dose    morphine sulfate (PF) injection 4 mg  4 mg Intravenous Q30 Min PRN Kolton Doctor, DO         Current Outpatient Medications   Medication Sig Dispense Refill    ferrous sulfate (SUKHI-PENNY) 325 (65 Fe) MG tablet Take 1 tablet by mouth every other day 60 tablet 0    insulin glargine (LANTUS) 100 UNIT/ML injection vial Inject 50 Units into the skin nightly -stop when you are off the prednisone 1 vial 3    insulin lispro (HUMALOG) 100 UNIT/ML injection vial Inject 0-30 Units into the skin 3 times daily (with meals) Stop when you are off the prednisone 1 vial 0    insulin lispro (HUMALOG) 100 UNIT/ML injection vial Inject 25 Units into the skin 3 times daily (with meals) Stop when you are off the prednisone 1 vial 3    metFORMIN (GLUCOPHAGE) 500 MG tablet   3    gabapentin (NEURONTIN) 300 MG capsule Take 300 mg by mouth 3 times daily as needed for Pain. 3    torsemide (DEMADEX) 20 MG tablet Take 1 tablet by mouth daily 90 tablet 1    spironolactone (ALDACTONE) 50 MG tablet Take 100 mg by mouth 2 times daily       ipratropium-albuterol (DUONEB) 0.5-2.5 (3) MG/3ML SOLN nebulizer solution Inhale 3 mLs into the lungs every 6 hours 120 vial 5    budesonide-formoterol (SYMBICORT) 160-4.5 MCG/ACT AERO Inhale 2 puffs into the lungs 2 times daily 1 Inhaler 5    exemestane (AROMASIN) 25 MG chemo tablet Take 25 mg by mouth daily      hydrOXYzine (ATARAX) 50 MG tablet Take 100 mg by mouth nightly   2    zolpidem (AMBIEN) 10 MG tablet Take by mouth nightly. .      CPAP Machine MISC 10 cm by Does not apply route nightly.  OXYGEN Inhale 2 L into the lungs nightly.       exenatide (BYDUREON) 2 MG SRER injection Inject 2 mg into the skin once a week.      simvastatin (ZOCOR) 20 MG tablet Take 20 mg by mouth nightly.  levothyroxine (SYNTHROID) 200 MCG tablet Take 200 mcg by mouth Daily.  glimepiride (AMARYL) 4 MG tablet Take 4 mg by mouth 2 times daily.  isosorbide mononitrate (IMDUR) 30 MG CR tablet Take 30 mg by mouth nightly       cloNIDine (CATAPRES) 0.2 MG tablet Take 0.2 mg by mouth 3 times daily. Allergies   Allergen Reactions    Lasix [Furosemide] Hives    Sulfa Antibiotics Rash    Nystatin Other (See Comments)     Pt reports \"burning\" sensation to skin    Tape Quin Mean Tape]        Nursing Notes Reviewed    Physical Exam:  Triage VS:    ED Triage Vitals   Enc Vitals Group      BP 02/29/20 1548 127/71      Pulse 02/29/20 1548 108      Resp 02/29/20 1548 22      Temp 02/29/20 1548 98.1 °F (36.7 °C)      Temp Source 02/29/20 1642 Oral      SpO2 02/29/20 1548 97 %      Weight 02/29/20 1548 297 lb (134.7 kg)      Height 02/29/20 1548 5' 3\" (1.6 m)      Head Circumference --       Peak Flow --       Pain Score --       Pain Loc --       Pain Edu? --       Excl. in [address]? --        My pulse ox interpretation is - normal    General appearance:  No acute distress. Skin:  Warm. Dry. Eye:  Extraocular movements intact. Ears, nose, mouth and throat:  Oral mucosa moist   Neck:  Trachea midline. Extremity:  No swelling. Normal ROM     Heart: Tachycardic with regular rhythm, normal S1 & S2, no extra heart sounds. Perfusion:  intact  Respiratory:  Lungs clear to auscultation bilaterally. Respirations nonlabored. There is tenderness to palpation of the right side of her chest wall laterally. Abdominal:  Normal bowel sounds. Soft. Tenderness to the epigastric area and right upper quadrant. Non distended. Back:  No CVA tenderness to palpation     Neurological:  Alert and oriented times 3. No focal neuro deficits.              Psychiatric:  Appropriate    I have reviewed and interpreted all of the currently available lab results from this visit (if applicable):  Results for orders placed or performed during the hospital encounter of 02/29/20   Rapid influenza A/B antigens   Result Value Ref Range    Rapid Influenza A Ag NEGATIVE NEGATIVE    Rapid Influenza B Ag NEGATIVE NEGATIVE   CBC auto diff   Result Value Ref Range    WBC 14.0 (H) 4.0 - 10.5 K/CU MM    RBC 4.54 4.2 - 5.4 M/CU MM    Hemoglobin 12.4 (L) 12.5 - 16.0 GM/DL    Hematocrit 39.2 37 - 47 %    MCV 86.3 78 - 100 FL    MCH 27.3 27 - 31 PG    MCHC 31.6 (L) 32.0 - 36.0 %    RDW 15.7 (H) 11.7 - 14.9 %    Platelets 88 (L) 406 - 440 K/CU MM    MPV 11.8 (H) 7.5 - 11.1 FL    Differential Type AUTOMATED DIFFERENTIAL     Segs Relative 82.6 (H) 36 - 66 %    Lymphocytes % 3.4 (L) 24 - 44 %    Monocytes % 11.4 (H) 0 - 4 %    Eosinophils % 0.1 0 - 3 %    Basophils % 0.3 0 - 1 %    Segs Absolute 11.6 K/CU MM    Lymphocytes Absolute 0.5 K/CU MM    Monocytes Absolute 1.6 K/CU MM    Eosinophils Absolute 0.0 K/CU MM    Basophils Absolute 0.0 K/CU MM    Nucleated RBC % 0.2 %    Total Nucleated RBC 0.0 K/CU MM    Total Immature Neutrophil 0.31 K/CU MM    Immature Neutrophil % 2.2 (H) 0 - 0.43 %   SPECIMEN REJECTION   Result Value Ref Range    Rejected Test CHEM     Reason for Rejection UNABLE TO PERFORM TESTING:     Reason for Rejection SPECIMEN HEMOLYZED    SPECIMEN REJECTION   Result Value Ref Range    Rejected Test CHEM     Reason for Rejection UNABLE TO PERFORM TESTING:     Reason for Rejection SPECIMEN HEMOLYZED    Comprehensive Metabolic Panel   Result Value Ref Range    Sodium 131 (L) 135 - 145 MMOL/L    Potassium 5.4 (H) 3.5 - 5.1 MMOL/L    Chloride 93 (L) 99 - 110 mMol/L    CO2 24 21 - 32 MMOL/L    BUN 30 (H) 6 - 23 MG/DL    CREATININE 1.2 (H) 0.6 - 1.1 MG/DL    Glucose 162 (H) 70 - 99 MG/DL    Calcium 8.5 8.3 - 10.6 MG/DL    Alb 2.5 (L) 3.4 - 5.0 GM/DL    Total Protein 7.0 6.4 - 8.2 GM/DL    Total Bilirubin 1.8 (H) 0.0 - 1.0 MG/DL    ALT 93 (H) 10 - 40 U/L     (H) 15 - 37 IU/L    Alkaline Phosphatase 168 (H) 40 - 129 IU/L    GFR Non- 46 (L) >60 mL/min/1.73m2    GFR  56 (L) >60 mL/min/1.73m2    Anion Gap 14 4 - 16   Brain Natriuretic Peptide   Result Value Ref Range    Pro-BNP 2,941 (H) <300 PG/ML   Troponin   Result Value Ref Range    Troponin T <0.010 <0.01 NG/ML   EKG 12 Lead   Result Value Ref Range    Ventricular Rate 108 BPM    Atrial Rate 108 BPM    P-R Interval 146 ms    QRS Duration 82 ms    Q-T Interval 358 ms    QTc Calculation (Bazett) 479 ms    P Axis 88 degrees    R Axis -10 degrees    T Axis 33 degrees    Diagnosis       Sinus tachycardia with premature supraventricular complexes  Possible Anterior infarct , age undetermined  Abnormal ECG  When compared with ECG of 26-OCT-2019 15:55,  premature supraventricular complexes are now present        Radiographs (if obtained):  Radiologist's Report Reviewed:  Xr Chest Standard (2 Vw)    Result Date: 2/29/2020  EXAMINATION: TWO XRAY VIEWS OF THE CHEST 2/29/2020 3:50 pm COMPARISON: 10/26/2019 HISTORY: ORDERING SYSTEM PROVIDED HISTORY: fall, right rib pain TECHNOLOGIST PROVIDED HISTORY: Reason for exam:->fall, right rib pain Reason for Exam: sob Acuity: Acute Type of Exam: Initial Additional signs and symptoms: dizziness FINDINGS: The lungs are without acute focal process. There is no effusion or pneumothorax. The cardiomediastinal silhouette is stable. Fracture of the right 7th rib of uncertain age.      Stable cardiomegaly Right 7th rib fracture of uncertain age     Ct Head Wo Contrast    Result Date: 2/29/2020  EXAMINATION: CT OF THE HEAD WITHOUT CONTRAST; CT OF THE CERVICAL SPINE WITHOUT CONTRAST 2/29/2020 3:36 pm TECHNIQUE: CT of the head was performed without the administration of intravenous contrast. Dose modulation, iterative reconstruction, and/or weight based adjustment of the mA/kV was utilized to reduce the radiation dose to as low as reasonably achievable.; CT of the cervical spine and/or weight based adjustment of the mA/kV was utilized to reduce the radiation dose to as low as reasonably achievable.; CT of the cervical spine was performed without the administration of intravenous contrast. Multiplanar reformatted images are provided for review. Dose modulation, iterative reconstruction, and/or weight based adjustment of the mA/kV was utilized to reduce the radiation dose to as low as reasonably achievable. COMPARISON: None. HISTORY: ORDERING SYSTEM PROVIDED HISTORY: head pain TECHNOLOGIST PROVIDED HISTORY: Has a \"code stroke\" or \"stroke alert\" been called? ->No Reason for exam:->head pain Reason for Exam: fall out of bed, upper neck pain Acuity: Acute; ORDERING SYSTEM PROVIDED HISTORY: fall trauma TECHNOLOGIST PROVIDED HISTORY: Reason for exam:->fall trauma Reason for Exam: fall out of bed, posterior head pain Acuity: Acute FINDINGS: CT head: BRAIN/VENTRICLES: There is no acute intracranial hemorrhage, mass effect or midline shift. No abnormal extra-axial fluid collection. The gray-white differentiation is maintained without evidence of an acute infarct. There is no evidence of hydrocephalus. ORBITS: The visualized portion of the orbits demonstrate no acute abnormality. SINUSES: The visualized paranasal sinuses and mastoid air cells demonstrate no acute abnormality. SOFT TISSUES/SKULL: No acute abnormality of the visualized skull or soft tissues. CT cervical spine: BONES/ALIGNMENT: There is no acute fracture or traumatic malalignment. DEGENERATIVE CHANGES: Mild multilevel degenerative disc disease and facet osteoarthritis. SOFT TISSUES: There is no prevertebral soft tissue swelling. CT head: No evidence for acute intracranial pathology. CT cervical spine: No acute abnormality of the cervical spine. Mild multilevel degenerative changes.        EKG (if obtained): (All EKG's are interpreted by myself in the absence of a cardiologist)      MDM:  Patient was evaluated for her generalized fatigue, abdominal pain, near syncope. Evaluation consisted of an EKG which was reassuring. Due to the patient's fall CT scan of her head and cervical spine were performed which did not show any acute abnormalities. Chest x-ray did show a right rib fracture. Laboratory work was performed as above which showed a normal troponin, elevated proBNP of 2900 significantly elevated from prior evaluation. CMP showed hyponatremia, hyperkalemia, hypochloremia, elevated creatinine, and elevated liver enzymes in an obstructive pattern. CBC showed a white blood cell count of 14 and platelets were 88. Vital signs did show hypertension and tachycardia. Right upper quadrant ultrasound was ordered and is currently pending. She did receive IV fluids and pain medication which did improve her symptoms. Patient was signed out to Dr. Marlene Rosenbaum. Case was discussed with him and I did discuss the patient likely needs to be transferred to [facility] for further evaluation of her elevated liver enzymes in the setting of hepatic cellular carcinoma and worsening lab work generally. Clinical Impression:  1. Fatigue, unspecified type    2. Dizziness    3. Elevated liver enzymes    4. Elevated brain natriuretic peptide (BNP) level    5. Hyperkalemia      Disposition referral (if applicable):  No follow-up provider specified. Disposition medications (if applicable):  New Prescriptions    No medications on file     ED Provider Disposition Time  DISPOSITION        Comment: Please note this report has been produced using speech recognition software and may contain errors related to that system including errors in grammar, punctuation, and spelling, as well as words and phrases that may be inappropriate. Efforts were made to edit the dictations.      Casi Collazo DO  02/29/20 1522

## 2020-02-29 NOTE — ED TRIAGE NOTES
Pt had cardiac cath scheduled at [facility] on 1/21/2020. Pt states that cath was clean and no intervention was needed. Pt states that she began to feel ill on Tuesday (4 days after cath). Pt states that she was seen at urgent care and told she likely has the flu but was not tested. Pt states that her symptoms have not improved so she is her today. Pt c/o dizziness (like the room is spinning), generalized body aches and fatigue. Pt also c/o SOB. Pt states that she has liver cancer and is receiving radiation treatment at [facility]. Pt is A&Ox4.

## 2020-03-01 NOTE — ED NOTES
Pt resting in a position of comfort. No needs identified at this time. Bed in lowest position and call light within reach. Respirations even, no distress noted.      Yeisno White RN  02/29/20 1944

## 2020-03-01 NOTE — PLAN OF CARE
Problem: Falls - Risk of:  Goal: Will remain free from falls  Outcome: Ongoing  Goal: Absence of physical injury  Outcome: Ongoing

## 2020-03-01 NOTE — PROGRESS NOTES
Hospitalist Progress Note      Name:  Bola Feldman /Age/Sex: 1960  (64 y.o. female)   MRN & CSN:  [id number] & [id number] Admission Date/Time: 2020  3:52 PM   Location:  [address] [place] [address] PCP: Khalif Barry MD         Hospital Day: 2    Assessment and Plan:   Bola Feldman is a 64 y.o.  female  who presents with Abnormal transaminases    1. Chest wall pain, right: Due to fall. Has right rib fracture of uncertain age. Pain control. 2. Transaminasemia and hyperbilirubinemia: Could be related to recent viral infection. Ultrasound did not show biliary obstruction or dilated gallbladder. Keep n.p.o. for possible ERCP. GI on consult. 3. Recent influenza: Completed 5 days of Tamiflu. Still with cough and myalgia. Respiratory PCR pending. 4. COPD not in exacerbation: Continue breathing treatment  5. Obstructive sleep apnea on CPAP  6. Chronic respiratory failure:  7. Type 2 diabetes: Sliding scale. Hypoglycemia protocol. Hold oral hypoglycemic agents. 8. Congestive heart failure, diastolic, chronic: Diuretics were held because of acute kidney injury and hyperkalemia. 9. Acute kidney injury: Creatinine 1.2 on admission. Now 1.1. Resolved. 10.  hyperkalemia: Resolved  11. Hyponatremia: Sodium 129.  12. History of breast cancer: Continue Aromasin  13. Hypothyroidism: Continue Synthroid   14. Morbid obesity:  15. Liver cirrhosis due to nonalcoholic fatty liver disease  16. Hepatocellular carcinoma status post TACE    Diet Diet NPO Effective Now Exceptions are: Ice Chips, Sips with Meds   DVT Prophylaxis [] Lovenox, []  Heparin, [x] SCDs, [] Warfarin  [] NOAC     GI Prophylaxis [] PPI,  [] H2 Blocker,  [] Carafate,  [x] Diet/Tube Feeds   Code Status Full Code   MDM [] Low, [x] Moderate,[]  High     History of Present Illness:     Chief Complaint: Abnormal transaminases    Seen and examined today. Still with pain on the right chest.  No nausea some shortness of breath. Denied chest pain. PRN  sodium chloride flush, 10 mL, PRN  acetaminophen, 650 mg, Q6H PRN    Or  acetaminophen, 650 mg, Q6H PRN  polyethylene glycol, 17 g, Daily PRN  promethazine, 12.5 mg, Q6H PRN    Or  ondansetron, 4 mg, Q6H PRN  oxyCODONE, 5 mg, Q4H PRN  HYDROmorphone, 0.25 mg, Q4H PRN  glucose, 15 g, PRN  dextrose, 12.5 g, PRN  glucagon (rDNA), 1 mg, PRN  dextrose, 100 mL/hr, PRN        Recent Labs     02/29/20  1709 03/01/20  0609   WBC 14.0* 13.6*   HGB 12.4* 11.4*   HCT 39.2 36.1*   PLT 88* 94*      Recent Labs     02/29/20  1740 03/01/20  0609   * 129*   K 5.4* 4.5   CL 93* 97*   CO2 24 21   BUN 30* 36*   CREATININE 1.2* 1.1     Recent Labs     02/29/20  1740 03/01/20  0609   * 107*   ALT 93* 77*   BILITOT 1.8* 1.6*   ALKPHOS 168* 143*     No results for input(s): INR in the last 72 hours.   Recent Labs     02/29/20 1740   TROPONINT <0.010      Imaging reviewed    Electronically signed by Lisa Cartagena MD on 3/1/2020 at 11:21 AM

## 2020-03-01 NOTE — ED NOTES
Narrative   EXAMINATION:   RIGHT UPPER QUADRANT ULTRASOUND       2/29/2020 7:07 pm       COMPARISON:   CT abdomen and pelvis 06/12/2018.       HISTORY:   ORDERING SYSTEM PROVIDED HISTORY: abdominal pain   TECHNOLOGIST PROVIDED HISTORY:   Reason for exam:->abdominal pain   Reason for Exam: abdominal pain   Acuity: Acute   Type of Exam: Initial       FINDINGS:   Somewhat limited exam secondary to patient body habitus.  Patient body   habitus also prevented decubitus imaging as the patient was unable to roll.    Within these limitations:       LIVER: Cirrhotic liver morphology.  No focal liver lesions are seen.  No   intrahepatic biliary dilatation identified.       BILIARY SYSTEM:  Suboptimal visualization of the gallbladder.  Gallbladder   appears under distended.  There appears to be some wall thickening with   gallbladder wall measuring up to approximately 6 mm, although this may relate   to the limitations of the exam, the suboptimal visualization of the   gallbladder and the under distended configuration of the gallbladder.  No   stones are identified.  Negative sonographic Olsen's sign.       Common bile duct is mildly distended measuring 7 mm.  No definite intraductal   abnormality identified.       RIGHT KIDNEY: The right kidney is grossly unremarkable without evidence of   hydronephrosis.       PANCREAS:  Visualized portions of the pancreas are unremarkable.       OTHER: No evidence of right upper quadrant ascites.           Impression   Limited exam for reasons described above.       There may be some wall thickening of the gallbladder although this may just   relate to suboptimal visualization of the gallbladder and/or under distention   of the gallbladder.  No other gallbladder pathology identified.  No   convincing sonographic findings for acute cholecystitis.  If there is   persistent clinical concern consider nuclear medicine study.       Mildly prominent common bile duct measuring 7 mm without obvious etiology or   definite intraductal abnormality noted.       Cirrhotic liver morphology without focal liver lesion identified.         Annie Brito RN  02/29/20 2041

## 2020-03-01 NOTE — ED NOTES
Narrative   EXAMINATION:   RIGHT UPPER QUADRANT ULTRASOUND       2/29/2020 7:07 pm       COMPARISON:   CT abdomen and pelvis 06/12/2018.       HISTORY:   ORDERING SYSTEM PROVIDED HISTORY: abdominal pain   TECHNOLOGIST PROVIDED HISTORY:   Reason for exam:->abdominal pain   Reason for Exam: abdominal pain   Acuity: Acute   Type of Exam: Initial       FINDINGS:   Somewhat limited exam secondary to patient body habitus.  Patient body   habitus also prevented decubitus imaging as the patient was unable to roll.    Within these limitations:       LIVER: Cirrhotic liver morphology.  No focal liver lesions are seen.  No   intrahepatic biliary dilatation identified.       BILIARY SYSTEM:  Suboptimal visualization of the gallbladder.  Gallbladder   appears under distended.  There appears to be some wall thickening with   gallbladder wall measuring up to approximately 6 mm, although this may relate   to the limitations of the exam, the suboptimal visualization of the   gallbladder and the under distended configuration of the gallbladder.  No   stones are identified.  Negative sonographic Olsen's sign.       Common bile duct is mildly distended measuring 7 mm.  No definite intraductal   abnormality identified.       RIGHT KIDNEY: The right kidney is grossly unremarkable without evidence of   hydronephrosis.       PANCREAS:  Visualized portions of the pancreas are unremarkable.       OTHER: No evidence of right upper quadrant ascites.           Impression   Limited exam for reasons described above.       There may be some wall thickening of the gallbladder although this may just   relate to suboptimal visualization of the gallbladder and/or under distention   of the gallbladder.  No other gallbladder pathology identified.  No   convincing sonographic findings for acute cholecystitis.  If there is   persistent clinical concern consider nuclear medicine study.       Mildly prominent common bile duct measuring 7 mm without obvious etiology or   definite intraductal abnormality noted.       Cirrhotic liver morphology without focal liver lesion identified.         Zen Trejo RN  02/29/20 2021

## 2020-03-01 NOTE — H&P
[facility]ist History & Physical      Name: Antonio Pizarro    PCP: Liza Lester MD    Date of Admission: 2/29/2020    Admitting Physician: Geoff Meier MD Hospitalist    Date of Service: Pt seen/examined on 2/29/2020     Chief Complaint:  Fall; Dizziness; Emesis; and Right side pain      History Of Present Illness: The patient is a 64 y.o. female with morbid obesity, liver cirrhosis due to NAFLD, [address] s/p transarterial chemoembolization on 7/16/2019 and 1/17/2020, COPD on home O2, SAEID on CPAP, who presented to ED with right upper quadrant pain and N/V for 4 days. Since she had TACE, She is undergoing pre-transplant evaluation at [facility]. Her tumor was responding to TACE on MRI, and her LFT on 1/27 at [facility] was normal.   She had screening heart cath a week ago. Then a few days later, she started having nausea, headache, bodyaches, dizziness, feeling hot/cold. She thought she had cold or flu. She was given tamiflu empirically at Urgent care clinic about 4 days ago. She has not noticed improvement with tamiflu. She says she was not tested for flu. She fell 2 days ago, feeling weak and dizzy. She fell on right side, and think she hit her ribs. Since then she's had right side trunk pain. The pain is continuous, sharp, rating 9 out of 10, worse on movement and deep breathing. She has not taken pain meds at home. She says she has h/o bilateral rib fractures, 2 on each side, a few years back related to a fall. This morning, she had emesis of yellow/brown liquid once, and has not vomited since then. She had regular BM today. She has poor appetite and has not eaten since she's been sick. In ED, she had lab that showed elevated AST 93, , Jamil 1.8, . WBC was elevated to 14.0 with left shift. US showed no focal liver lesions, no distention of gallbladder, negative sonographic Olsen sign, CBD mildly dilated without intraductal dilatation, mildly dilated CBD, no ascites.       She is being admitted for further evaluation and management. Dr. Harriet Mike was consulted from ED. The case was discussed with the ED provider. Past Medical History Reviewed:    Patient  has a past medical history of Anxiety, Asthma, Cancer ([address] Cancer of liver, primary ([address] CHF (congestive heart failure) ([address] Chronic ulcer of right thigh with fat layer exposed ([address] COPD (chronic obstructive pulmonary disease) ([address] Diabetes mellitus ([address] Diabetes mellitus with skin ulcer ([address] History of kidney stones, Hypertension, Liver cirrhosis ([address] Morbid obesity ([address] On home oxygen therapy, PONV (postoperative nausea and vomiting), Sleep apnea, Thyroid disease, WD-Local infection of skin and subcutaneous tissue, WD-Ulcer of abdomen wall, limited to breakdown of skin ([address] and WD-Unspecified open wound of abdominal wall, right lower quadrant without penetration into peritoneal cavity, initial encounter. Past Surgical History Reviewed:    Patient  has a past surgical history that includes Thyroidectomy (1990); Cystoscopy (Left, 12/23/13); Breast surgery (Left, 2015); Upper gastrointestinal endoscopy; liver biopsy (07/10/2017); Liver surgery; and Cardiac catheterization (01/21/2020). Medications Prior to Admission Reviewed:    Prior to Admission medications    Medication Sig Start Date End Date Taking?  Authorizing Provider   ferrous sulfate (SUKHI-PENNY) 325 (65 Fe) MG tablet Take 1 tablet by mouth every other day 10/28/19   Vidhya Kline MD   insulin glargine (LANTUS) 100 UNIT/ML injection vial Inject 50 Units into the skin nightly -stop when you are off the prednisone 10/28/19   Vidhya Kline MD   insulin lispro (HUMALOG) 100 UNIT/ML injection vial Inject 0-30 Units into the skin 3 times daily (with meals) Stop when you are off the prednisone 10/29/19   Vidhya Kline MD   insulin lispro (HUMALOG) 100 UNIT/ML injection vial Inject 25 Units into the skin 3 times daily (with meals) Stop when you are off the prednisone 10/29/19   Mignon Chaparro MD   metFORMIN (GLUCOPHAGE) 500 MG tablet  9/24/19   Historical Provider, MD   gabapentin (NEURONTIN) 300 MG capsule Take 300 mg by mouth 3 times daily as needed for Pain. 9/24/19   Historical Provider, MD   torsemide (DEMADEX) 20 MG tablet Take 1 tablet by mouth daily 9/9/18   David Ulloa MD   spironolactone (ALDACTONE) 50 MG tablet Take 100 mg by mouth 2 times daily     Historical Provider, MD   ipratropium-albuterol (DUONEB) 0.5-2.5 (3) MG/3ML SOLN nebulizer solution Inhale 3 mLs into the lungs every 6 hours 6/19/17   Juno Olivia MD   budesonide-formoterol [facility]) 160-4.5 MCG/ACT AERO Inhale 2 puffs into the lungs 2 times daily 2/20/17   Juno Olivia MD   exemestane (AROMASIN) 25 MG chemo tablet Take 25 mg by mouth daily    Historical Provider, MD   hydrOXYzine (ATARAX) 50 MG tablet Take 100 mg by mouth nightly  10/17/15   Historical Provider, MD   zolpidem (AMBIEN) 10 MG tablet Take by mouth nightly. Mark Warren Historical Provider, MD   CPAP Machine MISC 10 cm by Does not apply route nightly. Historical Provider, MD   OXYGEN Inhale 2 L into the lungs nightly. Historical Provider, MD   exenatide (BYDUREON) 2 MG SRER injection Inject 2 mg into the skin once a week. Historical Provider, MD   simvastatin (ZOCOR) 20 MG tablet Take 20 mg by mouth nightly. Historical Provider, MD   levothyroxine (SYNTHROID) 200 MCG tablet Take 200 mcg by mouth Daily. Historical Provider, MD   glimepiride (AMARYL) 4 MG tablet Take 4 mg by mouth 2 times daily. Historical Provider, MD   isosorbide mononitrate (IMDUR) 30 MG CR tablet Take 30 mg by mouth nightly     Historical Provider, MD   cloNIDine (CATAPRES) 0.2 MG tablet Take 0.2 mg by mouth 3 times daily. Historical Provider, MD       Allergies:  Lasix [furosemide]; Sulfa antibiotics; Nystatin; and Tape [adhesive tape]    Social History:    TOBACCO:   reports that she quit smoking about 14 years ago. Data  (4-points for high level)  Laboratory this visit:  Reviewed    Radiology this visit:  Personally reviewed the following imaging films, and agree with the radiologist readings. Xr Chest Standard (2 Vw)    Result Date: 2/29/2020  EXAMINATION: TWO XRAY VIEWS OF THE CHEST 2/29/2020 3:50 pm COMPARISON: 10/26/2019 HISTORY: ORDERING SYSTEM PROVIDED HISTORY: fall, right rib pain TECHNOLOGIST PROVIDED HISTORY: Reason for exam:->fall, right rib pain Reason for Exam: sob Acuity: Acute Type of Exam: Initial Additional signs and symptoms: dizziness FINDINGS: The lungs are without acute focal process. There is no effusion or pneumothorax. The cardiomediastinal silhouette is stable. Fracture of the right 7th rib of uncertain age. Stable cardiomegaly Right 7th rib fracture of uncertain age     Ct Head Wo Contrast    Result Date: 2/29/2020  EXAMINATION: CT OF THE HEAD WITHOUT CONTRAST; CT OF THE CERVICAL SPINE WITHOUT CONTRAST 2/29/2020 3:36 pm TECHNIQUE: CT of the head was performed without the administration of intravenous contrast. Dose modulation, iterative reconstruction, and/or weight based adjustment of the mA/kV was utilized to reduce the radiation dose to as low as reasonably achievable.; CT of the cervical spine was performed without the administration of intravenous contrast. Multiplanar reformatted images are provided for review. Dose modulation, iterative reconstruction, and/or weight based adjustment of the mA/kV was utilized to reduce the radiation dose to as low as reasonably achievable. COMPARISON: None. HISTORY: ORDERING SYSTEM PROVIDED HISTORY: head pain TECHNOLOGIST PROVIDED HISTORY: Has a \"code stroke\" or \"stroke alert\" been called? ->No Reason for exam:->head pain Reason for Exam: fall out of bed, upper neck pain Acuity: Acute; ORDERING SYSTEM PROVIDED HISTORY: fall trauma TECHNOLOGIST PROVIDED HISTORY: Reason for exam:->fall trauma Reason for Exam: fall out of bed, posterior head pain Acuity: Acute FINDINGS: CT head: BRAIN/VENTRICLES: There is no acute intracranial hemorrhage, mass effect or midline shift. No abnormal extra-axial fluid collection. The gray-white differentiation is maintained without evidence of an acute infarct. There is no evidence of hydrocephalus. ORBITS: The visualized portion of the orbits demonstrate no acute abnormality. SINUSES: The visualized paranasal sinuses and mastoid air cells demonstrate no acute abnormality. SOFT TISSUES/SKULL: No acute abnormality of the visualized skull or soft tissues. CT cervical spine: BONES/ALIGNMENT: There is no acute fracture or traumatic malalignment. DEGENERATIVE CHANGES: Mild multilevel degenerative disc disease and facet osteoarthritis. SOFT TISSUES: There is no prevertebral soft tissue swelling. CT head: No evidence for acute intracranial pathology. CT cervical spine: No acute abnormality of the cervical spine. Mild multilevel degenerative changes. Ct Cervical Spine Wo Contrast    Result Date: 2/29/2020  EXAMINATION: CT OF THE HEAD WITHOUT CONTRAST; CT OF THE CERVICAL SPINE WITHOUT CONTRAST 2/29/2020 3:36 pm TECHNIQUE: CT of the head was performed without the administration of intravenous contrast. Dose modulation, iterative reconstruction, and/or weight based adjustment of the mA/kV was utilized to reduce the radiation dose to as low as reasonably achievable.; CT of the cervical spine was performed without the administration of intravenous contrast. Multiplanar reformatted images are provided for review. Dose modulation, iterative reconstruction, and/or weight based adjustment of the mA/kV was utilized to reduce the radiation dose to as low as reasonably achievable. COMPARISON: None. HISTORY: ORDERING SYSTEM PROVIDED HISTORY: head pain TECHNOLOGIST PROVIDED HISTORY: Has a \"code stroke\" or \"stroke alert\" been called? ->No Reason for exam:->head pain Reason for Exam: fall out of bed, upper neck pain Acuity: Acute; ORDERING SYSTEM PROVIDED HISTORY: fall trauma TECHNOLOGIST PROVIDED HISTORY: Reason for exam:->fall trauma Reason for Exam: fall out of bed, posterior head pain Acuity: Acute FINDINGS: CT head: BRAIN/VENTRICLES: There is no acute intracranial hemorrhage, mass effect or midline shift. No abnormal extra-axial fluid collection. The gray-white differentiation is maintained without evidence of an acute infarct. There is no evidence of hydrocephalus. ORBITS: The visualized portion of the orbits demonstrate no acute abnormality. SINUSES: The visualized paranasal sinuses and mastoid air cells demonstrate no acute abnormality. SOFT TISSUES/SKULL: No acute abnormality of the visualized skull or soft tissues. CT cervical spine: BONES/ALIGNMENT: There is no acute fracture or traumatic malalignment. DEGENERATIVE CHANGES: Mild multilevel degenerative disc disease and facet osteoarthritis. SOFT TISSUES: There is no prevertebral soft tissue swelling. CT head: No evidence for acute intracranial pathology. CT cervical spine: No acute abnormality of the cervical spine. Mild multilevel degenerative changes. Us Abdomen Limited    Result Date: 2/29/2020  EXAMINATION: RIGHT UPPER QUADRANT ULTRASOUND 2/29/2020 7:07 pm COMPARISON: CT abdomen and pelvis 06/12/2018. HISTORY: ORDERING SYSTEM PROVIDED HISTORY: abdominal pain TECHNOLOGIST PROVIDED HISTORY: Reason for exam:->abdominal pain Reason for Exam: abdominal pain Acuity: Acute Type of Exam: Initial FINDINGS: Somewhat limited exam secondary to patient body habitus. Patient body habitus also prevented decubitus imaging as the patient was unable to roll. Within these limitations: LIVER: Cirrhotic liver morphology. No focal liver lesions are seen. No intrahepatic biliary dilatation identified. BILIARY SYSTEM:  Suboptimal visualization of the gallbladder. Gallbladder appears under distended.   There appears to be some wall thickening with gallbladder wall measuring up to approximately 6 mm, although this may relate to the limitations of the exam, the suboptimal visualization of the gallbladder and the under distended configuration of the gallbladder. No stones are identified. Negative sonographic Olsen's sign. Common bile duct is mildly distended measuring 7 mm. No definite intraductal abnormality identified. RIGHT KIDNEY: The right kidney is grossly unremarkable without evidence of hydronephrosis. PANCREAS:  Visualized portions of the pancreas are unremarkable. OTHER: No evidence of right upper quadrant ascites. Limited exam for reasons described above. There may be some wall thickening of the gallbladder although this may just relate to suboptimal visualization of the gallbladder and/or under distention of the gallbladder. No other gallbladder pathology identified. No convincing sonographic findings for acute cholecystitis. If there is persistent clinical concern consider nuclear medicine study. Mildly prominent common bile duct measuring 7 mm without obvious etiology or definite intraductal abnormality noted. Cirrhotic liver morphology without focal liver lesion identified. EKG this visit:  Personally reviewed EKG and telemetry strip. Documents:  Recent previous records, labs, imaging in the EMR were reviewed. Assessments and plans: Active Problems:    Malignant neoplasm of left female breast ([facility]    Diabetes mellitus with skin ulcer (HCC)    Liver cirrhosis secondary to GALLEGOS (nonalcoholic steatohepatitis) (HCC)    Right-sided chest wall pain    Abnormal transaminases    Hepatocellular carcinoma (HCC)  Resolved Problems:    * No resolved hospital problems. *    · Right sided chest wall pain, likely related to trauma for its onset clearly after the fall. She could have another new rib fracture, although her tenderness is not limited to ribs. No liver contusion or hematoma on ultrasound. Oxycodone and dilaudid ordered for pain control. No NSAIDs for Cr elevated.   No tylenol for cirrhosis. · Elevated transaminase and bilirubin. US does not show biliary obstruction or dilated GB or stone. Suspect either viral etiology or minor liver contusion or medication induced (tamiflu). Dont think it is related to [address] or TACE since LFTs at [facility] after TACE was normal.  Dr. Jorge Sales was consulted by ED physician for poss ERCP. Will keep NPO for now. Hold Tamiflu. Follow CMP daily. · Acute fever/chills, dizziness, bodyaches, nausea, cough for 5 days, likely flu. Pt thinks those symptoms are improving gradually. Will hold tamiflu at this time. Check resp PCR to confirm diagnosis. Blood culture x2 drawn to rule out bacteremia, although source is unclear. Check urinalysis to r/o UTI. · COPD w/o exacerbation. Continue duoneb, home inhaler. · SAEID on CPAP. Continue nocturnal CPAP use. · Chronic hypoxic respiratory failure on home O2. Continue nocturnal O2. · DM on insulin. Hold oral diabetic pills for NPO, and give sliding scale insulin q6h. · Chronic diastolic CHF. BNP elevated than baseline, but no dyspnea or worsening edema per pt. Will hold torsemide and spironolactone in light of elevated Cr and NPO. · Mild POLLO. Avoid NSAIDs, hold diuretics. · H/o breast Ca on Aromasin. Continue Aromasin. · Hypothyroidism on synthroid. · Morbid obesity. Needs life style modifications as outpt. DVT prophylaxis - no AC for thrombocytopenia. SCDs. The above assessments and plans were explained to the patient and family in lay language, who indicated understanding.       PHYSICIAN CERTIFICATION  I certify that Marbella Mendez is expected to be hospitalized for greater than 2 midnights based on the above assessment and plan:      Tom Biggs MD  [facility]  Office Phone: [phone]

## 2020-03-01 NOTE — ED NOTES
Report received from [facility], care assumed at this time.      Vicki Soriano RN  02/29/20 [id number]

## 2020-03-01 NOTE — CONSULTS
Endocrinology   Consult Note  Dear Doctor Isabela Hidalgo    Thank You for the Consult     Pt. Was Admitted for : History of fall dizziness nausea vomiting broken ribs fall    Reason for Consult: Better control of blood glucose and thyroid issues      History Obtained From:  Patient/ EMR       HISTORY OF PRESENT ILLNESS:                The patient is a 64 y.o. female with significant past medical history of diabetes mellitus, breast cancer, primary liver cancer, COPD, morbid obesity, cirrhosis of the liver, hypertension, hyperlipidemia, thyroid cancer and had thyroidectomy, on home oxygen comes in complaining of nausea vomiting chest wall pain after she had a fall also feels dizzy. Pt had cardiac cath and was negative for CAD. ROS:   Pt's ROS done in detail. Abnormal ROS are noted in Medical and Surgical History Section below:      Other Medical History:        Diagnosis Date    Anxiety     Asthma     Cancer ([address]     breast(left) cancer- dx 10/2015- tx with radiation- following with Dr Aurelia Epperson of liver, [facility])     liver    CHF (congestive heart failure) ([address]     Chronic ulcer of right thigh with fat layer exposed ([address]     COPD (chronic obstructive pulmonary disease) ([address]     follow with Dr Antonia Alexis Diabetes mellitus ([address]     dx10+ yrs ago- follows with Dr Shazia Frye    Diabetes mellitus with skin ulcer ([address]     History of kidney stones     \"last one 3/2016- passed it- have had stones off and on for past 5 yrs follow with Dr Joya Overall Hypertension     Liver cirrhosis ([address]     for liver bx 7/10/2017    Morbid obesity ([address] 10/20/2015    PT TOO LARGE FOR MRI LEFT SHOULDER    On home oxygen therapy     oxygen at 4l/nc at hs    PONV (postoperative nausea and vomiting)     \"did get sick with the breast surgery\"    Sleep apnea     \"had sleep study several yrs ago- c-pap  does use it\"    Thyroid disease     WD-Local infection of skin and subcutaneous tissue 12/8/2017    WD-Ulcer of abdomen wall, limited to breakdown of skin [facility]     WD-Unspecified open wound of abdominal wall, right lower quadrant without penetration into peritoneal cavity, initial encounter      Surgical History:        Procedure Laterality Date    BREAST SURGERY Left 2015    EXCISION MASS\"lumpectomy- took out 3 lymph nodes all ok\"    CARDIAC CATHETERIZATION  01/21/2020    no intervention    CYSTOSCOPY Left 12/23/13    stent placement    LIVER BIOPSY  07/10/2017    LIVER SURGERY      TACE procedure in Aug 2019   Doctor [address] [address]    \"took most of the thyroid out\"    UPPER GASTROINTESTINAL ENDOSCOPY         Allergies:  Lasix [furosemide]; Sulfa antibiotics; Nystatin; and Tape [adhesive tape]    Family History:       Problem Relation Age of Onset   [employer] Cancer Mother         breast    Diabetes Mother     Heart Disease Father         MI    Diabetes Father     Other Sister         cirhosis    Heart Disease Sister     Other Brother         cirhosis    Kidney Disease Brother     Asthma Sister         COPD    Cancer Brother         liver cancer     REVIEW OF SYSTEMS:  Review of System Done as noted above     PHYSICAL EXAM:      Vitals:    BP (!) 117/59 Comment: notified LPN  Pulse 93   Temp 97.7 °F (36.5 °C) (Oral)   Resp 18   Ht 5' 3\" (1.6 m)   Wt (!) 310 lb (140.6 kg)   LMP 01/03/2012   SpO2 96%   BMI 54.91 kg/m²     CONSTITUTIONAL:  awake, alert, cooperative, appears stated age   EYES:  vision intact Fundoscopic Exam not performed   ENT:Normal  NECK:  Supple, No JVD. Thyroid Exam:Normal   LUNGS:  Has Vesicular Breath Sounds,   CARDIOVASCULAR:  Normal apical impulse, regular rate and rhythm, normal S1 and S2, no S3 or S4, and has no  murmur   ABDOMEN:  No scars, normal bowel sounds, soft, non-distended, non-tender, no masses palpated, no hepatolienomegaly  Musculoskeletal: Normal  Extremities: Normal, peripheral pulses normal, , has no edema   NEUROLOGIC:  Awake, alert, oriented to name, place and time. Cranial nerves II-XII are grossly intact. Motor is  intact. Sensory is intact. ,  and gait is normal.    DATA:    CBC:   Recent Labs     02/29/20  1709 03/01/20  0609   WBC 14.0* 13.6*   HGB 12.4* 11.4*   PLT 88* 94*    CMP:  Recent Labs     02/29/20  1740   *   K 5.4*   CL 93*   CO2 24   BUN 30*   CREATININE 1.2*   CALCIUM 8.5   PROT 7.0   LABALBU 2.5*   BILITOT 1.8*   ALKPHOS 168*   *   ALT 93*     Lipids:   Lab Results   Component Value Date    CHOL 123 10/27/2019    HDL 54 10/27/2019    TRIG 69 10/27/2019     Glucose:   Recent Labs     02/29/20  2339 03/01/20  0536   POCGLU 150* 141*     Hemoglobin A1C:   Lab Results   Component Value Date    LABA1C 8.3 10/27/2019     Free T4:   Lab Results   Component Value Date    T4FREE 1.29 09/15/2018     Free T3: No results found for: FT3  TSH High Sensitivity:   Lab Results   Component Value Date    TSHHS 2.150 09/15/2018       Xr Chest Standard (2 Vw)    Result Date: 2/29/2020  EXAMINATION: TWO XRAY VIEWS OF THE CHEST 2/29/2020 3:50 pm COMPARISON: 10/26/2019 HISTORY: ORDERING SYSTEM PROVIDED HISTORY: fall, right rib pain   . Stable cardiomegaly Right 7th rib fracture of uncertain age     Ct Head Wo Contrast    Result Date: 2/29/2020  EXAMINATION: CT OF THE HEAD WITHOUT CONTRAST;  T       CT head: No evidence for acute intracranial pathology. CT cervical spine: No acute abnormality of the cervical spine. Mild multilevel degenerative changes. Result Date: 2/29/2020  EXAMINATION: RIGHT UPPER QUADRANT ULTRASOUND 2/29/2020 7:07 pm       Limited exam for reasons described above. There may be some wall thickening of the gallbladder although this may just relate to suboptimal visualization of the gallbladder and/or under distention of the gallbladder. No other gallbladder pathology identified. No convincing sonographic findings for acute cholecystitis. If there is persistent clinical concern consider nuclear medicine study.  Mildly prominent common bile duct measuring 7 mm without obvious etiology or definite intraductal abnormality noted. Cirrhotic liver morphology without focal liver lesion identified. Scheduled Medicines   Medications:    budesonide-formoterol  2 puff Inhalation BID    cloNIDine  0.2 mg Oral TID    exemestane  25 mg Oral Daily    ondansetron  4 mg Intravenous Once    hydrOXYzine  100 mg Oral Nightly    ipratropium-albuterol  1 vial Inhalation Q6H    isosorbide mononitrate  30 mg Oral Nightly    levothyroxine  200 mcg Oral Daily    atorvastatin  20 mg Oral Daily    zolpidem  10 mg Oral Nightly    sodium chloride flush  10 mL Intravenous 2 times per day    insulin lispro  0-12 Units Subcutaneous Q6H      Infusions:    dextrose           IMPRESSION    Patient Active Problem List   Diagnosis    Calculus of ureter    Asthma    Obstructive sleep apnea    Breast mass, left    Rotator cuff arthropathy    Malignant neoplasm of left female breast (HCC)    History of breast lump/mass excision    S/P lymph node biopsy    Cirrhosis of liver without ascites (HCC)    Pain of left breast    S/P radiation therapy 4-12 wks ago    Diabetes mellitus with skin ulcer (HCC)    WD-Ulcer of abdomen wall with fat layer exposed [facility]    Mild persistent asthma without complication    WD-Unspecified open wound of abdominal wall, right lower quadrant without penetration into peritoneal cavity, initial encounter    Retroperitoneal lymphadenopathy    WD-Local infection of skin and subcutaneous tissue    Liver cirrhosis secondary to GALLEGOS (nonalcoholic steatohepatitis) (HCC)    CHF (congestive heart failure), NYHA class I, acute on chronic, combined (HCC)    Low hemoglobin    Right-sided chest wall pain    Shortness of breath    Abnormal transaminases    Hepatocellular carcinoma (HCC)         RECOMMENDATIONS:      1. Reviewed POC blood glucose . Labs and X ray results   2. Reviewed Home and Current Medicines   3.  Will Start On Correction bolus Humalog Insulin regime /    4. Monitor Blood glucose frequently   5. Modify  the dose of Insulin as needed        Will follow with you  Again thank you for sharing pt's care with me.      Truly yours,       Ceci Anaya MD

## 2020-03-01 NOTE — ED PROVIDER NOTES
Mario Hester was checked out to me by  [address] [place] [address]. Please see his/her initial documentation for details of the patient's ED presentation, physical exam and completed studies. In brief, Mario Hester is a 64 y.o. female that presents with several complaints including generalized fatigue, lightheadedness, nausea vomiting, coughing and lightheadedness. Patient reports he was recently diagnosed with influenza empirically and started on Tamiflu. Xr Chest Standard (2 Vw)    Result Date: 2/29/2020  EXAMINATION: TWO XRAY VIEWS OF THE CHEST 2/29/2020 3:50 pm COMPARISON: 10/26/2019 HISTORY: ORDERING SYSTEM PROVIDED HISTORY: fall, right rib pain TECHNOLOGIST PROVIDED HISTORY: Reason for exam:->fall, right rib pain Reason for Exam: sob Acuity: Acute Type of Exam: Initial Additional signs and symptoms: dizziness FINDINGS: The lungs are without acute focal process. There is no effusion or pneumothorax. The cardiomediastinal silhouette is stable. Fracture of the right 7th rib of uncertain age. Stable cardiomegaly Right 7th rib fracture of uncertain age     Ct Head Wo Contrast    Result Date: 2/29/2020  EXAMINATION: CT OF THE HEAD WITHOUT CONTRAST; CT OF THE CERVICAL SPINE WITHOUT CONTRAST 2/29/2020 3:36 pm TECHNIQUE: CT of the head was performed without the administration of intravenous contrast. Dose modulation, iterative reconstruction, and/or weight based adjustment of the mA/kV was utilized to reduce the radiation dose to as low as reasonably achievable.; CT of the cervical spine was performed without the administration of intravenous contrast. Multiplanar reformatted images are provided for review. Dose modulation, iterative reconstruction, and/or weight based adjustment of the mA/kV was utilized to reduce the radiation dose to as low as reasonably achievable. COMPARISON: None.  HISTORY: ORDERING SYSTEM PROVIDED HISTORY: head pain TECHNOLOGIST PROVIDED HISTORY: Has a \"code stroke\" or \"stroke alert\" been called? ->No Reason for exam:->head pain Reason for Exam: fall out of bed, upper neck pain Acuity: Acute; ORDERING SYSTEM PROVIDED HISTORY: fall trauma TECHNOLOGIST PROVIDED HISTORY: Reason for exam:->fall trauma Reason for Exam: fall out of bed, posterior head pain Acuity: Acute FINDINGS: CT head: BRAIN/VENTRICLES: There is no acute intracranial hemorrhage, mass effect or midline shift. No abnormal extra-axial fluid collection. The gray-white differentiation is maintained without evidence of an acute infarct. There is no evidence of hydrocephalus. ORBITS: The visualized portion of the orbits demonstrate no acute abnormality. SINUSES: The visualized paranasal sinuses and mastoid air cells demonstrate no acute abnormality. SOFT TISSUES/SKULL: No acute abnormality of the visualized skull or soft tissues. CT cervical spine: BONES/ALIGNMENT: There is no acute fracture or traumatic malalignment. DEGENERATIVE CHANGES: Mild multilevel degenerative disc disease and facet osteoarthritis. SOFT TISSUES: There is no prevertebral soft tissue swelling. CT head: No evidence for acute intracranial pathology. CT cervical spine: No acute abnormality of the cervical spine. Mild multilevel degenerative changes. Ct Cervical Spine Wo Contrast    Result Date: 2/29/2020  EXAMINATION: CT OF THE HEAD WITHOUT CONTRAST; CT OF THE CERVICAL SPINE WITHOUT CONTRAST 2/29/2020 3:36 pm TECHNIQUE: CT of the head was performed without the administration of intravenous contrast. Dose modulation, iterative reconstruction, and/or weight based adjustment of the mA/kV was utilized to reduce the radiation dose to as low as reasonably achievable.; CT of the cervical spine was performed without the administration of intravenous contrast. Multiplanar reformatted images are provided for review.  Dose modulation, iterative reconstruction, and/or weight based adjustment of the mA/kV was utilized to reduce the radiation dose to as low as reasonably achievable. COMPARISON: None. HISTORY: ORDERING SYSTEM PROVIDED HISTORY: head pain TECHNOLOGIST PROVIDED HISTORY: Has a \"code stroke\" or \"stroke alert\" been called? ->No Reason for exam:->head pain Reason for Exam: fall out of bed, upper neck pain Acuity: Acute; ORDERING SYSTEM PROVIDED HISTORY: fall trauma TECHNOLOGIST PROVIDED HISTORY: Reason for exam:->fall trauma Reason for Exam: fall out of bed, posterior head pain Acuity: Acute FINDINGS: CT head: BRAIN/VENTRICLES: There is no acute intracranial hemorrhage, mass effect or midline shift. No abnormal extra-axial fluid collection. The gray-white differentiation is maintained without evidence of an acute infarct. There is no evidence of hydrocephalus. ORBITS: The visualized portion of the orbits demonstrate no acute abnormality. SINUSES: The visualized paranasal sinuses and mastoid air cells demonstrate no acute abnormality. SOFT TISSUES/SKULL: No acute abnormality of the visualized skull or soft tissues. CT cervical spine: BONES/ALIGNMENT: There is no acute fracture or traumatic malalignment. DEGENERATIVE CHANGES: Mild multilevel degenerative disc disease and facet osteoarthritis. SOFT TISSUES: There is no prevertebral soft tissue swelling. CT head: No evidence for acute intracranial pathology. CT cervical spine: No acute abnormality of the cervical spine. Mild multilevel degenerative changes. Us Abdomen Limited    Result Date: 2/29/2020  EXAMINATION: RIGHT UPPER QUADRANT ULTRASOUND 2/29/2020 7:07 pm COMPARISON: CT abdomen and pelvis 06/12/2018. HISTORY: ORDERING SYSTEM PROVIDED HISTORY: abdominal pain TECHNOLOGIST PROVIDED HISTORY: Reason for exam:->abdominal pain Reason for Exam: abdominal pain Acuity: Acute Type of Exam: Initial FINDINGS: Somewhat limited exam secondary to patient body habitus. Patient body habitus also prevented decubitus imaging as the patient was unable to roll. Within these limitations: LIVER: Cirrhotic liver morphology.   No focal liver lesions are seen. No intrahepatic biliary dilatation identified. BILIARY SYSTEM:  Suboptimal visualization of the gallbladder. Gallbladder appears under distended. There appears to be some wall thickening with gallbladder wall measuring up to approximately 6 mm, although this may relate to the limitations of the exam, the suboptimal visualization of the gallbladder and the under distended configuration of the gallbladder. No stones are identified. Negative sonographic Olsen's sign. Common bile duct is mildly distended measuring 7 mm. No definite intraductal abnormality identified. RIGHT KIDNEY: The right kidney is grossly unremarkable without evidence of hydronephrosis. PANCREAS:  Visualized portions of the pancreas are unremarkable. OTHER: No evidence of right upper quadrant ascites. Limited exam for reasons described above. There may be some wall thickening of the gallbladder although this may just relate to suboptimal visualization of the gallbladder and/or under distention of the gallbladder. No other gallbladder pathology identified. No convincing sonographic findings for acute cholecystitis. If there is persistent clinical concern consider nuclear medicine study. Mildly prominent common bile duct measuring 7 mm without obvious etiology or definite intraductal abnormality noted. Cirrhotic liver morphology without focal liver lesion identified.        Labs  Results for orders placed or performed during the hospital encounter of 02/29/20   Rapid influenza A/B antigens   Result Value Ref Range    Rapid Influenza A Ag NEGATIVE NEGATIVE    Rapid Influenza B Ag NEGATIVE NEGATIVE   CBC auto diff   Result Value Ref Range    WBC 14.0 (H) 4.0 - 10.5 K/CU MM    RBC 4.54 4.2 - 5.4 M/CU MM    Hemoglobin 12.4 (L) 12.5 - 16.0 GM/DL    Hematocrit 39.2 37 - 47 %    MCV 86.3 78 - 100 FL    MCH 27.3 27 - 31 PG    MCHC 31.6 (L) 32.0 - 36.0 %    RDW 15.7 (H) 11.7 - 14.9 %    Platelets 88 (L) 373 - 440 K/CU MM Diagnosis       Sinus tachycardia with premature supraventricular complexes  Possible Anterior infarct , age undetermined  Abnormal ECG  When compared with ECG of 26-OCT-2019 15:55,  premature supraventricular complexes are now present         MDM:  Pt presents as above. Emergent conditions considered. Presentation prompted initial new transaminitis and hyperbilirubinemia as well as an elevated BNP. Patient receiving IV fluids for hyperkalemia and some degree of dehydration. Right upper quadrant ultrasound was ordered and pending on my inheritance of the patient. Right upper quadrant ultrasound does demonstrate some nonspecific gallbladder wall thickening but otherwise no evidence of cholecystitis. Bile duct is mildly prominent as above. I did speak with patient's gastroenterologist here, Dr. Rupa Tai, who agrees with admission will see the patient further testing/evaluation. Patient is amendable to staying here for this assessment. I do not believe patient's current issue is secondary to her hepatocellular carcinoma given her recent negative evaluation by her oncology team.  Patient remains hemodynamically stable with benign exam on my reassessment. Patient is admitted to medicine after discussion with hospitalist team.    Questions sought and answered with the patient. They voice understanding and agree with plan. Instructed to return for any worsening or worrisome concerns. Final Impression:  1. Fatigue, unspecified type    2. Dizziness    3. Elevated liver enzymes    4. Elevated brain natriuretic peptide (BNP) level    5. Hyperkalemia          Please note that portions of this note may have been complete with a voice recognition program.  Efforts were made to edit the dictations, but occasional words are mis-transcribed.          Clare Maria MD  02/29/20 6010

## 2020-03-01 NOTE — PROGRESS NOTES
This nurse and tj Younger completed skin assessment. Tear/abrasion to right abd fold, pt stated it was due to heart cath last Sunday.

## 2020-03-02 NOTE — PLAN OF CARE
Problem: Falls - Risk of:  Goal: Will remain free from falls  Description  Will remain free from falls  3/2/2020 1303 by Lizeth Owen RN  Outcome: Ongoing  3/2/2020 0657 by Maite Redding RN  Outcome: Met This Shift  Goal: Absence of physical injury  Description  Absence of physical injury  3/2/2020 1303 by Lizeth Owen RN  Outcome: Ongoing  3/2/2020 0657 by Maite Redding RN  Outcome: Met This Shift

## 2020-03-02 NOTE — PROGRESS NOTES
Hospitalist Progress Note      Name:  David Luis /Age/Sex: 1960  (64 y.o. female)   MRN & CSN:  [id number] & [id number] Admission Date/Time: 2020  3:52 PM   Location:  [address] [place] [address] PCP: Samina Chaney MD         Hospital Day: 3    Assessment and Plan:   David Luis is a 64 y.o.  female  who presents with Abnormal transaminases    1. Chest wall pain, right: Due to fall. Has right rib fracture of uncertain age. Pain control. 2. Transaminasemia and hyperbilirubinemia: Resolving. · Could be related to recent viral infection. · ltrasound did not show biliary obstruction or dilated gallbladder. · For HIDA scan today. · Will consult Surgery if with acute cholecystitis. · Gi following  3. Recent influenza: Completed 5 days of Tamiflu. Still with cough and myalgia. Respiratory PCR -ve  4. COPD not in exacerbation: Continue breathing treatment  5. Obstructive sleep apnea on CPAP  6. Chronic respiratory failure:   7. Type 2 diabetes: Sliding scale. Hypoglycemia protocol. Hold oral hypoglycemic agents. 8. Congestive heart failure, diastolic, chronic: Diuretics were held because of acute kidney injury and hyperkalemia. 9. Acute kidney injury: Creatinine 1.2 on admission. Still 1.2.  10. Hyperkalemia: Resolved  11. Hyponatremia: Sodium 129. From Liver cirrhosis, CHF  12. History of breast cancer: Continue Aromasin  13. Hypothyroidism: Continue Synthroid   14. Morbid obesity:  15. Liver cirrhosis due to nonalcoholic fatty liver disease  16. Hepatocellular carcinoma status post TACE    For HIDA scan     Diet DIET CARB CONTROL;   DVT Prophylaxis [] Lovenox, []  Heparin, [x] SCDs, [] Warfarin  [] NOAC     GI Prophylaxis [] PPI,  [] H2 Blocker,  [] Carafate,  [x] Diet/Tube Feeds   Code Status Full Code   MDM [] Low, [x] Moderate,[]  High     History of Present Illness:     Chief Complaint: Abnormal transaminases    Seen and examined today. Abdominal pain is well controlled.  No nausea or vomiting. No fever or chills. Ten point ROS reviewed negative, unless as noted above    Objective: Intake/Output Summary (Last 24 hours) at 3/2/2020 0987  Last data filed at 3/1/2020 1822  Gross per 24 hour   Intake 120 ml   Output --   Net 120 ml      Vitals:   Vitals:    03/02/20 0411   BP: (!) 143/63   Pulse: 83   Resp: 24   Temp: 97.8 °F (36.6 °C)   SpO2: 94%     Physical Exam:   GEN Awake female, sitting upright in bed in no apparent distress. Appears given age. Obese, on oxygen via nasal cannula. EYES Pupils are equally round. No scleral erythema, discharge, or conjunctivitis. HENT Mucous membranes are moist. Oral pharynx without exudates, no evidence of thrush. NECK Supple, no apparent thyromegaly or masses. RESP decreased breath sounds. CARDIO/VASC S1/S2 auscultated. Regular rate without appreciable murmurs, rubs, or gallops. No JVD or carotid bruits. Peripheral pulses equal bilaterally and palpable. No peripheral edema. GI Abdomen with tenderness RUQ, + Olsen's ? MSK No gross joint deformities. SKIN Normal coloration, warm, dry. NEURO Cranial nerves appear grossly intact, normal speech, no lateralizing weakness. PSYCH Awake, alert, oriented x 4. Affect appropriate.     Medications:   Medications:    glimepiride  4 mg Oral Daily with breakfast    insulin lispro  0-12 Units Subcutaneous TID WC    insulin lispro  0-6 Units Subcutaneous 2 times per day    budesonide-formoterol  2 puff Inhalation BID    cloNIDine  0.2 mg Oral TID    exemestane  25 mg Oral Daily    ondansetron  4 mg Intravenous Once    hydrOXYzine  100 mg Oral Nightly    ipratropium-albuterol  1 vial Inhalation Q6H    isosorbide mononitrate  30 mg Oral Nightly    levothyroxine  200 mcg Oral Daily    atorvastatin  20 mg Oral Daily    zolpidem  10 mg Oral Nightly    sodium chloride flush  10 mL Intravenous 2 times per day      Infusions:    dextrose       PRN Meds: gabapentin, 300 mg, TID PRN  sodium chloride flush, 10 mL, PRN  acetaminophen, 650 mg, Q6H PRN  polyethylene glycol, 17 g, Daily PRN  promethazine, 12.5 mg, Q6H PRN    Or  ondansetron, 4 mg, Q6H PRN  oxyCODONE, 5 mg, Q4H PRN  HYDROmorphone, 0.25 mg, Q4H PRN  glucose, 15 g, PRN  dextrose, 12.5 g, PRN  glucagon (rDNA), 1 mg, PRN  dextrose, 100 mL/hr, PRN        Recent Labs     02/29/20  1709 03/01/20  0609 03/02/20  0547   WBC 14.0* 13.6* 15.6*   HGB 12.4* 11.4* 10.9*   HCT 39.2 36.1* 35.2*   PLT 88* 94* 116*      Recent Labs     02/29/20  1740 03/01/20  0609 03/02/20  0547   * 129* 129*   K 5.4* 4.5 5.0   CL 93* 97* 95*   CO2 24 21 24   BUN 30* 36* 47*   CREATININE 1.2* 1.1 1.2*     Recent Labs     02/29/20  1740 03/01/20  0609 03/02/20  0547   * 107* 95*   ALT 93* 77* 75*   BILITOT 1.8* 1.6* 1.6*   ALKPHOS 168* 143* 141*     Recent Labs     03/02/20  0547   INR 1.64     Recent Labs     02/29/20  1740   TROPONINT <0.010      Imaging reviewed    Electronically signed by Jaylon Juarez MD on 3/2/2020 at 9:25 AM

## 2020-03-02 NOTE — PROGRESS NOTES
DOING FAIR STILL WITH RUQ ABD PAIN WITH NAUSEA  VITALS STABLE   LABS NOTED LFTS IMPROVING  WILL CPM HIDA SCAN TODAY AND IF NONVISUALISATION OF GB -AC--WILL NEED SURGICAL CONSULT

## 2020-03-02 NOTE — CARE COORDINATION
Met c pt's spouse at bedside to initiate discharge planning. Pt lives at home with spouse, remains fully ind with ADLs including working/driving. Pt has pcp/ active insurance and can afford meds. Pt denied needs, advised CM is available if needs arise.

## 2020-03-02 NOTE — PROGRESS NOTES
Progress Note( Dr. Shraddha Hughes)  3/2/2020  Subjective:   Admit Date: 2/29/2020  PCP: Madina Landaverde MD    Admitted For : Patient admitted with history of fall and fracture of ribs. Subsequent as scheduled patient may have gallbladder disease with acute cholecystitis    Consulted For: Better control of blood glucose and thyroid issues    Interval History: Patient is being investigated for gallbladder disease to have ultrasound and possibility nuclear scan done later    Denies any chest pains,   Denies SOB . Denies nausea or vomiting. No new bowel or bladder symptoms.        Intake/Output Summary (Last 24 hours) at 3/2/2020 0747  Last data filed at 3/1/2020 1822  Gross per 24 hour   Intake 180 ml   Output 250 ml   Net -70 ml       DATA    CBC:   Recent Labs     02/29/20  1709 03/01/20  0609 03/02/20  0547   WBC 14.0* 13.6* 15.6*   HGB 12.4* 11.4* 10.9*   PLT 88* 94* 116*    CMP:  Recent Labs     02/29/20  1740 03/01/20  0609 03/02/20  0547   * 129* 129*   K 5.4* 4.5 5.0   CL 93* 97* 95*   CO2 24 21 24   BUN 30* 36* 47*   CREATININE 1.2* 1.1 1.2*   CALCIUM 8.5 7.9* 8.2*   PROT 7.0 5.7* 6.0*   LABALBU 2.5* 2.4* 2.5*   BILITOT 1.8* 1.6* 1.6*   ALKPHOS 168* 143* 141*   * 107* 95*   ALT 93* 77* 75*     Lipids:   Lab Results   Component Value Date    CHOL 123 10/27/2019    HDL 54 10/27/2019    TRIG 69 10/27/2019     Glucose:  Recent Labs     03/02/20  0004 03/02/20  0027 03/02/20  0602   POCGLU 165* 169* 133*     [id number]:  Lab Results   Component Value Date    LABA1C 8.3 10/27/2019     High Sensitivity TSH:   Lab Results   Component Value Date    TSHHS 2.150 09/15/2018     Free T3: No results found for: FT3  Free T4:  Lab Results   Component Value Date    T4FREE 1.29 09/15/2018             Scheduled Medicines   Medications:    glimepiride  4 mg Oral Daily with breakfast    insulin lispro  0-12 Units Subcutaneous TID WC    insulin lispro  0-6 Units Subcutaneous 2 times per day    budesonide-formoterol  2 puff Inhalation BID    cloNIDine  0.2 mg Oral TID    exemestane  25 mg Oral Daily    ondansetron  4 mg Intravenous Once    hydrOXYzine  100 mg Oral Nightly    ipratropium-albuterol  1 vial Inhalation Q6H    isosorbide mononitrate  30 mg Oral Nightly    levothyroxine  200 mcg Oral Daily    atorvastatin  20 mg Oral Daily    zolpidem  10 mg Oral Nightly    sodium chloride flush  10 mL Intravenous 2 times per day    insulin lispro  0-12 Units Subcutaneous Q6H      Infusions:    dextrose           Objective:   Vitals: BP (!) 143/63 Comment: notified RN Cristina Santoyo  Pulse 83   Temp 97.8 °F (36.6 °C) (Oral)   Resp 24   Ht 5' 3\" (1.6 m)   Wt (!) 303 lb (137.4 kg)   LMP 01/03/2012   SpO2 94%   BMI 53.67 kg/m²   General appearance: alert and cooperative with exam  Neck: no JVD or bruit  Thyroid : Normal lobes   Lungs: Has Vesicular Breath sounds   Heart:  regular rate and rhythm  Abdomen: soft, non-tender; bowel sounds normal; no masses,  no organomegaly  Musculoskeletal: Normal  Extremities: extremities normal, , no edema  Neurologic:  Awake, alert, oriented to name, place and time. Cranial nerves II-XII are grossly intact. Motor is  intact. Sensory is intact. ,  and gait is normal.    Assessment:     Patient Active Problem List:     Calculus of ureter     Asthma     Obstructive sleep apnea     Breast mass, left     Rotator cuff arthropathy     Malignant neoplasm of left female breast (HCC)     History of breast lump/mass excision     S/P lymph node biopsy     Cirrhosis of liver without ascites (HCC)     Pain of left breast     S/P radiation therapy 4-12 wks ago     Diabetes mellitus with skin ulcer (HCC)     WD-Ulcer of abdomen wall with fat layer exposed ([address]     Mild persistent asthma without complication     WD-Unspecified open wound of abdominal wall, right lower quadrant without penetration into peritoneal cavity, initial encounter     Retroperitoneal lymphadenopathy WD-Local infection of skin and subcutaneous tissue     Liver cirrhosis secondary to GALLEGOS (nonalcoholic steatohepatitis) (HCC)     CHF (congestive heart failure), NYHA class I, acute on chronic, combined (HCC)     Low hemoglobin     Right-sided chest wall pain     Shortness of breath     Abnormal transaminases     Hepatocellular carcinoma ([facility]      Plan:     1. Reviewed POC blood glucose . Labs and X ray results   2. Reviewed Current Medicines   3. On Correction bolus Humalog/ Basal Lantus Insulin regime / and Oral Hypoglycemic drugs   4. Monitor Blood glucose frequently   5. Modified  the dose of Insulin/ other medicines as needed   6. Plan for ultrasound of the abdomen and also if needed nuclear scan of gallbladder  7. Will follow     .      Satish Valentin MD

## 2020-03-02 NOTE — CONSULTS
SURGICAL CONSULTATION    Date of Admission:  2/29/2020  Date of Consultation:  3/2/2020    PCP:  Khalif Barry MD  Thank you Dr. Glory Pagan MD very much for your consultation, it's always appreciated. I had the privilege today to see your patient Bola Feldman. Chief Complaint / History of Present Illness:  Bola Feldman is a very pleasant 64 y.o. female who presents with pain in the Right Upper Quadrant and is acute, on top of 1 year chronic, worsening, dull and throbbing compared to patient's normal condition. It is severe in intensity for a duration of 1 day and is continuous with modifying factors increased by or worse with eating. [place] HIDA NO biliary opacification in the gall bladder AT ALL, consistent with ACUTE calculous cholecystitis, but also elevated LFTs, needs lap lianne with IOC, possible CBDE.        PMH:   has a past medical history of Anxiety, Asthma, Cancer ([address] Cancer of liver, primary ([address] CHF (congestive heart failure) ([address] Chronic ulcer of right thigh with fat layer exposed ([address] COPD (chronic obstructive pulmonary disease) ([address] Diabetes mellitus ([address] Diabetes mellitus with skin ulcer ([address] History of kidney stones, Hypertension, Liver cirrhosis ([address] Morbid obesity ([address] (10/20/2015), On home oxygen therapy, PONV (postoperative nausea and vomiting), Sleep apnea, Thyroid disease, WD-Local infection of skin and subcutaneous tissue (12/8/2017), WD-Ulcer of abdomen wall, limited to breakdown of skin ([address] and WD-Unspecified open wound of abdominal wall, right lower quadrant without penetration into peritoneal cavity, initial encounter. PSH:   has a past surgical history that includes Thyroidectomy (1990); Cystoscopy (Left, 12/23/13); Breast surgery (Left, 2015); Upper gastrointestinal endoscopy; liver biopsy (07/10/2017); Liver surgery; and Cardiac catheterization (01/21/2020). Allergies:     Allergies   Allergen Reactions    Lasix [Furosemide] Hives  Sulfa Antibiotics Rash    Nystatin Other (See Comments)     Pt reports \"burning\" sensation to skin    Tape Fly Fried Tape]         Home Meds:    Prior to Admission medications    Medication Sig Start Date End Date Taking? Authorizing Provider   ferrous sulfate (SUKHI-PENNY) 325 (65 Fe) MG tablet Take 1 tablet by mouth every other day 10/28/19  Yes Sandra Mathew MD   insulin glargine (LANTUS) 100 UNIT/ML injection vial Inject 50 Units into the skin nightly -stop when you are off the prednisone 10/28/19  Yes Sandra Mathew MD   insulin lispro (HUMALOG) 100 UNIT/ML injection vial Inject 0-30 Units into the skin 3 times daily (with meals) Stop when you are off the prednisone 10/29/19  Yes Sandra Mathew MD   insulin lispro (HUMALOG) 100 UNIT/ML injection vial Inject 25 Units into the skin 3 times daily (with meals) Stop when you are off the prednisone 10/29/19  Yes Sandra Mathew MD   metFORMIN (GLUCOPHAGE) 500 MG tablet  9/24/19  Yes Historical Provider, MD   gabapentin (NEURONTIN) 300 MG capsule Take 300 mg by mouth 3 times daily as needed for Pain. 9/24/19  Yes Historical Provider, MD   torsemide (DEMADEX) 20 MG tablet Take 1 tablet by mouth daily 9/9/18  Yes Phi Garcia MD   spironolactone (ALDACTONE) 50 MG tablet Take 100 mg by mouth 2 times daily    Yes Historical Provider, MD   ipratropium-albuterol (DUONEB) 0.5-2.5 (3) MG/3ML SOLN nebulizer solution Inhale 3 mLs into the lungs every 6 hours 6/19/17  Yes Gala Gray MD   budesonide-formoterol [facility]) 160-4.5 MCG/ACT AERO Inhale 2 puffs into the lungs 2 times daily 2/20/17  Yes Gala Gray MD   exemestane (AROMASIN) 25 MG chemo tablet Take 25 mg by mouth daily   Yes Historical Provider, MD   hydrOXYzine (ATARAX) 50 MG tablet Take 100 mg by mouth nightly  10/17/15  Yes Historical Provider, MD   zolpidem (AMBIEN) 10 MG tablet Take by mouth nightly.  Rachel Birmingham Historical Provider, MD   CPAP Machine MISC 10 cm by Does not apply route nightly. Yes Historical Provider, MD   OXYGEN Inhale 2 L into the lungs nightly. Yes Historical Provider, MD   simvastatin (ZOCOR) 20 MG tablet Take 20 mg by mouth nightly. Yes Historical Provider, MD   levothyroxine (SYNTHROID) 200 MCG tablet Take 200 mcg by mouth Daily. Yes Historical Provider, MD   glimepiride (AMARYL) 4 MG tablet Take 4 mg by mouth 2 times daily. Yes Historical Provider, MD   isosorbide mononitrate (IMDUR) 30 MG CR tablet Take 30 mg by mouth nightly    Yes Historical Provider, MD   cloNIDine (CATAPRES) 0.2 MG tablet Take 0.2 mg by mouth 3 times daily.    Yes Historical Provider, MD        Hospital Meds:    Current Facility-Administered Medications   Medication Dose Route Frequency Provider Last Rate Last Dose    glimepiride (AMARYL) tablet 4 mg  4 mg Oral Daily with breakfast Reza Freire MD        insulin lispro (HUMALOG) injection vial 0-12 Units  0-12 Units Subcutaneous TID WC Reza Freire MD        insulin lispro (HUMALOG) injection vial 0-6 Units  0-6 Units Subcutaneous 2 times per day ODIN Guerrero MD        piperacillin-tazobactam (ZOSYN) 3.375 g in dextrose 5 % 50 mL IVPB extended infusion (mini-bag)  3.375 g Intravenous Q8H Eulalia Choe MD        budesonide-formoterol (SYMBICORT) 160-4.5 MCG/ACT inhaler 2 puff  2 puff Inhalation BID Brendanrijsoe elias Broussard, MD        cloNIDine (CATAPRES) tablet 0.2 mg  0.2 mg Oral TID Nikia Broussard MD   0.2 mg at 03/01/20 2224    exemestane (AROMASIN) tablet 25 mg  25 mg Oral Daily Brendanrijose elias Overall, MD        gabapentin (NEURONTIN) capsule 300 mg  300 mg Oral TID PRN Brendanrijose elias Broussard, MD        ondansetron (ZOFRAN) injection 4 mg  4 mg Intravenous Once Garrie Overall, MD        hydrOXYzine (ATARAX) tablet 100 mg  100 mg Oral Nightly Berndanrijose elias Overall, MD   100 mg at 03/01/20 2224    ipratropium-albuterol (DUONEB) nebulizer solution 3 mL  1 vial Inhalation Q6H Nikia Broussard, MD   3 mL at 03/01/20 1124    isosorbide mononitrate (IMDUR) extended release tablet 30 mg  30 mg Oral Nightly Kenneth Glover MD   30 mg at 03/01/20 2224    levothyroxine (SYNTHROID) tablet 200 mcg  200 mcg Oral Daily Kenneth Glover MD   200 mcg at 03/01/20 0656    atorvastatin (LIPITOR) tablet 20 mg  20 mg Oral Daily Kenneth Glover MD   20 mg at 03/01/20 0368    zolpidem (AMBIEN) tablet 10 mg  10 mg Oral Nightly Kenneth Glover MD   10 mg at 03/01/20 2224    sodium chloride flush 0.9 % injection 10 mL  10 mL Intravenous 2 times per day Kenneth Glover MD   10 mL at 03/01/20 2225    sodium chloride flush 0.9 % injection 10 mL  10 mL Intravenous PRN Kenneth Glover MD        acetaminophen (TYLENOL) tablet 650 mg  650 mg Oral Q6H PRN Kenneth Glover MD        polyethylene glycol (GLYCOLAX) packet 17 g  17 g Oral Daily PRN Kenneth Glover MD        promethazine (PHENERGAN) tablet 12.5 mg  12.5 mg Oral Q6H PRN Kenneth Glover MD        Or    ondansetron (ZOFRAN) injection 4 mg  4 mg Intravenous Q6H PRN Kenneth Glover MD   4 mg at 02/29/20 2342    oxyCODONE (ROXICODONE) immediate release tablet 5 mg  5 mg Oral Q4H PRN Kenneth Glover MD   5 mg at 03/01/20 1423    HYDROmorphone (DILAUDID) injection 0.25 mg  0.25 mg Intravenous Q4H PRN Kenneth Glover MD   0.25 mg at 03/01/20 1029    glucose (GLUTOSE) 40 % oral gel 15 g  15 g Oral PRN Kenneth Glover MD        dextrose 50 % IV solution  12.5 g Intravenous PRN Kenneth Glover MD        glucagon (rDNA) injection 1 mg  1 mg Intramuscular PRN Kenneth Glover MD        dextrose 5 % solution  100 mL/hr Intravenous PRN Kenneth Glover MD          dextrose         Social History / Family History:     Social History     Socioeconomic History    Marital status:      Spouse name: None    Number of children: None    Years of education: None    Highest education level: None   Occupational History    None   Social Needs    Financial resource strain: None    Food insecurity:     Worry: None     Inability: None    Transportation needs:     Medical: None     Non-medical: None   Tobacco Use    Smoking status: Former Smoker     Packs/day: 1.50     Years: 20.00     Pack years: 30.00     Last attempt to quit: 3/23/2005     Years since quittin.9    Smokeless tobacco: Never Used   Substance and Sexual Activity    Alcohol use: No     Alcohol/week: 0.0 standard drinks    Drug use: No    Sexual activity: Yes     Partners: Male   Lifestyle    Physical activity:     Days per week: None     Minutes per session: None    Stress: None   Relationships    Social connections:     Talks on phone: None     Gets together: None     Attends [employer] service: None     Active member of club or organization: None     Attends meetings of clubs or organizations: None     Relationship status: None    Intimate partner violence:     Fear of current or ex partner: None     Emotionally abused: None     Physically abused: None     Forced sexual activity: None   Other Topics Concern    None   Social History Narrative    None      Family History   Problem Relation Age of Onset    Cancer Mother         breast    Diabetes Mother     Heart Disease Father         MI    Diabetes Father     Other Sister         cirhosis    Heart Disease Sister     Other Brother         cirhosis    Kidney Disease Brother     Asthma Sister         COPD    Cancer Brother         liver cancer    otherwise irrelevant to this surgical issue. Review of Systems:  Constitutional: Negative for fever, chills, diaphoresis, appetite change and fatigue. HENT: Negative for sore throat, trouble swallowing and voice change. Respiratory: Negative for cough, positive for shortness of breath no wheezing. Cardiovascular: Negative for chest pain positive for SOB with one flight of stairs exertion, no pitting LE edema.    Gastrointestinal: Positive for RUQ pain Negative for nausea, vomiting, abdominal distention, constipation, no diarrhea, blood in stool, anal bleeding or rectal pain. Musculoskeletal: Negative for joint swelling and arthralgias. Skin: Warm and dry, well perfused. Neurological: Negative for seizures, syncope, speech difficulty and weakness. Hematological/Lymphatic: Negative for adenopathy. No history of DVT/PE. Does not bruise/bleed easily. Psychiatric/Behavioral: Negative for agitation. All others reviewed and negative. Physical Exam:  Vital Signs:   Vitals:    03/02/20 1115   BP: 135/66   Pulse: 98   Resp: 16   Temp: 97.6 °F (36.4 °C)   SpO2: 93%     Body mass index is 53.67 kg/m². General appearance: Pt Alert and oriented, in no apparent acute distress. HEENT:  MAIKEL, Conjunctiva clear. EOMI, No JVDs, Bruits, Megalies: neither thyroid nor lymph nodes. Lungs: Clear to auscultation bilaterally. Heart: Regular rate and rhythm, S1, S2 normal, no murmur, rub or gallop. Abdomen: Extremely tender RUQ and positive Olsen sign. Non distended. Positive bowel sounds. No hernias noted, no masses palpable. Extremities: Warm, well perfused, no cyanosis or edema. Skin: Skin color, texture, turgor normal.  Neurologic: Grossly normal, Cranial nerves from II to XII intact, Deep tendon reflexes normal.  Lymph nodes: No palpable LNs, Cervical, groins, abdominal.  Musculoskeletal: Bilateral Upper and lower extremities ROM within normal limits. Radiologic / Imaging / TESTING  HIDA scan:     Impression   1.  Gallbladder is not visualized by 4 hours suggesting acute cholecystitis.       2.  Delayed clearance of radiotracer from the blood pool suggesting   underlying hepatic dysfunction. US:     Impression   Limited exam for reasons described above.       There may be some wall thickening of the gallbladder although this may just   relate to suboptimal visualization of the gallbladder and/or under distention   of the gallbladder.  No other gallbladder pathology identified.  No   convincing sonographic findings for acute cholecystitis.  If there is   persistent clinical concern consider nuclear medicine study.       Mildly prominent common bile duct measuring 7 mm without obvious etiology or   definite intraductal abnormality noted.       Cirrhotic liver morphology without focal liver lesion identified.           Labs:    Recent Results (from the past 24 hour(s))   POCT Glucose    Collection Time: 03/01/20  4:12 PM   Result Value Ref Range    POC Glucose 172 (H) 70 - 99 MG/DL   POCT Glucose    Collection Time: 03/02/20 12:04 AM   Result Value Ref Range    POC Glucose 165 (H) 70 - 99 MG/DL   POCT Glucose    Collection Time: 03/02/20 12:27 AM   Result Value Ref Range    POC Glucose 169 (H) 70 - 99 MG/DL   Amylase    Collection Time: 03/02/20  5:47 AM   Result Value Ref Range    Amylase 48 25 - 115 U/L   CBC Auto Differential    Collection Time: 03/02/20  5:47 AM   Result Value Ref Range    WBC 15.6 (H) 4.0 - 10.5 K/CU MM    RBC 4.05 (L) 4.2 - 5.4 M/CU MM    Hemoglobin 10.9 (L) 12.5 - 16.0 GM/DL    Hematocrit 35.2 (L) 37 - 47 %    MCV 86.9 78 - 100 FL    MCH 26.9 (L) 27 - 31 PG    MCHC 31.0 (L) 32.0 - 36.0 %    RDW 15.8 (H) 11.7 - 14.9 %    Platelets 620 (L) 200 - 440 K/CU MM    MPV 11.4 (H) 7.5 - 11.1 FL    Myelocyte Percent 1 (H) 0.0 %    Metamyelocytes Relative 1 (H) 0.0 %    Bands Relative 14 (H) 5 - 11 %    Segs Relative 63.0 36 - 66 %    Eosinophils % 1.0 0 - 3 %    Lymphocytes % 7.0 (L) 24 - 44 %    Monocytes % 13.0 (H) 0 - 4 %    nRBC 1     Myelocytes Absolute 0.16 K/CU MM    Metamyelocytes Absolute 0.16 K/CU MM    Bands Absolute 2.18 K/CU MM    Segs Absolute 9.8 K/CU MM    Eosinophils Absolute 0.2 K/CU MM    Lymphocytes Absolute 1.1 K/CU MM    Monocytes Absolute 2.0 K/CU MM    Differential Type MANUAL DIFFERENTIAL     Polychromasia 1+     Toxic Granulation PRESENT     PLT Morphology SEVERAL LARGE PLATELETS    Comprehensive Metabolic Panel    Collection Time: 03/02/20  5:47 AM Result Value Ref Range    Sodium 129 (L) 135 - 145 MMOL/L    Potassium 5.0 3.5 - 5.1 MMOL/L    Chloride 95 (L) 99 - 110 mMol/L    CO2 24 21 - 32 MMOL/L    BUN 47 (H) 6 - 23 MG/DL    CREATININE 1.2 (H) 0.6 - 1.1 MG/DL    Glucose 143 (H) 70 - 99 MG/DL    Calcium 8.2 (L) 8.3 - 10.6 MG/DL    Alb 2.5 (L) 3.4 - 5.0 GM/DL    Total Protein 6.0 (L) 6.4 - 8.2 GM/DL    Total Bilirubin 1.6 (H) 0.0 - 1.0 MG/DL    ALT 75 (H) 10 - 40 U/L    AST 95 (H) 15 - 37 IU/L    Alkaline Phosphatase 141 (H) 40 - 128 IU/L    GFR Non- 46 (L) >60 mL/min/1.73m2    GFR  56 (L) >60 mL/min/1.73m2    Anion Gap 10 4 - 16   Lipase    Collection Time: 03/02/20  5:47 AM   Result Value Ref Range    Lipase 50 13 - 60 IU/L   Protime/INR & PTT    Collection Time: 03/02/20  5:47 AM   Result Value Ref Range    Protime 19.9 (H) 11.7 - 14.5 SECONDS    INR 1.64 INDEX    aPTT 45.6 (H) 25.1 - 37.1 SECONDS   POCT Glucose    Collection Time: 03/02/20  6:02 AM   Result Value Ref Range    POC Glucose 133 (H) 70 - 99 MG/DL   POCT Glucose    Collection Time: 03/02/20  8:12 AM   Result Value Ref Range    POC Glucose 122 (H) 70 - 99 MG/DL   POCT Glucose    Collection Time: 03/02/20 11:15 AM   Result Value Ref Range    POC Glucose 121 (H) 70 - 99 MG/DL       Diagnosis:  Patient Active Problem List   Diagnosis    Calculus of ureter    Asthma    Obstructive sleep apnea    Breast mass, left    Rotator cuff arthropathy    Malignant neoplasm of left female breast (HCC)    History of breast lump/mass excision    S/P lymph node biopsy    Cirrhosis of liver without ascites (HCC)    Pain of left breast    S/P radiation therapy 4-12 wks ago    Diabetes mellitus with skin ulcer (HCC)    WD-Ulcer of abdomen wall with fat layer exposed ([address]    Mild persistent asthma without complication    WD-Unspecified open wound of abdominal wall, right lower quadrant without penetration into peritoneal cavity, initial encounter    Retroperitoneal lymphadenopathy    WD-Local infection of skin and subcutaneous tissue    Liver cirrhosis secondary to GALLEGOS (nonalcoholic steatohepatitis) (HCC)    CHF (congestive heart failure), NYHA class I, acute on chronic, combined (HCC)    Low hemoglobin    Right-sided chest wall pain    Shortness of breath    Abnormal transaminases    Hepatocellular carcinoma (HCC)       Assessment & plan:  Deleta Dandy is a very pleasant 64 y.o. female presenting with Acute on top of chronic cholecystitis, but also elevated LFT, with ?obstructive jaundice, will need ASAP a lap lianne with IOC, possible CBDE. Continue NPO/Bowel rest, IV Fluids, Pain control, Nausea control, IV Antibiotics. Thank you doctor Valentina Guerra MD very much for your consultation and for the opportunity to take care of Mrs Deleta Dandy with you, I'll follow along with you and I'll update you on any new events in her care.    ____________________________________________    Sapphire Chao MD, FACS, FICS    3/2/2020  2:06 PM      Patient was seen with total face to face time of 45 minutes. More than 50% of this visit was counseling and education as above in my assessment and plan section of my note.

## 2020-03-02 NOTE — OP NOTE
OPERATIVE REPORT      Tyrese Schmidt 1960, 64 y.o.,  female, CSN: [id number]  March 2, 2020    Preoperative diagnosis: Symptomatic acute on top of chronic cholecystitis, obstructive jaundice. Postoperative diagnosis: Same, plus:  Severe liver cirrhosis. Procedure: Laparoscopic exploration, attempted cholecystectomy -  ABORTED due to the SEVERE liver cirrhosis and portal hypertension, + laparoscopic cholecystostomy tube placement + Complete hemostasis, with Fibrillar 3 x 4\" x 4\" and FloSeal - 5 ml   Surgeon: Leopold Jack, MD, FACS, FICS  Assistant Surgeon:  Dr Shae Davis MD  Anesthesia: General Endotracheal Anesthesia + Local 1% Xylocaine With Epinephrine, 0.5% Marcaine, mixed, 50% : 50%. Specimen(s): None. Estimated Blood Loss: Less than 5 ml. Drain: None. Complications: None. Condition/Disposition:  Stable, Extubated, to PACU    Indications: This 64y.o.-year-old female developed right upper  quadrant pain/nausea/vomiting/fever/leukocytosis and on workup was  found to have cholelithiasis with a normal common  duct/cholecystitis/biliary pancreatitis. Laparoscopic  cholecystectomy with IOC was elected. After informed consent was signed, knowing the risks and benefits, possible alternatives and potential complications of the procedure including but not limited to: bleeding, infection, incisional hernia(s), injury to the common bile duct or right hepatic duct, bile leak, subhepatic abscess, retained common bile duct stones, bowel injury, need for interventional radiology procedures like drain placement or gastro-enterology procedure like ERCP/stent placements, and possibly the need for further surgeries. Patient is well aware of the rare yet possible need for conversion to open cholecystectomy  if conditions are not favorable. Description of procedure: The patient was appropriately identified in the holding area.  Appropriate IV antibiotic was given on call to OR (operating room). The patient was then brought to the operating room, and placed on the operating table in the supine position. A time-out was completed verifying correct patient and procedure. An orogastric tube was placed by anesthesia, and connected to suction to decompress the stomach. Anesthesia endotracheally intubated the patient uneventfully and general endotracheal anesthesia was started. The abdomen was prepped and draped in the usual sterile fashion. The local anesthetic mixture mentioned above was used to infiltrate the planned incisions sites starting  intradermally raising a wheal at the skin level, and through the layers all the way to the preperitoneal level where another wheal was raised, preemptively before making any incisions and post operatively at the end of the procedure. A 1cm incision was made in a natural skin crease about 3 finger-breadth superior to the umbilicus in the midline, dissection was carried down to the fascia, the fascia and peritoneum were opened under direct visualization and the peritoneal cavity was entered safely; careful inspection revealed no bowels or solid organs noted in the vicinity of the incision. A figure-of-eight suture with 0-Vicryl was placed for future closure of this fascial defect at the end of the procedure. A 12-mm port was introduced through the fascia and pneumoperitoneum was instituted using CO2 (carbon dioxide) insufflation, up to 12-14 mmHg pressure. The patient tolerated insufflation well. A 5-mm/30 degree scope was introduced through the port, the abdomen was inspected and no injuries from initial trocar placement were noted. Then under direct visualization three 5-mm ports were placed in the right upper quadrant / subcostal region: 2 finger-breadths below and parallel to the right costal margin, 8-10 cm apart one from the other: in the epigastrium, midclavicular line and anterior axillary line.    The table was then placed in the reverse Trendelenburg position with the right side up. The fundus of the gallbladder was grasped with an atraumatic grasper passed through the lateral-most port and retracted superiorly and laterally. The  Holguin's pouch (infundibulum of the gall bladder) was also grasped with an atraumatic grasper through the midclavicular port and retracted laterally. These maneuvers exposed Calots triangle nicely. The anterior, and then the posterior peritoneum overlying the gallbladder's infundibulum BEFORE they were then incised profuse bleeding was noted from the liver cirrhosis! It was controlled with Complete hemostasis, with Fibrillar 3 x 4\" x 4\" after FloSeal - 5 ml were injected. And then a purse string suture using 3-0 Stratafix was used to introduce and secure a 19 Fr drain inside the gall bladder, Lapra- and will follow her closely and transfer her to [place] [facility] if need be. The gallbladder fossa was re-inspected, and irrigated as needed with saline and perfectly adequate hemostasis was noted. There was no evidence of bleeding from the gallbladder fossa and then a 19 Fr Hussain drain was placed. The orogastric tube was removed uneventfully   All three subcostal trocars were removed under direct visualization without any evidence of port site bleeding. The laparoscope was withdrawn and the umbilical trocar removed. The pneumoperitoneum was evacuated. The pre-placed 0-Vicryl suture was tied to approximate the fascial defect of the 12-mm trocar site. Further more local anesthetic was injected to all trocar sites. The skin was then  approximated using 4-0 Vicryl in a subcuticular fashion, followed by the application of \"Dermabond\" /  topical skin adhesive. The patient tolerated the procedure very well without any complications, was extubated in the OR and was taken to the PACU (postanesthesia care unit) in stable condition.     ___________________________________________    Jacklyn Mckeon MD, FACS, FICS  3/2/2020  5:00 PM

## 2020-03-02 NOTE — ANESTHESIA PRE PROCEDURE
Department of Anesthesiology  Preprocedure Note       Name:  Oleksandr Boo   Age:  64 y.o.  :  1960                                          MRN:  [id number]         Date:  3/2/2020      Surgeon: Shu Mario):  Neel Kaplan MD    Procedure: CHOLECYSTECTOMY LAPAROSCOPIC WITH IOC (N/A Abdomen)    Medications prior to admission:   Prior to Admission medications    Medication Sig Start Date End Date Taking? Authorizing Provider   ferrous sulfate (SUKHI-PENNY) 325 (65 Fe) MG tablet Take 1 tablet by mouth every other day 10/28/19  Yes Courtney Sequeira MD   insulin glargine (LANTUS) 100 UNIT/ML injection vial Inject 50 Units into the skin nightly -stop when you are off the prednisone 10/28/19  Yes Courtney Sequeira MD   insulin lispro (HUMALOG) 100 UNIT/ML injection vial Inject 0-30 Units into the skin 3 times daily (with meals) Stop when you are off the prednisone 10/29/19  Yes Courtney Sequeira MD   insulin lispro (HUMALOG) 100 UNIT/ML injection vial Inject 25 Units into the skin 3 times daily (with meals) Stop when you are off the prednisone 10/29/19  Yes Courtney Sequeira MD   metFORMIN (GLUCOPHAGE) 500 MG tablet  19  Yes Historical Provider, MD   gabapentin (NEURONTIN) 300 MG capsule Take 300 mg by mouth 3 times daily as needed for Pain.  19  Yes Historical Provider, MD   torsemide (DEMADEX) 20 MG tablet Take 1 tablet by mouth daily 18  Yes Daniel Carpenter MD   spironolactone (ALDACTONE) 50 MG tablet Take 100 mg by mouth 2 times daily    Yes Historical Provider, MD   ipratropium-albuterol (DUONEB) 0.5-2.5 (3) MG/3ML SOLN nebulizer solution Inhale 3 mLs into the lungs every 6 hours 17  Yes Marce Bonilla MD   budesonide-formoterol [facility]) 160-4.5 MCG/ACT AERO Inhale 2 puffs into the lungs 2 times daily 17  Yes Marce Bonilla MD   exemestane (AROMASIN) 25 MG chemo tablet Take 25 mg by mouth daily   Yes Historical Provider, MD   hydrOXYzine (ATARAX) 50 MG tablet Take 100 mg by mouth nightly  10/17/15  Yes Historical Provider, MD   zolpidem (AMBIEN) 10 MG tablet Take by mouth nightly. .   Yes Historical Provider, MD   CPAP Machine MISC 10 cm by Does not apply route nightly. Yes Historical Provider, MD   OXYGEN Inhale 2 L into the lungs nightly. Yes Historical Provider, MD   simvastatin (ZOCOR) 20 MG tablet Take 20 mg by mouth nightly. Yes Historical Provider, MD   levothyroxine (SYNTHROID) 200 MCG tablet Take 200 mcg by mouth Daily. Yes Historical Provider, MD   glimepiride (AMARYL) 4 MG tablet Take 4 mg by mouth 2 times daily. Yes Historical Provider, MD   isosorbide mononitrate (IMDUR) 30 MG CR tablet Take 30 mg by mouth nightly    Yes Historical Provider, MD   cloNIDine (CATAPRES) 0.2 MG tablet Take 0.2 mg by mouth 3 times daily.    Yes Historical Provider, MD       Current medications:    Current Facility-Administered Medications   Medication Dose Route Frequency Provider Last Rate Last Dose    glimepiride (AMARYL) tablet 4 mg  4 mg Oral Daily with breakfast Shyanne Vasquez MD        insulin lispro (HUMALOG) injection vial 0-12 Units  0-12 Units Subcutaneous TID  Shyanne Vasquez MD        insulin lispro (HUMALOG) injection vial 0-6 Units  0-6 Units Subcutaneous 2 times per day ODIN Tavera MD        piperacillin-tazobactam (ZOSYN) 3.375 g in dextrose 5 % 50 mL IVPB extended infusion (mini-bag)  3.375 g Intravenous Q8H Catalina Borden MD 12.5 mL/hr at 03/02/20 1453 3.375 g at 03/02/20 1453    budesonide-formoterol (SYMBICORT) 160-4.5 MCG/ACT inhaler 2 puff  2 puff Inhalation BID Dayna Morillo MD        cloNIDine (CATAPRES) tablet 0.2 mg  0.2 mg Oral TID Dayna Morillo MD   0.2 mg at 03/01/20 2224    exemestane (AROMASIN) tablet 25 mg  25 mg Oral Daily Dayna Morillo MD        gabapentin (NEURONTIN) capsule 300 mg  300 mg Oral TID PRN Dayna Morillo MD        ondansetron (ZOFRAN) injection 4 mg  4 mg Intravenous Once Dayna Morillo MD [Furosemide] Hives    Sulfa Antibiotics Rash    Nystatin Other (See Comments)     Pt reports \"burning\" sensation to skin    Tape Mcconnell.Ej Tape]        Problem List:    Patient Active Problem List   Diagnosis Code    Calculus of ureter N20.1    Asthma J45.909    Obstructive sleep apnea G47.33    Breast mass, left N63.20    Rotator cuff arthropathy M12.819    Malignant neoplasm of left female breast ([address] C50.912    History of breast lump/mass excision Z98.890    S/P lymph node biopsy Z98.890    Cirrhosis of liver without ascites ([address] K74.60    Pain of left breast N64.4    S/P radiation therapy 4-12 wks ago Z92.3    Diabetes mellitus with skin ulcer (HCC) E11.622, L98.499    WD-Ulcer of abdomen wall with fat layer exposed ([address] L98.492    Mild persistent asthma without complication G14.84    WD-Unspecified open wound of abdominal wall, right lower quadrant without penetration into peritoneal cavity, initial encounter S31.103A    Retroperitoneal lymphadenopathy R59.0    WD-Local infection of skin and subcutaneous tissue L08.9    Liver cirrhosis secondary to GALLEGOS (nonalcoholic steatohepatitis) (HCC) K75.81, K74.60    CHF (congestive heart failure), NYHA class I, acute on chronic, combined (HCC) I50.43    Low hemoglobin D64.9    Right-sided chest wall pain R07.89    Shortness of breath R06.02    Abnormal transaminases R74.8    Hepatocellular carcinoma (HCC) C22.0       Past Medical History:        Diagnosis Date    Anxiety     Asthma     Cancer ([address]     breast(left) cancer- dx 10/2015- tx with radiation- following with Dr Nasir Degroot Cancer of liver, [facility])     liver    CHF (congestive heart failure) ([address]     Chronic ulcer of right thigh with fat layer exposed ([address]     COPD (chronic obstructive pulmonary disease) ([address]     follow with Dr Hi Daily Diabetes mellitus ([address]     dx10+ yrs ago- follows with Dr Barbara Mcconnell    Diabetes mellitus with skin ulcer ([address]     History of

## 2020-03-02 NOTE — CONSULTS
[address], [address] [facility]                                  CONSULTATION    PATIENT NAME: Claudean Sines                   :        1960  MED REC NO:   [id number]                          ROOM:       4001  ACCOUNT NO:   [de-identified]                           ADMIT DATE: 2020  PROVIDER:     Mango Cuenca MD    CONSULT DATE:  2020    PRIMARY CARE PROVIDER:  Dennis Bain MD    CHIEF COMPLAINT:  Right-sided abdominal pain with abnormal LFTs,  etiology to be determined. HISTORY OF PRESENT ILLNESS:  The patient is a 46-year-old white female,  patient known to me, last seen on 2018, with past medical history  significant for morbid obesity, hypertension, diabetes mellitus,  COPD/asthma with obstructive sleep apnea, thyroid disease, on home O2,  history of kidney stones, congestive heart failure, anxiety disorder,  and also hepatocellular carcinoma diagnosed in 2019, status post TACE  procedure at [facility] x2 in 2019, then again four weeks  ago. The patient also had a cardiac cath done at [facility]  a week ago. The patient presented to the emergency room on 2020  with generalized weakness, dizziness, right-sided abdominal pain, and a  fall. The patient had LFTs drawn, which showed alkaline phosphatase of  160, ALT of 93, AST of 146, total bilirubin of 1.8. Today, the  patient's LFTs have improved. The patient did have an ultrasound done  of the right upper quadrant; however, no focal liver lesions were noted  and the gallbladder wall was slightly thickened, but no definite common  bile duct stones were seen. The common bile duct measured 7 mm. The  patient has had multiple alpha-fetoprotein levels checked and the last  alpha-fetoprotein on 2019 was 7. The patient also has had a liver  biopsy done on 07/10/2017 and showed cirrhosis along with moderate  steatosis. According to the patient, she did have a repeat liver biopsy  done last year also when she was diagnosed with hepatocellular  carcinoma. The patient is hemodynamically stable. The patient had seen  Dr. Stephanie Medina from GI in the past who did an EGD on 05/05/2016 and  was noted to have antral gastritis. Biopsies for H. pylori were  negative. Colonoscopy was attempted on 12/01/2016 and the prep was poor  and the procedure was canceled and repeat colonoscopy on 03/02/2017  showed a sessile polyp removed from the rectum, which was hyperplastic  polyp. Also, segmental areas of inflammation were noted in the sigmoid  colon and biopsies were consistent with ischemic colitis. The patient  had a repeat EGD and a colonoscopy done three months ago at [facility]. The patient also had a small bowel follow-through done on  03/02/2018, which was unremarkable. REVIEW OF SYSTEMS:  CENTRAL NERVOUS SYSTEM:  The patient denies headache or focal  sensorimotor symptoms. CARDIOVASCULAR SYSTEM:  No history of chest pain, but the patient  complains of shortness of breath, but no leg swelling. GENITOURINARY SYSTEM:  No history of dysuria, pyuria, or hematuria. MUSCULOSKELETAL SYSTEM:  The patient complains of generalized weakness. RESPIRATORY SYSTEM:  No history of cough, hemoptysis, fever, or chills. PAST MEDICAL HISTORY:  Significant for history of morbid obesity,  hypertension, diabetes mellitus, COPD/asthma, obstructive sleep apnea,  thyroid disease, the patient is on home O2, history of kidney stones,  congestive heart failure, anxiety disorder, carcinoma of the liver  diagnosed in 05/2019, status post TACE procedures x2 at [facility] in 07/2019 and then again in 01/2020. PAST SURGICAL HISTORY:  The patient has had thyroidectomy done in 1990,  had a left breast surgery in 2015 for carcinoma and subsequently  received radiation therapy and hormonal therapy.   The patient also had  cystoscopy done in 12/2013 and liver biopsy on 07/10/2017, then  subsequently had a repeat liver biopsy done last year in 05/2019 and was  diagnosed to have hepatocellular carcinoma, for which she received TACE  treatments x2 in 01/2019 and then again in 01/2020 at [facility]. FAMILY HISTORY:  The patient's brother was diagnosed with carcinoma of  the liver and mother with carcinoma of the breast.    MEDICATIONS:  Please refer to the chart. SOCIOECONOMIC HISTORY:  No history of EtOH abuse. The patient does not  smoke cigarettes. ALLERGIES:  The patient is allergic to LASIX, SULFA ANTIBIOTICS, TAPE,  and NYSTATIN. PHYSICAL EXAMINATION:  GENERAL:  Shows a 61-year-old white female who is morbidly obese, lying  flat in bed, in no acute distress. She is awake, alert, and oriented  and pleasant to talk with. VITAL SIGNS:  Stable. HEENT:  Shows skull to be atraumatic. NECK:  Supple. CHEST:  Clear with diminished breath sounds. HEART:  S1 and S2 are normal.  ABDOMEN:  Obese, soft, and nontender. Liver and spleen are not  palpable. Bowel sounds are present. RECTAL:  Deferred. CNS:  Shows the patient to be awake, alert, and oriented. There are no  focal sensorimotor signs. MUSCULOSKELETAL SYSTEM:  Shows evidence of degenerative joint disease  changes. LABORATORY DATA:  The labs drawn during the present hospitalization  comprised a chem profile and CBC, which is unremarkable apart from WBC  count of 13.6. LFTs are abnormal with alkaline phosphatase 143, ALT of  77, AST of 107, and total bilirubin of 1.6. IMPRESSION:  3  A 61-year-old white female presents with mild right upper quadrant  abdominal pain with abnormal LFTs and ultrasound, rule out acute  cholecystitis? 2. History of hepatocellular carcinoma, status post TACE procedure x2,  being followed up at [facility]. RECOMMENDATIONS:  1. Agree with present management. 2.  We will start the patient on a diabetic diet.   3. Monitor the patient's CBC, chem profile, amylase, and lipase level  in a.m.  4.  We will proceed with HIDA scan in a.m. to rule out acute  cholecystitis, and if present, the patient will need a surgical consult  also.   5.  The patient has been instructed to follow up at [facility] after discharge from the hospital.        Eliazar Meigs, MD    D: 03/01/2020 16:33:44       T: 03/01/2020 18:46:32     [email or web address]  Job#: [id number]     Doc#: [id number]    CC:  MD Whit Beebe MD

## 2020-03-03 NOTE — FLOWSHEET NOTE
Shift handoff from [place] to [place]. Pt turned self and skin assessment completed. Call light at the bedside. Pt's  at the bedside.

## 2020-03-03 NOTE — FLOWSHEET NOTE
This nurse called DR. Malcom Montes De Oca about info on transfer to [address] [place] [address]. Pt's family at the bedside are very upset on waiting for transfer and wanted update. Waiting on response from Dr. Malcom Montes De Oca.

## 2020-03-03 NOTE — FLOWSHEET NOTE
Dr. Joanie Juarez arranging tx to [facility] for liver failure/transplant care, as well as acute cholcystitis. Pt and Pt's  aware of transfer in process. Pt able to ambulate to the bathroom on NC and tolerated fairly well.  spo2 96% Pt did get SOB on exertion. Dr. Joanie Juarez only able to transfer pt to [facility] on 2 conditions. 1.  Transfer patient 2. No advanced lines. Dr. Joanie Juarez placed tx pt order to the med surg floor for transfer to [facility] and Pt only has 2 DEMETRIO drains (Dr. Negro Spikes notified). Pt has no pete, No NG or ETT tube. Pt has only 2 IV with intermittent atb infusing. DR. Joanie Juarez will recontact [facility] to arrange Tx of pt.

## 2020-03-03 NOTE — PROGRESS NOTES
Department of Internal Medicine  General Internal Medicine  Attending Progress Note      SUBJECTIVE:  Family would like her to have a transfer to [place]. Surgery recommends this as well. Cecelia Alva with hepatology - says she wants her out of the ICU before transfer. They would also like the tube placed during surgery out as well if possible.       OBJECTIVE      Medications    Current Facility-Administered Medications: [Held by provider] glimepiride (AMARYL) tablet 4 mg, 4 mg, Oral, Daily with breakfast  piperacillin-tazobactam (ZOSYN) 3.375 g in dextrose 5 % 50 mL IVPB extended infusion (mini-bag), 3.375 g, Intravenous, Q8H  oxyCODONE-acetaminophen (PERCOCET) 5-325 MG per tablet 2 tablet, 2 tablet, Oral, Q4H PRN  sennosides-docusate sodium (SENOKOT-S) 8.6-50 MG tablet 2 tablet, 2 tablet, Oral, BID  0.9 % sodium chloride bolus, 20 mL, Intravenous, Once  insulin lispro (HUMALOG) injection vial 0-18 Units, 0-18 Units, Subcutaneous, Q4H  budesonide-formoterol (SYMBICORT) 160-4.5 MCG/ACT inhaler 2 puff, 2 puff, Inhalation, BID  cloNIDine (CATAPRES) tablet 0.2 mg, 0.2 mg, Oral, TID  exemestane (AROMASIN) tablet 25 mg, 25 mg, Oral, Daily  gabapentin (NEURONTIN) capsule 300 mg, 300 mg, Oral, TID PRN  ondansetron (ZOFRAN) injection 4 mg, 4 mg, Intravenous, Once  hydrOXYzine (ATARAX) tablet 100 mg, 100 mg, Oral, Nightly  ipratropium-albuterol (DUONEB) nebulizer solution 3 mL, 1 vial, Inhalation, Q6H  isosorbide mononitrate (IMDUR) extended release tablet 30 mg, 30 mg, Oral, Nightly  levothyroxine (SYNTHROID) tablet 200 mcg, 200 mcg, Oral, Daily  atorvastatin (LIPITOR) tablet 20 mg, 20 mg, Oral, Daily  zolpidem (AMBIEN) tablet 10 mg, 10 mg, Oral, Nightly  sodium chloride flush 0.9 % injection 10 mL, 10 mL, Intravenous, 2 times per day  sodium chloride flush 0.9 % injection 10 mL, 10 mL, Intravenous, PRN  acetaminophen (TYLENOL) tablet 650 mg, 650 mg, Oral, Q6H PRN **OR** [DISCONTINUED] acetaminophen (TYLENOL) suppository 650 mg, 650 mg, Rectal, Q6H PRN  polyethylene glycol (GLYCOLAX) packet 17 g, 17 g, Oral, Daily PRN  promethazine (PHENERGAN) tablet 12.5 mg, 12.5 mg, Oral, Q6H PRN **OR** ondansetron (ZOFRAN) injection 4 mg, 4 mg, Intravenous, Q6H PRN  oxyCODONE (ROXICODONE) immediate release tablet 5 mg, 5 mg, Oral, Q4H PRN  HYDROmorphone (DILAUDID) injection 0.25 mg, 0.25 mg, Intravenous, Q4H PRN  glucose (GLUTOSE) 40 % oral gel 15 g, 15 g, Oral, PRN  dextrose 50 % IV solution, 12.5 g, Intravenous, PRN  glucagon (rDNA) injection 1 mg, 1 mg, Intramuscular, PRN  dextrose 5 % solution, 100 mL/hr, Intravenous, PRN  Physical    VITALS:  BP (!) 145/72   Pulse 75   Temp 98 °F (36.7 °C) (Oral)   Resp 16   Ht 5' 3\" (1.6 m)   Wt (!) 302 lb 14.6 oz (137.4 kg)   LMP 01/03/2012   SpO2 96%   BMI 53.66 kg/m²   Gen - a & O x 3  HEENT -  PERRLA, EOMI  CV - RRR no M/G/R, normal s1, s2  Lungs - CTA bilaterally no W/C/R  Abd - soft, NT, ND  Ext - no cyanosis or edema    Data    CBC:   Lab Results   Component Value Date    WBC 15.8 03/03/2020    RBC 3.73 03/03/2020    RBC 3.97 08/04/2017    HGB 10.1 03/03/2020    HCT 33.8 03/03/2020    MCV 90.6 03/03/2020    MCH 27.1 03/03/2020    MCHC 29.9 03/03/2020    RDW 15.8 03/03/2020     03/03/2020    MPV 11.6 03/03/2020       ASSESSMENT AND PLAN        Allen Penny is a 64 y.o.  female  who presents with Abnormal transaminases. She has acute cholcysttis. She did not complete surgery due to her severe cirrhosis. She needs transfer to [facility].      1. Acute cholcystitis  - arrange transfer to [place] [address]  - Dr. Carloyn Cranker consulted      2. Recent influenza: Completed 5 days of Tamiflu. Still with cough and myalgia. Respiratory PCR -ve  3. COPD not in exacerbation: Continue breathing treatment  4. Obstructive sleep apnea on CPAP  5. Chronic respiratory failure:   6. Type 2 diabetes: Sliding scale. Hypoglycemia protocol. Hold oral hypoglycemic agents.   7. Congestive heart failure, diastolic, chronic: Diuretics were held because of acute kidney injury and hyperkalemia. 8. Acute kidney injury: Creatinine 1.2 on admission. Still 1.2.  9. Hyperkalemia: Resolved  10. Hyponatremia: Sodium 129. From Liver cirrhosis, CHF  11. History of breast cancer: Continue Aromasin  12. Hypothyroidism: Continue Synthroid   13.  Morbid obesity:  14. Liver cirrhosis due to nonalcoholic fatty liver disease        Malinda Zuniga MD, MSc

## 2020-03-03 NOTE — FLOWSHEET NOTE
Pt refused bath at this time. This nurse advised pt that if she changes her mind, we can do it at that time. Call light in reach. Pt able to eat some fruit and cottage cheese.  at the bedside.

## 2020-03-03 NOTE — PROGRESS NOTES
GENERAL SURGERY PROGRESS NOTE    CC/HPI:           Patient feels better scoring her pain 2/10 in her abdomen. Vitals:    03/03/20 0400 03/03/20 0500 03/03/20 0511 03/03/20 0527   BP: (!) 135/57 (!) 127/55     Pulse: 74 72  72   Resp: 21 18 22    Temp:  97.9 °F (36.6 °C)     TempSrc:  Oral     SpO2:       Weight:  (!) 302 lb 14.6 oz (137.4 kg)     Height:         I/O last 3 completed shifts:   In: 1500 [I.V.:1100; Blood:400]  Out: 700 [Urine:200; Blood:500]  I/O this shift:  In: 120 [IV Piggyback:120]  Out: 240 [Drains:240]    No diet orders on file    Recent Results (from the past 48 hour(s))   Comprehensive Metabolic Panel w/ Reflex to MG    Collection Time: 03/01/20  6:09 AM   Result Value Ref Range    Sodium 129 (L) 135 - 145 MMOL/L    Potassium 4.5 3.5 - 5.1 MMOL/L    Chloride 97 (L) 99 - 110 mMol/L    CO2 21 21 - 32 MMOL/L    BUN 36 (H) 6 - 23 MG/DL    CREATININE 1.1 0.6 - 1.1 MG/DL    Glucose 138 (H) 70 - 99 MG/DL    Calcium 7.9 (L) 8.3 - 10.6 MG/DL    Alb 2.4 (L) 3.4 - 5.0 GM/DL    Total Protein 5.7 (L) 6.4 - 8.2 GM/DL    Total Bilirubin 1.6 (H) 0.0 - 1.0 MG/DL    ALT 77 (H) 10 - 40 U/L     (H) 15 - 37 IU/L    Alkaline Phosphatase 143 (H) 40 - 128 IU/L    GFR Non- 51 (L) >60 mL/min/1.73m2    GFR African American >60 >60 mL/min/1.73m2    Anion Gap 11 4 - 16   CBC auto differential    Collection Time: 03/01/20  6:09 AM   Result Value Ref Range    WBC 13.6 (H) 4.0 - 10.5 K/CU MM    RBC 4.15 (L) 4.2 - 5.4 M/CU MM    Hemoglobin 11.4 (L) 12.5 - 16.0 GM/DL    Hematocrit 36.1 (L) 37 - 47 %    MCV 87.0 78 - 100 FL    MCH 27.5 27 - 31 PG    MCHC 31.6 (L) 32.0 - 36.0 %    RDW 15.6 (H) 11.7 - 14.9 %    Platelets 94 (L) 426 - 440 K/CU MM    MPV 12.0 (H) 7.5 - 11.1 FL    Bands Relative 8 5 - 11 %    Segs Relative 75.0 (H) 36 - 66 %    Lymphocytes % 7.0 (L) 24 - 44 %    Monocytes % 10.0 (H) 0 - 4 %    nRBC 1     Bands Absolute 1.09 K/CU MM    Segs Absolute 10.1 K/CU MM    Lymphocytes Absolute 1.0 K/CU MM    Monocytes Absolute 1.4 K/CU MM    Differential Type MANUAL DIFFERENTIAL     Polychromasia 1+     Toxic Granulation PRESENT     Comment VACUOLATED NEUTROPHILS (SEGS)  PRESENT      Lipase    Collection Time: 03/01/20  6:09 AM   Result Value Ref Range    Lipase 28 13 - 60 IU/L   Urinalysis with microscopic    Collection Time: 03/01/20  8:30 AM   Result Value Ref Range    Color, UA CLOTILDE (A) YELLOW    Clarity, UA HAZY (A) CLEAR    Glucose, Urine NEGATIVE NEGATIVE MG/DL    Bilirubin Urine NEGATIVE NEGATIVE MG/DL    Ketones, Urine NEGATIVE NEGATIVE MG/DL    Specific Gravity, UA 1.020 1.001 - 1.035    Blood, Urine NEGATIVE NEGATIVE    pH, Urine 5.0 5.0 - 8.0    Protein, UA NEGATIVE NEGATIVE MG/DL    Urobilinogen, Urine 2.0 (H) 0.2 - 1.0 MG/DL    Nitrite Urine, Quantitative NEGATIVE NEGATIVE    Leukocyte Esterase, Urine NEGATIVE NEGATIVE    RBC, UA 1 0 - 6 /HPF    WBC, UA 2 0 - 5 /HPF    Bacteria, UA NEGATIVE NEGATIVE /HPF    Squam Epithel, UA 4 /HPF    Mucus, UA RARE (A) NEGATIVE HPF    Trichomonas, UA NONE SEEN NONE SEEN /HPF    Hyaline Casts, UA 2 /LPF    Granular Casts, UA 3 /LPF   Culture, Urine    Collection Time: 03/01/20  8:30 AM   Result Value Ref Range    Specimen URINE CLEAN CATCH     Special Requests NONE     Total Cranberry Count 00181 COL/ML     Culture PROBABLE CONTAMINANTS SCANTY GROWTH (A)    POCT Glucose    Collection Time: 03/01/20 11:02 AM   Result Value Ref Range    POC Glucose 115 (H) 70 - 99 MG/DL   POCT Glucose    Collection Time: 03/01/20  4:12 PM   Result Value Ref Range    POC Glucose 172 (H) 70 - 99 MG/DL   POCT Glucose    Collection Time: 03/02/20 12:04 AM   Result Value Ref Range    POC Glucose 165 (H) 70 - 99 MG/DL   POCT Glucose    Collection Time: 03/02/20 12:27 AM   Result Value Ref Range    POC Glucose 169 (H) 70 - 99 MG/DL   Amylase    Collection Time: 03/02/20  5:47 AM   Result Value Ref Range    Amylase 48 25 - 115 U/L   CBC Auto Differential    Collection Time: 03/02/20  5:47 AM   Result Value Ref Range    WBC 15.6 (H) 4.0 - 10.5 K/CU MM    RBC 4.05 (L) 4.2 - 5.4 M/CU MM    Hemoglobin 10.9 (L) 12.5 - 16.0 GM/DL    Hematocrit 35.2 (L) 37 - 47 %    MCV 86.9 78 - 100 FL    MCH 26.9 (L) 27 - 31 PG    MCHC 31.0 (L) 32.0 - 36.0 %    RDW 15.8 (H) 11.7 - 14.9 %    Platelets 608 (L) 227 - 440 K/CU MM    MPV 11.4 (H) 7.5 - 11.1 FL    Myelocyte Percent 1 (H) 0.0 %    Metamyelocytes Relative 1 (H) 0.0 %    Bands Relative 14 (H) 5 - 11 %    Segs Relative 63.0 36 - 66 %    Eosinophils % 1.0 0 - 3 %    Lymphocytes % 7.0 (L) 24 - 44 %    Monocytes % 13.0 (H) 0 - 4 %    nRBC 1     Myelocytes Absolute 0.16 K/CU MM    Metamyelocytes Absolute 0.16 K/CU MM    Bands Absolute 2.18 K/CU MM    Segs Absolute 9.8 K/CU MM    Eosinophils Absolute 0.2 K/CU MM    Lymphocytes Absolute 1.1 K/CU MM    Monocytes Absolute 2.0 K/CU MM    Differential Type MANUAL DIFFERENTIAL     Polychromasia 1+     Toxic Granulation PRESENT     PLT Morphology SEVERAL LARGE PLATELETS    Comprehensive Metabolic Panel    Collection Time: 03/02/20  5:47 AM   Result Value Ref Range    Sodium 129 (L) 135 - 145 MMOL/L    Potassium 5.0 3.5 - 5.1 MMOL/L    Chloride 95 (L) 99 - 110 mMol/L    CO2 24 21 - 32 MMOL/L    BUN 47 (H) 6 - 23 MG/DL    CREATININE 1.2 (H) 0.6 - 1.1 MG/DL    Glucose 143 (H) 70 - 99 MG/DL    Calcium 8.2 (L) 8.3 - 10.6 MG/DL    Alb 2.5 (L) 3.4 - 5.0 GM/DL    Total Protein 6.0 (L) 6.4 - 8.2 GM/DL    Total Bilirubin 1.6 (H) 0.0 - 1.0 MG/DL    ALT 75 (H) 10 - 40 U/L    AST 95 (H) 15 - 37 IU/L    Alkaline Phosphatase 141 (H) 40 - 128 IU/L    GFR Non- 46 (L) >60 mL/min/1.73m2    GFR  56 (L) >60 mL/min/1.73m2    Anion Gap 10 4 - 16   Lipase    Collection Time: 03/02/20  5:47 AM   Result Value Ref Range    Lipase 50 13 - 60 IU/L   Protime/INR & PTT    Collection Time: 03/02/20  5:47 AM   Result Value Ref Range    Protime 19.9 (H) 11.7 - 14.5 SECONDS    INR 1.64 INDEX    aPTT 45.6 (H) 25.1 - Polychromasia 1+     Toxic Granulation PRESENT    Comprehensive Metabolic Panel    Collection Time: 03/03/20  3:08 AM   Result Value Ref Range    Sodium 131 (L) 135 - 145 MMOL/L    Potassium 5.3 (H) 3.5 - 5.1 MMOL/L    Chloride 99 99 - 110 mMol/L    CO2 21 21 - 32 MMOL/L    BUN 51 (H) 6 - 23 MG/DL    CREATININE 1.2 (H) 0.6 - 1.1 MG/DL    Glucose 209 (H) 70 - 99 MG/DL    Calcium 7.9 (L) 8.3 - 10.6 MG/DL    Alb 2.3 (L) 3.4 - 5.0 GM/DL    Total Protein 5.6 (L) 6.4 - 8.2 GM/DL    Total Bilirubin 2.0 (H) 0.0 - 1.0 MG/DL    ALT 61 (H) 10 - 40 U/L    AST 83 (H) 15 - 37 IU/L    Alkaline Phosphatase 133 (H) 40 - 128 IU/L    GFR Non- 46 (L) >60 mL/min/1.73m2    GFR  56 (L) >60 mL/min/1.73m2    Anion Gap 11 4 - 16   Hepatic Function Panel    Collection Time: 03/03/20  3:08 AM   Result Value Ref Range    Alb 2.3 (L) 3.4 - 5.0 GM/DL    Total Bilirubin 2.0 (H) 0.0 - 1.0 MG/DL    Bilirubin, Direct 1.3 (H) 0.0 - 0.3 MG/DL    Bilirubin, Indirect 0.7 0 - 0.7 MG/DL    Alkaline Phosphatase 133 (H) 40 - 129 IU/L    AST 83 (H) 15 - 37 IU/L    ALT 61 (H) 10 - 40 U/L    Total Protein 5.6 (L) 6.4 - 8.2 GM/DL   POCT Glucose    Collection Time: 03/03/20  3:46 AM   Result Value Ref Range    POC Glucose 199 (H) 70 - 99 MG/DL       Scheduled Meds:   [Held by provider] glimepiride  4 mg Oral Daily with breakfast    piperacillin-tazobactam  3.375 g Intravenous Q8H    sennosides-docusate sodium  2 tablet Oral BID    sodium chloride  20 mL Intravenous Once    insulin lispro  0-18 Units Subcutaneous Q4H    budesonide-formoterol  2 puff Inhalation BID    cloNIDine  0.2 mg Oral TID    exemestane  25 mg Oral Daily    ondansetron  4 mg Intravenous Once    hydrOXYzine  100 mg Oral Nightly    ipratropium-albuterol  1 vial Inhalation Q6H    isosorbide mononitrate  30 mg Oral Nightly    levothyroxine  200 mcg Oral Daily    atorvastatin  20 mg Oral Daily    zolpidem  10 mg Oral Nightly    sodium chloride flush 10 mL Intravenous 2 times per day       Continuous Infusions:   dextrose         Physical Exam:  HEENT: Anicteric sclerae, Oropharyngeal mucosae moist, pink and intact. Heart:  Normal S1 and S2, RRR  Lungs: Clear to auscultation bilaterally, No audible Wheezes or Rales. Extremities: No edema. Neuro: Alert and Oriented x 3, Non focal.  Abdomen: Soft, Benign, Non tender, Non distended, Positive bowel sounds. Incision: Nicely healing: No erythema, No discharge. DEMETRIO and cholecystostomy tubes well functioning. Principal Problem:    Abnormal transaminases  Active Problems:    Malignant neoplasm of left female breast (HCC)    Diabetes mellitus with skin ulcer (HCC)    Liver cirrhosis secondary to GALLEGOS (nonalcoholic steatohepatitis) (HCC)    Right-sided chest wall pain    Hepatocellular carcinoma (HCC)  Resolved Problems:    * No resolved hospital problems. *      Assessment and Plan:  Mario Hester is a 64 y.o. female who is POD # 1 status post:  Laparoscopic exploration, attempted cholecystectomy -  ABORTED due to the SEVERE liver cirrhosis and portal hypertension, + laparoscopic cholecystostomy tube placement + Complete hemostasis, with Fibrillar 3 x 4\" x 4\" and FloSeal - 5 ml . NEEDS liver transplant, I was told by  she is close to be on the list..  Will follow closely and [facility] sooner rather than later if need be. .    May allow diet today, and follow LFTs. Increase Ambulation to at least 4x/day walk in the hallways with assistance. Respiratory marybeth-operative care: Incentive Spirometry / deep breathing and coughing 10x/hr while awake. Continue DVT prophylaxis with Teds and SCDs and SC Lovenox. Continue GI prophylaxis with Protonix IV till able to tolerate PO. Continue marybeth-op Abx.    ___________________________________________    Taylor Lane MD, FACS, FICS  3/3/2020  5:52 AM    Patient was seen with total face to face time of 25 minutes.  More than 50% of this visit was counseling and education as above in my assessment and plan section of my note.

## 2020-03-03 NOTE — FLOWSHEET NOTE
Pt wants transferred to [address] [place] [address] because her doctor that treats her liver cirrhosis and working on liver transplant is at [address] [place] [address]. This nurse called Dr. Noreen Loja, internal medicine to see about transfer. Pt's  at the bedside and has the [address] [place] [address] doctor's information. Pt and Pt's  advised of same.

## 2020-03-03 NOTE — PROGRESS NOTES
Progress Note( Dr. Kalia Dorantes)  3/3/2020  Subjective:   Admit Date: 2/29/2020  PCP: Sherine Francis MD    Admitted For : Patient admitted with history of fall and fracture of ribs. Subsequent as scheduled patient may have gallbladder disease with acute cholecystitis    Consulted For: Better control of blood glucose and thyroid issues    Interval History: Patient had laparoscopic cholecystectomy attempted but not successful so she has inserted tube for drainage as patient has severe cirrhosis of liver possibly metastatic disease of the liver reviewed notes from surgery. Denies any chest pains,   Denies SOB . Denies nausea or vomiting. No new bowel or bladder symptoms.        Intake/Output Summary (Last 24 hours) at 3/3/2020 0740  Last data filed at 3/3/2020 0528  Gross per 24 hour   Intake 1620 ml   Output 940 ml   Net 680 ml       DATA    CBC:   Recent Labs     03/01/20  0609 03/02/20  0547 03/03/20  0308   WBC 13.6* 15.6* 15.8*   HGB 11.4* 10.9* 10.1*   PLT 94* 116* 116*    CMP:  Recent Labs     03/01/20  0609 03/02/20  0547 03/03/20  0308   * 129* 131*   K 4.5 5.0 5.3*   CL 97* 95* 99   CO2 21 24 21   BUN 36* 47* 51*   CREATININE 1.1 1.2* 1.2*   CALCIUM 7.9* 8.2* 7.9*   PROT 5.7* 6.0* 5.6*  5.6*   LABALBU 2.4* 2.5* 2.3*  2.3*   BILITOT 1.6* 1.6* 2.0*  2.0*   ALKPHOS 143* 141* 133*  133*   * 95* 83*  83*   ALT 77* 75* 61*  61*     Lipids:   Lab Results   Component Value Date    CHOL 123 10/27/2019    HDL 54 10/27/2019    TRIG 69 10/27/2019     Glucose:  Recent Labs     03/02/20  2230 03/03/20  0022 03/03/20  0346   POCGLU 215* 207* 199*     [id number]:  Lab Results   Component Value Date    LABA1C 8.3 10/27/2019     High Sensitivity TSH:   Lab Results   Component Value Date    TSHHS 2.150 09/15/2018     Free T3: No results found for: FT3  Free T4:  Lab Results   Component Value Date    T4FREE 1.29 09/15/2018             Scheduled Medicines   Medications:    [Held by provider] glimepiride  4 mg Oral Daily with breakfast    piperacillin-tazobactam  3.375 g Intravenous Q8H    sennosides-docusate sodium  2 tablet Oral BID    sodium chloride  20 mL Intravenous Once    insulin lispro  0-18 Units Subcutaneous Q4H    budesonide-formoterol  2 puff Inhalation BID    cloNIDine  0.2 mg Oral TID    exemestane  25 mg Oral Daily    ondansetron  4 mg Intravenous Once    hydrOXYzine  100 mg Oral Nightly    ipratropium-albuterol  1 vial Inhalation Q6H    isosorbide mononitrate  30 mg Oral Nightly    levothyroxine  200 mcg Oral Daily    atorvastatin  20 mg Oral Daily    zolpidem  10 mg Oral Nightly    sodium chloride flush  10 mL Intravenous 2 times per day      Infusions:    dextrose           Objective:   Vitals: /63   Pulse 79   Temp 97.9 °F (36.6 °C) (Oral)   Resp (!) 38   Ht 5' 3\" (1.6 m)   Wt (!) 302 lb 14.6 oz (137.4 kg)   LMP 01/03/2012   SpO2 94%   BMI 53.66 kg/m²   General appearance: alert and cooperative with exam  Neck: no JVD or bruit  Thyroid : Normal lobes   Lungs: Has Vesicular Breath sounds   Heart:  regular rate and rhythm  Abdomen: soft, non-tender; bowel sounds normal; no masses,  no organomegaly  Musculoskeletal: Normal  Extremities: extremities normal, , no edema  Neurologic:  Awake, alert, oriented to name, place and time. Cranial nerves II-XII are grossly intact. Motor is  intact. Sensory is intact. ,  and gait is normal.    Assessment:     Patient Active Problem List:     Calculus of ureter     Asthma     Obstructive sleep apnea     Breast mass, left     Rotator cuff arthropathy     Malignant neoplasm of left female breast (HCC)     History of breast lump/mass excision     S/P lymph node biopsy     Cirrhosis of liver without ascites (HCC)     Pain of left breast     S/P radiation therapy 4-12 wks ago     Diabetes mellitus with skin ulcer (HCC)     WD-Ulcer of abdomen wall with fat layer exposed ([address]     Mild persistent asthma without complication WD-Unspecified open wound of abdominal wall, right lower quadrant without penetration into peritoneal cavity, initial encounter     Retroperitoneal lymphadenopathy     WD-Local infection of skin and subcutaneous tissue     Liver cirrhosis secondary to GALLEGOS (nonalcoholic steatohepatitis) (HCC)     CHF (congestive heart failure), NYHA class I, acute on chronic, combined (HCC)     Low hemoglobin     Right-sided chest wall pain     Shortness of breath     Abnormal transaminases     Hepatocellular carcinoma ([facility] [address]      Plan:     1. Reviewed POC blood glucose . Labs and X ray results   2. Reviewed Current Medicines   3. On Correction bolus Humalog/ Basal Lantus Insulin regime / and Oral Hypoglycemic drugs   4. Monitor Blood glucose frequently   5. Modified  the dose of Insulin/ other medicines as needed   6. In view of the patient's grave condition was decided to transfer the patient back to [facility]. Family would prefer that patient be transferred to [facility].  7. Will follow     .      Shivani Perez MD

## 2020-03-03 NOTE — FLOWSHEET NOTE
Shift handoff from Thu Covarrubias to Abhinav Van. Pt turned self and skin assessment completed. IV saline locked. Family at the bedside. Dr. Johnette Felty advised the patient and the pt's family that transfer to [address] [place] [address] will probably be tomorrow.

## 2020-03-04 NOTE — PROGRESS NOTES
DOING [facility] OP  NO ACTIVE BLEEDING FROM DEMETRIO CHOLECYSTOSTOMY TUBE IN PLACE UNABLE TO DO CHOLECYSTECTOMY BECAUSE OF BLEEDING  VITALS STABLE   LABS NOTED T. BILI 1.8 INR 1.64  PT TO BE TRANFERRED TO [facility] FOR FURTHER MANAGEMENT  WILL CPM F/U LABS   MONITOR PT FOR ACUTE LIVER DECOMPENSATION

## 2020-03-04 NOTE — PROGRESS NOTES
GENERAL SURGERY PROGRESS NOTE    CC/HPI:           Patient feels better tolerating diet. Vitals:    03/04/20 0500 03/04/20 0600 03/04/20 0800 03/04/20 0803   BP: 112/60 131/66  (!) 156/71   Pulse: 76 79  85   Resp: 19 22 22   Temp: 97.8 °F (36.6 °C)   97.7 °F (36.5 °C)   TempSrc: Oral   Oral   SpO2: 97% 94% 98% 97%   Weight:  (!) 308 lb 3.3 oz (139.8 kg)     Height:         I/O last 3 completed shifts: In: 260 [P.O.:140; I.V.:20; IV Piggyback:100]  Out: 1330 [Urine:525; Drains:805]  No intake/output data recorded. DIET LOW FAT;     Recent Results (from the past 48 hour(s))   POCT Glucose    Collection Time: 03/02/20 11:15 AM   Result Value Ref Range    POC Glucose 121 (H) 70 - 99 MG/DL   TYPE AND SCREEN    Collection Time: 03/02/20  6:14 PM   Result Value Ref Range    ABO/Rh A POSITIVE     Antibody Screen NEGATIVE    PREPARE FRESH FROZEN PLASMA, 1 Units    Collection Time: 03/02/20  7:00 PM   Result Value Ref Range    Unit Number [id number]     Component FRESH PLASMA     Unit Divison 00     Status ISSUED,FINAL     Transfusion Status OK TO TRANSFUSE    POCT Glucose    Collection Time: 03/02/20 10:30 PM   Result Value Ref Range    POC Glucose 215 (H) 70 - 99 MG/DL   Protime/INR & PTT    Collection Time: 03/02/20 11:02 PM   Result Value Ref Range    Protime 19.9 (H) 11.7 - 14.5 SECONDS    INR 1.64 INDEX    aPTT 40.7 (H) 25.1 - 37.1 SECONDS   POCT Glucose    Collection Time: 03/03/20 12:22 AM   Result Value Ref Range    POC Glucose 207 (H) 70 - 99 MG/DL   CBC Auto Differential    Collection Time: 03/03/20  3:08 AM   Result Value Ref Range    WBC 15.8 (H) 4.0 - 10.5 K/CU MM    RBC 3.73 (L) 4.2 - 5.4 M/CU MM    Hemoglobin 10.1 (L) 12.5 - 16.0 GM/DL    Hematocrit 33.8 (L) 37 - 47 %    MCV 90.6 78 - 100 FL    MCH 27.1 27 - 31 PG    MCHC 29.9 (L) 32.0 - 36.0 %    RDW 15.8 (H) 11.7 - 14.9 %    Platelets 032 (L) 327 - 440 K/CU MM    MPV 11.6 (H) 7.5 - 11.1 FL    Metamyelocytes Relative 3 (H) 0.0 % Bands Relative 9 5 - 11 %    Segs Relative 80.0 (H) 36 - 66 %    Lymphocytes % 3.0 (L) 24 - 44 %    Monocytes % 5.0 (H) 0 - 4 %    nRBC 3     Metamyelocytes Absolute 0.47 K/CU MM    Bands Absolute 1.42 K/CU MM    Segs Absolute 12.6 K/CU MM    Lymphocytes Absolute 0.5 K/CU MM    Monocytes Absolute 0.8 K/CU MM    Differential Type MANUAL DIFFERENTIAL     Polychromasia 1+     Toxic Granulation PRESENT    Comprehensive Metabolic Panel    Collection Time: 03/03/20  3:08 AM   Result Value Ref Range    Sodium 131 (L) 135 - 145 MMOL/L    Potassium 5.3 (H) 3.5 - 5.1 MMOL/L    Chloride 99 99 - 110 mMol/L    CO2 21 21 - 32 MMOL/L    BUN 51 (H) 6 - 23 MG/DL    CREATININE 1.2 (H) 0.6 - 1.1 MG/DL    Glucose 209 (H) 70 - 99 MG/DL    Calcium 7.9 (L) 8.3 - 10.6 MG/DL    Alb 2.3 (L) 3.4 - 5.0 GM/DL    Total Protein 5.6 (L) 6.4 - 8.2 GM/DL    Total Bilirubin 2.0 (H) 0.0 - 1.0 MG/DL    ALT 61 (H) 10 - 40 U/L    AST 83 (H) 15 - 37 IU/L    Alkaline Phosphatase 133 (H) 40 - 128 IU/L    GFR Non- 46 (L) >60 mL/min/1.73m2    GFR  56 (L) >60 mL/min/1.73m2    Anion Gap 11 4 - 16   Hepatic Function Panel    Collection Time: 03/03/20  3:08 AM   Result Value Ref Range    Alb 2.3 (L) 3.4 - 5.0 GM/DL    Total Bilirubin 2.0 (H) 0.0 - 1.0 MG/DL    Bilirubin, Direct 1.3 (H) 0.0 - 0.3 MG/DL    Bilirubin, Indirect 0.7 0 - 0.7 MG/DL    Alkaline Phosphatase 133 (H) 40 - 129 IU/L    AST 83 (H) 15 - 37 IU/L    ALT 61 (H) 10 - 40 U/L    Total Protein 5.6 (L) 6.4 - 8.2 GM/DL   POCT Glucose    Collection Time: 03/03/20  3:46 AM   Result Value Ref Range    POC Glucose 199 (H) 70 - 99 MG/DL   POCT Glucose    Collection Time: 03/03/20  8:52 AM   Result Value Ref Range    POC Glucose 190 (H) 70 - 99 MG/DL   POCT Glucose    Collection Time: 03/03/20 12:45 PM   Result Value Ref Range    POC Glucose 191 (H) 70 - 99 MG/DL   POCT Glucose    Collection Time: 03/03/20  5:36 PM   Result Value Ref Range    POC Glucose 164 (H) 70 - 99 MG/DL POCT Glucose    Collection Time: 03/03/20  8:45 PM   Result Value Ref Range    POC Glucose 153 (H) 70 - 99 MG/DL   POCT Glucose    Collection Time: 03/04/20 12:49 AM   Result Value Ref Range    POC Glucose 140 (H) 70 - 99 MG/DL   Comprehensive Metabolic Panel w/ Reflex to MG    Collection Time: 03/04/20  1:59 AM   Result Value Ref Range    Sodium 130 (L) 135 - 145 MMOL/L    Potassium 5.4 (H) 3.5 - 5.1 MMOL/L    Chloride 98 (L) 99 - 110 mMol/L    CO2 20 (L) 21 - 32 MMOL/L    BUN 65 (H) 6 - 23 MG/DL    CREATININE 1.7 (H) 0.6 - 1.1 MG/DL    Glucose 141 (H) 70 - 99 MG/DL    Calcium 8.1 (L) 8.3 - 10.6 MG/DL    Alb 2.3 (L) 3.4 - 5.0 GM/DL    Total Protein 5.9 (L) 6.4 - 8.2 GM/DL    Total Bilirubin 1.8 (H) 0.0 - 1.0 MG/DL    ALT 74 (H) 10 - 40 U/L     (H) 15 - 37 IU/L    Alkaline Phosphatase 144 (H) 40 - 128 IU/L    GFR Non- 31 (L) >60 mL/min/1.73m2    GFR  37 (L) >60 mL/min/1.73m2    Anion Gap 12 4 - 16   CBC    Collection Time: 03/04/20  1:59 AM   Result Value Ref Range    WBC 20.3 (H) 4.0 - 10.5 K/CU MM    RBC 3.85 (L) 4.2 - 5.4 M/CU MM    Hemoglobin 10.2 (L) 12.5 - 16.0 GM/DL    Hematocrit 35.3 (L) 37 - 47 %    MCV 91.7 78 - 100 FL    MCH 26.5 (L) 27 - 31 PG    MCHC 28.9 (L) 32.0 - 36.0 %    RDW 16.1 (H) 11.7 - 14.9 %    Platelets 851 (L) 590 - 440 K/CU MM    MPV 11.1 7.5 - 11.1 FL   POCT Glucose    Collection Time: 03/04/20  6:00 AM   Result Value Ref Range    POC Glucose 110 (H) 70 - 99 MG/DL   POCT Glucose    Collection Time: 03/04/20  7:54 AM   Result Value Ref Range    POC Glucose 104 (H) 70 - 99 MG/DL       Scheduled Meds:   [Held by provider] glimepiride  4 mg Oral Daily with breakfast    piperacillin-tazobactam  3.375 g Intravenous Q8H    sennosides-docusate sodium  2 tablet Oral BID    sodium chloride  20 mL Intravenous Once    insulin lispro  0-18 Units Subcutaneous Q4H    budesonide-formoterol  2 puff Inhalation BID    cloNIDine  0.2 mg Oral TID    exemestane 25 mg Oral Daily    ondansetron  4 mg Intravenous Once    hydrOXYzine  100 mg Oral Nightly    ipratropium-albuterol  1 vial Inhalation Q6H    isosorbide mononitrate  30 mg Oral Nightly    levothyroxine  200 mcg Oral Daily    atorvastatin  20 mg Oral Daily    zolpidem  10 mg Oral Nightly    sodium chloride flush  10 mL Intravenous 2 times per day       Continuous Infusions:   dextrose         Physical Exam:  HEENT: Anicteric sclerae, Oropharyngeal mucosae moist, pink and intact. Heart:  Normal S1 and S2, RRR  Lungs: Clear to auscultation bilaterally, No audible Wheezes or Rales. Extremities: No edema. Neuro: Alert and Oriented x 3, Non focal.  Abdomen: Soft, Benign, Non tender, Non distended, Positive bowel sounds. Incision: Nicely healing: No erythema, No discharge. DEMETRIO and cholecystostomy tubes well functioning, the DEMETRIO is clearing up,and the cholecystostomy tube is dark bilious. .       Principal Problem:    Abnormal transaminases  Active Problems:    Malignant neoplasm of left female breast (HCC)    Diabetes mellitus with skin ulcer (HCC)    Liver cirrhosis secondary to GALLEGOS (nonalcoholic steatohepatitis) (HCC)    Right-sided chest wall pain    Hepatocellular carcinoma (HCC)  Resolved Problems:    * No resolved hospital problems. *      Assessment and Plan:  Liliana Hogan is a 64 y.o. female who is POD # 2 status post:  Laparoscopic exploration, attempted cholecystectomy -  ABORTED due to the SEVERE liver cirrhosis and portal hypertension, + laparoscopic cholecystostomy tube placement + Complete hemostasis, with Fibrillar 3 x 4\" x 4\" and FloSeal - 5 ml . NEEDS liver transplant, I was told by  she is close to be on the list..    Will follow closely and transfer to [facility]. Talked with [facility] hepatologist, and they said cholecystostomy tube was not to be placed, my reason was the obstructive jaundice. Herrera Founds and the positive HIDA scan for ACUTE OBSTRUCTION OF THE GALL BLADDER:  Impression   1.  Gallbladder is not visualized by 4 hours suggesting acute cholecystitis.       2.  Delayed clearance of radiotracer from the blood pool suggesting   underlying hepatic dysfunction. NOW they are going to ask the transplant surgeon what to do. Increase Ambulation to at least 4x/day walk in the hallways with assistance. Respiratory marybeth-operative care: Incentive Spirometry / deep breathing and coughing 10x/hr while awake. Continue DVT prophylaxis with Teds and SCDs and SC Lovenox. Continue GI prophylaxis with Protonix IV till able to tolerate PO. Continue marybeth-op Abx.    ___________________________________________    Trinh Stephenson MD, FACS, FICS  3/4/2020  9:33 AM    Patient was seen with total face to face time of 25 minutes. More than 50% of this visit was counseling and education as above in my assessment and plan section of my note.

## 2020-03-04 NOTE — PROGRESS NOTES
Department of Internal Medicine  General Internal Medicine  Attending Progress Note      SUBJECTIVE:  We are still waiting for a bed for [facility]. Dr. Lacie Howell wants to know more about the two DEMETRIO drains. She is on floor status but does not have a floor bed.             OBJECTIVE      Medications    Current Facility-Administered Medications: [Held by provider] glimepiride (AMARYL) tablet 4 mg, 4 mg, Oral, Daily with breakfast  piperacillin-tazobactam (ZOSYN) 3.375 g in dextrose 5 % 50 mL IVPB extended infusion (mini-bag), 3.375 g, Intravenous, Q8H  oxyCODONE-acetaminophen (PERCOCET) 5-325 MG per tablet 2 tablet, 2 tablet, Oral, Q4H PRN  sennosides-docusate sodium (SENOKOT-S) 8.6-50 MG tablet 2 tablet, 2 tablet, Oral, BID  0.9 % sodium chloride bolus, 20 mL, Intravenous, Once  insulin lispro (HUMALOG) injection vial 0-18 Units, 0-18 Units, Subcutaneous, Q4H  budesonide-formoterol (SYMBICORT) 160-4.5 MCG/ACT inhaler 2 puff, 2 puff, Inhalation, BID  cloNIDine (CATAPRES) tablet 0.2 mg, 0.2 mg, Oral, TID  exemestane (AROMASIN) tablet 25 mg, 25 mg, Oral, Daily  gabapentin (NEURONTIN) capsule 300 mg, 300 mg, Oral, TID PRN  ondansetron (ZOFRAN) injection 4 mg, 4 mg, Intravenous, Once  hydrOXYzine (ATARAX) tablet 100 mg, 100 mg, Oral, Nightly  ipratropium-albuterol (DUONEB) nebulizer solution 3 mL, 1 vial, Inhalation, Q6H  isosorbide mononitrate (IMDUR) extended release tablet 30 mg, 30 mg, Oral, Nightly  levothyroxine (SYNTHROID) tablet 200 mcg, 200 mcg, Oral, Daily  atorvastatin (LIPITOR) tablet 20 mg, 20 mg, Oral, Daily  zolpidem (AMBIEN) tablet 10 mg, 10 mg, Oral, Nightly  sodium chloride flush 0.9 % injection 10 mL, 10 mL, Intravenous, 2 times per day  sodium chloride flush 0.9 % injection 10 mL, 10 mL, Intravenous, PRN  acetaminophen (TYLENOL) tablet 650 mg, 650 mg, Oral, Q6H PRN **OR** [DISCONTINUED] acetaminophen (TYLENOL) suppository 650 mg, 650 mg, Rectal, Q6H PRN  polyethylene glycol (GLYCOLAX) packet 17 g, 17 g, Oral, Daily PRN  promethazine (PHENERGAN) tablet 12.5 mg, 12.5 mg, Oral, Q6H PRN **OR** ondansetron (ZOFRAN) injection 4 mg, 4 mg, Intravenous, Q6H PRN  oxyCODONE (ROXICODONE) immediate release tablet 5 mg, 5 mg, Oral, Q4H PRN  HYDROmorphone (DILAUDID) injection 0.25 mg, 0.25 mg, Intravenous, Q4H PRN  glucose (GLUTOSE) 40 % oral gel 15 g, 15 g, Oral, PRN  dextrose 50 % IV solution, 12.5 g, Intravenous, PRN  glucagon (rDNA) injection 1 mg, 1 mg, Intramuscular, PRN  dextrose 5 % solution, 100 mL/hr, Intravenous, PRN    Physical    VITALS:  BP (!) 156/71   Pulse 85   Temp 97.7 °F (36.5 °C) (Oral)   Resp 22   Ht 5' 3\" (1.6 m)   Wt (!) 308 lb 3.3 oz (139.8 kg)   LMP 01/03/2012   SpO2 97%   BMI 54.60 kg/m²   Gen - a & O x 3  HEENT -  PERRLA, EOMI  CV - RRR no M/G/R, normal s1, s2  Lungs - CTA bilaterally no W/C/R  Abd - soft, NT, ND, 2 DEMETRIO drains  Ext - no cyanosis or edema    Data    CBC:   Lab Results   Component Value Date    WBC 20.3 03/04/2020    RBC 3.85 03/04/2020    RBC 3.97 08/04/2017    HGB 10.2 03/04/2020    HCT 35.3 03/04/2020    MCV 91.7 03/04/2020    MCH 26.5 03/04/2020    MCHC 28.9 03/04/2020    RDW 16.1 03/04/2020     03/04/2020    MPV 11.1 03/04/2020       ASSESSMENT AND PLAN        Liliana Hogan is a 64 y.o.  female  who presents with Abnormal transaminases. She has acute cholcysttis. She did not complete surgery due to her severe cirrhosis. She needs transfer to [facility]. [facility] would like the drains out before transfer. Need to speak to Dr. Abbi Guerrero about them.       1. Acute cholcystitis  - arrange transfer to [facility]  - Dr. Abbi Guerrero consulted      2. Recent influenza: Completed 5 days of Tamiflu. Still with cough and myalgia. Respiratory PCR -ve  3. COPD not in exacerbation: Continue breathing treatment  4. Obstructive sleep apnea on CPAP  5. Chronic respiratory failure:   6. Type 2 diabetes: Sliding scale. Hypoglycemia protocol. Hold oral hypoglycemic agents.   7. Congestive heart failure, diastolic, chronic: Diuretics were held because of acute kidney injury and hyperkalemia. 8. Acute kidney injury: Creatinine 1.2 on admission. Still 1.2.  9. Hyperkalemia: Resolved  10. Hyponatremia: Sodium 129. From Liver cirrhosis, CHF  11. History of breast cancer: Continue Aromasin  12. Hypothyroidism: Continue Synthroid   13.  Morbid obesity:  14. Liver cirrhosis due to nonalcoholic fatty liver disease        Maribel Gomez MD, MSc

## 2020-03-04 NOTE — PROGRESS NOTES
Report received at bedside, patients family at bedside, DEMETRIO drains drained at this time, patient had no needs at this time.

## 2020-03-05 NOTE — PROGRESS NOTES
Emerita STAPLETON at [facility], [phone], was notified of pt transport not arriving until 0900 on 3/5/2020.

## 2020-03-05 NOTE — PROGRESS NOTES
Received call that patient has been assigned room at [address] [place] [address] Bed A. Family and patient notified of room assignment. Access center called for transport. Will call back once transport has been setup.

## 2020-03-05 NOTE — FLOWSHEET NOTE
Received call from access center. Med Trans not available until 0900, 3/5/2020. Bed at [facility] will be held.

## 2020-03-05 NOTE — PROGRESS NOTES
GENERAL SURGERY PROGRESS NOTE    CC/HPI:           Patient feels better tolerating diet. Vitals:    03/05/20 0602 03/05/20 0805 03/05/20 0808 03/05/20 0815   BP: (!) 122/52 (!) 144/56     Pulse: 82 95     Resp: 16 26 23 23   Temp:  97.5 °F (36.4 °C)     TempSrc:  Axillary     SpO2: 95%  95% 93%   Weight:       Height:         I/O last 3 completed shifts:   In: 48 [IV Piggyback:50]  Out: 535 [Drains:535]  I/O this shift:  In: -   Out: 100 [Drains:100]    DIET GENERAL; Carb Control: 5 carb choices (75 gms)/meal; Low Sodium (2 GM)    Recent Results (from the past 48 hour(s))   POCT Glucose    Collection Time: 03/03/20 12:45 PM   Result Value Ref Range    POC Glucose 191 (H) 70 - 99 MG/DL   POCT Glucose    Collection Time: 03/03/20  5:36 PM   Result Value Ref Range    POC Glucose 164 (H) 70 - 99 MG/DL   POCT Glucose    Collection Time: 03/03/20  8:45 PM   Result Value Ref Range    POC Glucose 153 (H) 70 - 99 MG/DL   POCT Glucose    Collection Time: 03/04/20 12:49 AM   Result Value Ref Range    POC Glucose 140 (H) 70 - 99 MG/DL   Comprehensive Metabolic Panel w/ Reflex to MG    Collection Time: 03/04/20  1:59 AM   Result Value Ref Range    Sodium 130 (L) 135 - 145 MMOL/L    Potassium 5.4 (H) 3.5 - 5.1 MMOL/L    Chloride 98 (L) 99 - 110 mMol/L    CO2 20 (L) 21 - 32 MMOL/L    BUN 65 (H) 6 - 23 MG/DL    CREATININE 1.7 (H) 0.6 - 1.1 MG/DL    Glucose 141 (H) 70 - 99 MG/DL    Calcium 8.1 (L) 8.3 - 10.6 MG/DL    Alb 2.3 (L) 3.4 - 5.0 GM/DL    Total Protein 5.9 (L) 6.4 - 8.2 GM/DL    Total Bilirubin 1.8 (H) 0.0 - 1.0 MG/DL    ALT 74 (H) 10 - 40 U/L     (H) 15 - 37 IU/L    Alkaline Phosphatase 144 (H) 40 - 128 IU/L    GFR Non- 31 (L) >60 mL/min/1.73m2    GFR  37 (L) >60 mL/min/1.73m2    Anion Gap 12 4 - 16   CBC    Collection Time: 03/04/20  1:59 AM   Result Value Ref Range    WBC 20.3 (H) 4.0 - 10.5 K/CU MM    RBC 3.85 (L) 4.2 - 5.4 M/CU MM    Hemoglobin 10.2 (L) 12.5 - 16.0 MCV 87.2 78 - 100 FL    MCH 27.1 27 - 31 PG    MCHC 31.1 (L) 32.0 - 36.0 %    RDW 16.4 (H) 11.7 - 14.9 %    Platelets 176 (L) 930 - 440 K/CU MM    MPV 10.7 7.5 - 11.1 FL    Bands Relative 2 (L) 5 - 11 %    Segs Relative 90.0 (H) 36 - 66 %    Lymphocytes % 3.0 (L) 24 - 44 %    Monocytes % 5.0 (H) 0 - 4 %    Bands Absolute 0.41 K/CU MM    Segs Absolute 18.3 K/CU MM    Lymphocytes Absolute 0.6 K/CU MM    Monocytes Absolute 1.0 K/CU MM    Differential Type MANUAL DIFFERENTIAL     Polychromasia 1+     Toxic Granulation PRESENT     Giant PLTs PRESENT    Amylase    Collection Time: 03/05/20  3:44 AM   Result Value Ref Range    Amylase 41 25 - 115 U/L   Lipase    Collection Time: 03/05/20  3:44 AM   Result Value Ref Range    Lipase 63 (H) 13 - 60 IU/L   Protime/INR & PTT    Collection Time: 03/05/20  3:44 AM   Result Value Ref Range    Protime 21.9 (H) 11.7 - 14.5 SECONDS    INR 1.80 INDEX    aPTT 44.8 (H) 25.1 - 37.1 SECONDS   POCT Glucose    Collection Time: 03/05/20  4:58 AM   Result Value Ref Range    POC Glucose 108 (H) 70 - 99 MG/DL   POCT Glucose    Collection Time: 03/05/20  8:20 AM   Result Value Ref Range    POC Glucose 109 (H) 70 - 99 MG/DL       Scheduled Meds:   [Held by provider] glimepiride  4 mg Oral Daily with breakfast    piperacillin-tazobactam  3.375 g Intravenous Q8H    sennosides-docusate sodium  2 tablet Oral BID    sodium chloride  20 mL Intravenous Once    insulin lispro  0-18 Units Subcutaneous Q4H    budesonide-formoterol  2 puff Inhalation BID    cloNIDine  0.2 mg Oral TID    exemestane  25 mg Oral Daily    ondansetron  4 mg Intravenous Once    hydrOXYzine  100 mg Oral Nightly    ipratropium-albuterol  1 vial Inhalation Q6H    isosorbide mononitrate  30 mg Oral Nightly    levothyroxine  200 mcg Oral Daily    atorvastatin  20 mg Oral Daily    zolpidem  10 mg Oral Nightly    sodium chloride flush  10 mL Intravenous 2 times per day       Continuous Infusions:   dextrose Physical Exam:  HEENT: Anicteric sclerae, Oropharyngeal mucosae moist, pink and intact. Heart:  Normal S1 and S2, RRR  Lungs: Clear to auscultation bilaterally, No audible Wheezes or Rales. Extremities: No edema. Neuro: Alert and Oriented x 3, Non focal.  Abdomen: Soft, Benign, Non tender, Non distended, Positive bowel sounds. Incision: Nicely healing: No erythema, No discharge. DEMETRIO and cholecystostomy tubes well functioning, the DEMETRIO is clearing up,and the cholecystostomy tube is dark bilious. .       Principal Problem:    Abnormal transaminases  Active Problems:    Malignant neoplasm of left female breast (HCC)    Diabetes mellitus with skin ulcer (HCC)    Liver cirrhosis secondary to GALLEGOS (nonalcoholic steatohepatitis) (HCC)    Right-sided chest wall pain    Hepatocellular carcinoma (HCC)  Resolved Problems:    * No resolved hospital problems. *      Assessment and Plan:  Shazia Chisholm is a 64 y.o. female who is POD # 3 status post:  Laparoscopic exploration, attempted cholecystectomy -  ABORTED due to the SEVERE liver cirrhosis and portal hypertension, + laparoscopic cholecystostomy tube placement + Complete hemostasis, with Fibrillar 3 x 4\" x 4\" and FloSeal - 5 ml . NEEDS liver transplant, I was told by  she is close to be on the list..    Will follow closely and transfer to [facility]. [facility] accepted the transfer and going today. Talked with [facility] hepatologist, and they said cholecystostomy tube was not to be placed, my reason was the obstructive jaundice. Bula Dayhoff and the positive HIDA scan for ACUTE OBSTRUCTION OF THE GALL BLADDER:  Impression   1.  Gallbladder is not visualized by 4 hours suggesting acute cholecystitis.       2.  Delayed clearance of radiotracer from the blood pool suggesting   underlying hepatic dysfunction. ___________________________________________    Dany Bangura MD, FACS, FICS  3/5/2020  11:05 AM    Patient was seen with total face to face time of 25 minutes.  More than 50% of this visit was counseling and education as above in my assessment and plan section of my note.

## 2020-03-05 NOTE — DISCHARGE SUMMARY
Discharge Summary    Name:  Sean Gonzales /Age/Sex: 1960  (64 y.o. female)   MRN & CSN:  [id number] & [id number] Admission Date/Time: 2020  3:52 PM   Attending:  Azalia Neely MD Discharging Physician: Gerardo Madrid MD     Hospital Course:   Sean Gonzales is a 64 y.o.  female  who presents with Abnormal transaminases who has liver failure who has pre-transplant evaluation at [place]. She has had TACE in the past.  She came for fall and dizziness. She was thought to have acute cholcystitis. She was taken to the OR. She had laparoscopic surgery. Gallbladder was unable to be able to be removed due to severe cirrhosis. DEMETRIO drain was placed as well as a DEMETRIO drain in the gall bladder. She had hemostasis performed. She was no longer bleeding. We called the [address] [place] [address]. She was accepted by her hepatologist Dr. Tonja Martin. She was transferred there today. The patient expressed appropriate understanding of and agreement with the discharge recommendations, medications, and plan. Consults this admission:  IP CONSULT TO GI  IP CONSULT TO HOSPITALIST  IP CONSULT TO GENERAL SURGERY    Discharge Instruction:   Follow up appointments: none  Primary care physician:  within 2 weeks    Diet:  cardiac diet   Activity: activity as tolerated  Disposition: Discharged to:   []Home, []C, []SNF, []Acute Rehab, []Hospice * acute hospital  Condition on discharge: Stable    Discharge Medications:      Sania Mcgrath   [facility] Medication Instructions MABLE:[id number]    Printed on:20 1038   Medication Information                      budesonide-formoterol (SYMBICORT) 160-4.5 MCG/ACT AERO  Inhale 2 puffs into the lungs 2 times daily             cloNIDine (CATAPRES) 0.2 MG tablet  Take 0.2 mg by mouth 3 times daily. CPAP Machine MISC  10 cm by Does not apply route nightly.              exemestane (AROMASIN) 25 MG chemo tablet  Take 25 mg by mouth daily ferrous sulfate (SUKHI-PENNY) 325 (65 Fe) MG tablet  Take 1 tablet by mouth every other day             gabapentin (NEURONTIN) 300 MG capsule  Take 300 mg by mouth 3 times daily as needed for Pain.             glimepiride (AMARYL) 4 MG tablet  Take 4 mg by mouth 2 times daily. hydrOXYzine (ATARAX) 50 MG tablet  Take 100 mg by mouth nightly              insulin glargine (LANTUS) 100 UNIT/ML injection vial  Inject 50 Units into the skin nightly -stop when you are off the prednisone             insulin lispro (HUMALOG) 100 UNIT/ML injection vial  Inject 0-30 Units into the skin 3 times daily (with meals) Stop when you are off the prednisone             insulin lispro (HUMALOG) 100 UNIT/ML injection vial  Inject 25 Units into the skin 3 times daily (with meals) Stop when you are off the prednisone             ipratropium-albuterol (DUONEB) 0.5-2.5 (3) MG/3ML SOLN nebulizer solution  Inhale 3 mLs into the lungs every 6 hours             isosorbide mononitrate (IMDUR) 30 MG CR tablet  Take 30 mg by mouth nightly              levothyroxine (SYNTHROID) 200 MCG tablet  Take 200 mcg by mouth Daily. metFORMIN (GLUCOPHAGE) 500 MG tablet               OXYGEN  Inhale 2 L into the lungs nightly. simvastatin (ZOCOR) 20 MG tablet  Take 20 mg by mouth nightly. spironolactone (ALDACTONE) 50 MG tablet  Take 100 mg by mouth 2 times daily              torsemide (DEMADEX) 20 MG tablet  Take 1 tablet by mouth daily             zolpidem (AMBIEN) 10 MG tablet  Take by mouth nightly. .                 Objective Findings at Discharge:   BP (!) 144/56   Pulse 95   Temp 97.5 °F (36.4 °C) (Axillary)   Resp 23   Ht 5' 3\" (1.6 m)   Wt (!) 300 lb 14.9 oz (136.5 kg)   LMP 01/03/2012   SpO2 93%   BMI 53.31 kg/m²            PHYSICAL EXAM   GEN Awake female, sitting upright in bed in no apparent distress. Appears given age. EYES Pupils are equally round.   No scleral erythema, discharge, or

## 2020-03-15 PROBLEM — E11.649 HYPOGLYCEMIA ASSOCIATED WITH DIABETES (HCC): Status: ACTIVE | Noted: 2020-01-01

## 2020-03-15 NOTE — H&P
HISTORY AND PHYSICAL  (Hospitalist, Internal Medicine)  IDENTIFYING INFORMATION   PATIENT:  Damaris Gonsales  MRN:  [id number]  ADMIT DATE: 3/15/2020      CHIEF COMPLAINT   confusion    HISTORY OF PRESENT ILLNESS   Damaris Gonsales is a 64 y.o. female with morbid obesity, liver cirrhosis due to NAFLD, HCC s/p transarterial chemoembolization on 7/16/2019 and 1/17/2020, COPD on home O2, SAEID on CPAP, hypothyroidism, diabetes mellitus type 2, not on insulin, breast invasive ductal cancer status post left breast lumpectomy, sentinel lymph node biopsy and radiation-2016, chronic anemia, recent admission for acute cholecystitis-was brought to ER by squad due to altered mental status. Patient could not recall the incidents. As per patient's , patient woke him up around midnight by yelling his name. He found her to be confused, not focused, shaking all over the body. He immediately called the squad. Patient was noted to have a low blood glucose. As per ER physician, EMS glucometer read low which means less than 10. Patient was given D10 and was transferred to the hospital.  Also according to ER physician patient was found to be in decorticate position when EMS arrived. Patient was awake, alert in the ER. Lab work revealed blood glucose to be 120. As per patient's  her blood sugar was 94 at lunchtime. Patient did not check her blood sugar at dinnertime but took her medications. Denied denied patient having any fever, chills, chest pain, shortness of breath. Patient was just discharged from [facility] 3/12/2020. At presentation patient was noted to have /60, HR 93, RR 16, temperature 97.7, saturating 94% on room air. Lab work significant for sodium 127, potassium 3.1, random glucose 120, albumin 2.5, hemoglobin 8.9, platelets 153. Urine analysis was nonsuggestive of infection. Chest x-ray did not reveal any acute process.   Patient received 1 more amp of D50 in the ER as her blood glucose dropped to 65, potassium chloride 40 mEq. PAST MEDICAL HISTORY PAST SURGICAL HISTORY   morbid obesity, liver cirrhosis due to NAFLD, HCC s/p transarterial chemoembolization on 7/16/2019 and 1/17/2020, COPD on home O2, SAEID on CPAP, hypothyroidism, diabetes mellitus type 2, not on insulin, breast invasive ductal cancer status post left breast lumpectomy, sentinel lymph node biopsy and radiation-2016, chronic anemia, recent admission for acute cholecystitis Lithotripsy for left proximal ureteral calculus, left breast lumpectomy, thyroidectomy, liver biopsy   FAMILY HISTORY SOCIAL HISTORY    Hypertension, diabetes, breast cancer in mother, liver cancer in brother  quit smoking 15 years ago, denies any alcohol or illicit drug abuse. MEDICATIONS ALLERGIES    Torsemide 20 mg daily, spironolactone 100 mg twice daily, simvastatin 20 mg daily, metformin 5 mg twice daily, levothyroxine 200 MCG daily, hydroxyzine 100 mg at bedtime, glimepiride 4 mg twice daily, clonidine 0.2 mg 3 times daily, Aromasin 1 tab daily, Bydureon 2 mg q. weekly, lactulose 30 mL twice daily, aspirin 81 mg daily, sertraline 25 mg daily, alendronate 70 mg daily, DuoNeb 4 times daily, isosorbide mononitrate 30 mg daily, methocarbamol 750 mg 3 times daily, ceftriaxone 2 g every 24 hours   Lasix, sulfa antibiotics, statin, adhesive tape.        PAST MEDICAL, SURGICAL, FAMILY, and SOCIAL HISTORY         Past Medical History:   Diagnosis Date    Anxiety     Asthma     Cancer ([address]     breast(left) cancer- dx 10/2015- tx with radiation- following with Dr Nicol Alva of liver, [facility])     liver    CHF (congestive heart failure) ([address]     Chronic ulcer of right thigh with fat layer exposed ([address]     COPD (chronic obstructive pulmonary disease) ([address]     follow with Dr Odell Mtz Diabetes mellitus ([address]     dx10+ yrs ago- follows with Dr Rochelle Mi    Diabetes mellitus with skin ulcer ([address]     History of kidney stones     \"last one 3/2016- passed it- have had stones off and on for past 5 yrs follow with Dr Ana Glynn Hypertension     Liver cirrhosis [address]     for liver bx 7/10/2017    Morbid obesity ([address] 10/20/2015    PT TOO LARGE FOR MRI LEFT SHOULDER    On home oxygen therapy     oxygen at 4l/nc at hs    PONV (postoperative nausea and vomiting)     \"did get sick with the breast surgery\"    Sleep apnea     \"had sleep study several yrs ago- c-pap  does use it\"    Thyroid disease     WD-Local infection of skin and subcutaneous tissue 12/8/2017    WD-Ulcer of abdomen wall, limited to breakdown of skin ([facility]     WD-Unspecified open wound of abdominal wall, right lower quadrant without penetration into peritoneal cavity, initial encounter      Past Surgical History:   Procedure Laterality Date    ABDOMEN SURGERY N/A     BREAST SURGERY Left 2015    EXCISION MASS\"lumpectomy- took out 3 lymph nodes all ok\"    CARDIAC CATHETERIZATION  01/21/2020    no intervention    CYSTOSCOPY Left 12/23/13    stent placement    LAPAROSCOPY N/A 3/2/2020    ATTEMPTED CHOLECYSTECTOMY LAPAROSCOPIC WITH IOC ABORTED BECAUSE OF LIVER CIRRHOSIS, CHOLECYSTOSTOMY TUBE PLACED, DRAIN PLACED performed by Neel Kaplan MD at [address]  07/10/2017    LIVER SURGERY      TACE procedure in Aug 2019   Doctor [address] [address]    \"took most of the thyroid out\"    UPPER GASTROINTESTINAL ENDOSCOPY       Family History   Problem Relation Age of Onset    Cancer Mother         breast    Diabetes Mother     Heart Disease Father         MI    Diabetes Father     Other Sister         cirhosis    Heart Disease Sister     Other Brother         cirhosis    Kidney Disease Brother     Asthma Sister         COPD    Cancer Brother         liver cancer     Family Hx of HTN  Family Hx as reviewed above, otherwise non-contributory  Social History     Socioeconomic History    Marital status:      Spouse name: None    Number of children: None    Years of education: None    Highest education level: None   Occupational History    None   Social Needs    Financial resource strain: None    Food insecurity     Worry: None     Inability: None    Transportation needs     Medical: None     Non-medical: None   Tobacco Use    Smoking status: Former Smoker     Packs/day: 1.50     Years: 20.00     Pack years: 30.00     Last attempt to quit: 3/23/2005     Years since quittin.9    Smokeless tobacco: Never Used   Substance and Sexual Activity    Alcohol use: No     Alcohol/week: 0.0 standard drinks    Drug use: No    Sexual activity: Yes     Partners: Male   Lifestyle    Physical activity     Days per week: None     Minutes per session: None    Stress: None   Relationships    Social connections     Talks on phone: None     Gets together: None     Attends Synagogue service: None     Active member of club or organization: None     Attends meetings of clubs or organizations: None     Relationship status: None    Intimate partner violence     Fear of current or ex partner: None     Emotionally abused: None     Physically abused: None     Forced sexual activity: None   Other Topics Concern    None   Social History Narrative    None       MEDICATIONS   Medications Prior to Admission  Not in a hospital admission. Current Medications  No current facility-administered medications for this encounter.       Current Outpatient Medications   Medication Sig Dispense Refill    ferrous sulfate (SUKHI-PENNY) 325 (65 Fe) MG tablet Take 1 tablet by mouth every other day 60 tablet 0    insulin glargine (LANTUS) 100 UNIT/ML injection vial Inject 50 Units into the skin nightly -stop when you are off the prednisone 1 vial 3    insulin lispro (HUMALOG) 100 UNIT/ML injection vial Inject 0-30 Units into the skin 3 times daily (with meals) Stop when you are off the prednisone 1 vial 0    insulin lispro (HUMALOG) 100 UNIT/ML injection vial Inject 25 Units into the skin 3 times daily (with meals) Stop when you are off the prednisone 1 vial 3    metFORMIN (GLUCOPHAGE) 500 MG tablet   3    gabapentin (NEURONTIN) 300 MG capsule Take 300 mg by mouth 3 times daily as needed for Pain. 3    torsemide (DEMADEX) 20 MG tablet Take 1 tablet by mouth daily 90 tablet 1    spironolactone (ALDACTONE) 50 MG tablet Take 100 mg by mouth 2 times daily       ipratropium-albuterol (DUONEB) 0.5-2.5 (3) MG/3ML SOLN nebulizer solution Inhale 3 mLs into the lungs every 6 hours 120 vial 5    budesonide-formoterol (SYMBICORT) 160-4.5 MCG/ACT AERO Inhale 2 puffs into the lungs 2 times daily 1 Inhaler 5    exemestane (AROMASIN) 25 MG chemo tablet Take 25 mg by mouth daily      hydrOXYzine (ATARAX) 50 MG tablet Take 100 mg by mouth nightly   2    zolpidem (AMBIEN) 10 MG tablet Take by mouth nightly. .      CPAP Machine MISC 10 cm by Does not apply route nightly.  OXYGEN Inhale 2 L into the lungs nightly.  simvastatin (ZOCOR) 20 MG tablet Take 20 mg by mouth nightly.  levothyroxine (SYNTHROID) 200 MCG tablet Take 200 mcg by mouth Daily.  glimepiride (AMARYL) 4 MG tablet Take 4 mg by mouth 2 times daily.  isosorbide mononitrate (IMDUR) 30 MG CR tablet Take 30 mg by mouth nightly       cloNIDine (CATAPRES) 0.2 MG tablet Take 0.2 mg by mouth 3 times daily. Allergies  Allergies   Allergen Reactions    Lasix [Furosemide] Hives    Sulfa Antibiotics Rash    Nystatin Other (See Comments)     Pt reports \"burning\" sensation to skin    Tape Jeremiah Alyse Tape]        REVIEW OF SYSTEMS   Within above limitations. 14 point review of systems reviewed. Pertinent positive or negative as per HPI or otherwise negative per 14 point systems review. PHYSICAL EXAM     Wt Readings from Last 3 Encounters:   03/15/20 274 lb (124.3 kg)   03/05/20 (!) 300 lb 14.9 oz (136.5 kg)   10/26/19 (!) 320 lb (145.2 kg)       GEN  -Awake, alert, NAD, lethargic. EYES   -PERRL. HENT  -MM are dry.   RESP  -LS CTA equal (L)   Hemoglobin Quant Latest Ref Range: 12.5 - 16.0 GM/DL 8.9 (L)   Hematocrit Latest Ref Range: 37 - 47 % 29.7 (L)   MCV Latest Ref Range: 78 - 100 FL 88.7   MCH Latest Ref Range: 27 - 31 PG 26.6 (L)   MCHC Latest Ref Range: 32.0 - 36.0 % 30.0 (L)   MPV Latest Ref Range: 7.5 - 11.1 FL 11.4 (H)   RDW Latest Ref Range: 11.7 - 14.9 % 17.4 (H)   Platelet Count Latest Ref Range: 140 - 440 K/CU ... (L)   Lymphocyte % Latest Ref Range: 24 - 44 % 4.2 (L)   Monocytes % Latest Ref Range: 0 - 4 % 6.5 (H)   Eosinophils % Latest Ref Range: 0 - 3 % 0.2   Basophils % Latest Ref Range: 0 - 1 % 0.2   Lymphocytes Absolute Latest Units: K/CU MM 0.4   Monocytes Absolute Latest Units: K/CU MM 0.7   Eosinophils Absolute Latest Units: K/CU MM 0.0   Basophils Absolute Latest Units: K/CU MM 0.0   Differential Type Unknown AUTOMATED DIFFERENTIAL   Segs Relative Latest Ref Range: 36 - 66 % 88.4 (H)   Segs Absolute Latest Units: K/CU MM 9.3   Nucleated RBC % Latest Units: % 0.0   Immature Neutrophil % Latest Ref Range: 0 - 0.43 % 0.5 (H)   Total Immature Neutrophil Latest Units: K/CU MM 0.05   Total Nucleated RBC Latest Units: K/CU MM 0.0     Results for Dianne Patel (MRN [id number]) as of 3/15/2020 04:17   Ref.  Range 3/15/2020 01:59   Color, UA Latest Ref Range: YELLOW  YELLOW   Clarity, UA Latest Ref Range: CLEAR  HAZY (A)   Bilirubin, Urine Latest Ref Range: NEGATIVE MG/DL NEGATIVE   Ketones, Urine Latest Ref Range: NEGATIVE MG/DL NEGATIVE   Specific Gravity, UA Latest Ref Range: 1.001 - 1.035  1.017   Blood, Urine Latest Ref Range: NEGATIVE  NEGATIVE   Protein, UA Latest Ref Range: NEGATIVE MG/DL 30 (A)   Urobilinogen, Urine Latest Ref Range: 0.2 - 1.0 MG/DL NORMAL   Leukocyte Esterase, Urine Latest Ref Range: NEGATIVE  SMALL (A)   Glucose, Urine Latest Ref Range: NEGATIVE MG/DL NEGATIVE   Nitrite Urine, Quantitative Latest Ref Range: NEGATIVE  NEGATIVE   pH, Urine Latest Ref Range: 5.0 - 8.0  5.0   Hyaline Casts, UA Latest drain should stay indefinitely until patient has a liver transplant. She is a poor operative candidate and would not recommend removing the drain in order to prevent repeat episode of cholecystitis and need for replacement of drain (which would be done by IR and require going through her liver, which again, is high risk). For the other drain that is serosanguinous/serous, this is around her gallbladder but not in it. This drain should remain in place until it has put out <30cc/day for two days. Given her cirrhosis, she is high risk of having ascites leak from this drain site, and would keep it in until it has minimal output to decrease this risk. 5. COPD: duoneb Q6H, symbicort   6. Obstructive sleep apnea  -on CPAP at home, continue  -on oxygen at HS at home  -she sees Dr. Siria Landaverde     7. Hepatocellular carcinoma  -Had TACE procedure in [facility], and a liver transplant is being considered per patient     8. Cirrhosis: continue torsemide and spironolactone. 9. Breast cancer  -Continue Aromasin, she sees Dr. Essie Hashimoto     10. Diabetes mellitus type 2  -She is on exenatide, glimepiride, and metformin    11. Hypertension  -Resume clonidine, IMN.     12. Depression - Sertraline   13. Insomnia - hydroxyzine  14. Hypothyroidism: continue levothyroxine. 15. Chronic anemia: continue ferrous sulfate. 16. Hyperlipidemia: continue simvastatin. 16. Morbid obesity Body mass index is 56.69 kg/m².     DVT Prophylaxis: lovenox. GI Prophylaxis: not indicated. Code Status: FULL. Discussed with patient.       Case d/w ED physician    Debra Garibay MD  Hospitalist, Internal Medicine  3/15/2020 at 3:38 AM

## 2020-03-15 NOTE — ED PROVIDER NOTES
eMERGENCY dEPARTMENT eNCOUnter      CHIEF COMPLAINT:   Unresponsive episode    HPI: Damaris Gonsales is a 64 y.o. female who presents to the emergency department, via EMS, for evaluation after she was found unresponsive at home by her . No family is here on arrival to provide any history. EMS states that they were called out for a patient who is unresponsive. They found her unconscious at the home. EMS states that she had decorticated posturing while in route. They checked her glucose and the meter said \"low\" which means the glucose was less than 10. They started her on a D10 infusion and the patient became awake and alert in the back of the squad. She is awake, alert and asking what happened on arrival. She has a history of diabetes for which she takes metformin and glimepiride. She states that she took these medications today but did not eat much. She does not take any insulin. She states that she hasn't had much of an appetite and she had a recent attempted cholecystectomy with Dr. Princess Santiago. It was unsuccessful secondary to cirrhosis and spontaneous bleeding from the liver. She has surgical drains in place. She has a PICC line in place through which she has been receiving IV antibiotics. She denies fevers, chills, chest pain, shortness of breath, nausea, vomiting or any other complaints. REVIEW OF SYSTEMS:   Constitutional:  Denies fever or chills  Eyes:  Denies change in visual acuity  HENT:  Denies nasal congestion or sore throat  Respiratory:  Denies cough or shortness of breath  Cardiovascular:  Denies chest pain or edema  GI:  Denies abdominal pain, nausea, vomiting, bloody stools or diarrhea  :  Denies dysuria  Musculoskeletal:  Denies back pain or joint pain  Integument:  Denies rash  Neurologic:  Denies headache, focal weakness or sensory changes  \"Remaining review of systems reviewed and negative.  I have reviewed the nursing triage documentation and agree unless otherwise noted below.\"      PAST MEDICAL HISTORY:   Past Medical History:   Diagnosis Date    Anxiety     Asthma     Cancer ([address]     breast(left) cancer- dx 10/2015- tx with radiation- following with Dr Denis Finch of liver, [facility])     liver    CHF (congestive heart failure) ([address]     Chronic ulcer of right thigh with fat layer exposed ([address]     COPD (chronic obstructive pulmonary disease) ([address]     follow with Dr Helen Tripathi Diabetes mellitus ([address]     dx10+ yrs ago- follows with Dr Rickey Joiner    Diabetes mellitus with skin ulcer ([address]     History of kidney stones     \"last one 3/2016- passed it- have had stones off and on for past 5 yrs follow with Dr Binta Avalos Hypertension     Liver cirrhosis ([address]     for liver bx 7/10/2017    Morbid obesity ([address] 10/20/2015    PT TOO LARGE FOR MRI LEFT SHOULDER    On home oxygen therapy     oxygen at 4l/nc at hs    PONV (postoperative nausea and vomiting)     \"did get sick with the breast surgery\"    Sleep apnea     \"had sleep study several yrs ago- c-pap  does use it\"    Thyroid disease     WD-Local infection of skin and subcutaneous tissue 12/8/2017    WD-Ulcer of abdomen wall, limited to breakdown of skin ([address]     WD-Unspecified open wound of abdominal wall, right lower quadrant without penetration into peritoneal cavity, initial encounter        CURRENT MEDICATIONS:   Home medications reviewed.     SURGICAL HISTORY:   Past Surgical History:   Procedure Laterality Date    ABDOMEN SURGERY N/A     BREAST SURGERY Left 2015    EXCISION MASS\"lumpectomy- took out 3 lymph nodes all ok\"    CARDIAC CATHETERIZATION  01/21/2020    no intervention    CYSTOSCOPY Left 12/23/13    stent placement    LAPAROSCOPY N/A 3/2/2020    ATTEMPTED CHOLECYSTECTOMY LAPAROSCOPIC WITH IOC ABORTED BECAUSE OF LIVER CIRRHOSIS, CHOLECYSTOSTOMY TUBE PLACED, DRAIN PLACED performed by Gregorio Greenwood MD at [address] [place] [address]  07/10/2017    LIVER SURGERY      TACE procedure in Aug 2019    [address] most of the thyroid out\"    UPPER GASTROINTESTINAL ENDOSCOPY         FAMILY HISTORY:   Family History   Problem Relation Age of Onset   Lobo Obando Cancer Mother         breast    Diabetes Mother     Heart Disease Father         MI    Diabetes Father     Other Sister         cirhosis    Heart Disease Sister     Other Brother         cirhosis    Kidney Disease Brother     Asthma Sister         COPD    Cancer Brother         liver cancer       SOCIAL HISTORY:   Social History     Socioeconomic History    Marital status:      Spouse name: Not on file    Number of children: Not on file    Years of education: Not on file    Highest education level: Not on file   Occupational History    Not on file   Social Needs    Financial resource strain: Not on file    Food insecurity     Worry: Not on file     Inability: Not on file    Transportation needs     Medical: Not on file     Non-medical: Not on file   Tobacco Use    Smoking status: Former Smoker     Packs/day: 1.50     Years: 20.00     Pack years: 30.00     Last attempt to quit: 3/23/2005     Years since quittin.9    Smokeless tobacco: Never Used   Substance and Sexual Activity    Alcohol use: No     Alcohol/week: 0.0 standard drinks    Drug use: No    Sexual activity: Yes     Partners: Male   Lifestyle    Physical activity     Days per week: Not on file     Minutes per session: Not on file    Stress: Not on file   Relationships    Social connections     Talks on phone: Not on file     Gets together: Not on file     Attends Baptism service: Not on file     Active member of club or organization: Not on file     Attends meetings of clubs or organizations: Not on file     Relationship status: Not on file    Intimate partner violence     Fear of current or ex partner: Not on file     Emotionally abused: Not on file     Physically abused: Not on file     Forced sexual activity: Not on file   Other Topics Concern    Not on file   Social History Narrative    Not on file       ALLERGIES: Lasix [furosemide]; Sulfa antibiotics; Nystatin; and Tape [adhesive tape]    PHYSICAL EXAM:  VITAL SIGNS:   ED Triage Vitals [03/15/20 0035]   Enc Vitals Group      BP (!) 149/60      Pulse 93      Resp 16      Temp 97.7 °F (36.5 °C)      Temp Source Oral      SpO2 94 %      Weight 274 lb (124.3 kg)      Height 5' 3\" (1.6 m)      Head Circumference       Peak Flow       Pain Score       Pain Loc       Pain Edu? Excl. in [address]? Constitutional:  Non-toxic appearance  HENT: Normocephalic, Atraumatic, Bilateral external ears normal, Oropharynx moist, No oral exudates, Nose normal.  Eyes:  PERRL, EOMI, Conjunctiva normal, No discharge. Neck: Normal range of motion, No tenderness, Supple, No stridor, No lymphadenopathy. Cardiovascular:  Normal heart rate, Normal rhythm  Pulmonary/Chest:  Normal breath sounds, No respiratory distress, No wheezing  Abdomen:   Bowel sounds normal, Soft, mild generalized tenderness to palpation, surgical drains in place in the right upper abdomen  Back:  No tenderness, No CVA tenderness  Extremities:  Normal range of motion, Intact distal pulses, No edema, No tenderness  Neurologic:  Alert & oriented x 3, Normal motor function, Sensation intact to light touch throughout, No focal deficits  Skin:  Warm, Dry, No erythema, No rash      EKG Interpretation  Interpreted by me  Compared to 2/29/20  Rhythm: normal sinus  Rate: normal 79  Axis: normal  Ectopy: none  Conduction: prolonged QTC (488)  ST Segments: no acute change  T Waves: no acute change  Clinical Impression: normal sinus rhythm, prolonged QTC    Cardiac Monitor Strip Interpretation  Interpreted by me  Monitor strip interpreted for greater than 10 seconds  Rhythm: normal sinus  Rate: normal  Ectopy: none  ST Segments: normal      Radiology / Procedures:  Labs Reviewed   CBC WITH AUTO DIFFERENTIAL - Abnormal; Notable for the following components:

## 2020-03-15 NOTE — ED NOTES
Bed: 01TR-01  Expected date:   Expected time:   Means of arrival: EMS  Comments:  EMS   YUMI, James Garnett, RN  03/15/20 0372

## 2020-03-15 NOTE — DISCHARGE INSTR - COC
Continuity of Care Form    Patient Name: Pearl Harrison   :  1960  MRN:  [id number]    Admit date:  3/15/2020  Discharge date:  ***    Code Status Order: Full Code   Advance Directives:   Advance Care Flowsheet Documentation     Date/Time Healthcare Directive Type of Healthcare Directive Copy in [address] Agent's Name Healthcare Agent's Phone Number    03/15/20 0450  No, patient does not have an advance directive for healthcare treatment -- -- -- -- --          Admitting Physician:  Corinne Flavin, MD  PCP: Kiara Hendricks MD    Discharging Nurse: [facility] Unit/Room#:   Discharging Unit Phone Number: ***    Emergency Contact:   Extended Emergency Contact Information  Primary Emergency Contact: Iván Arambula  Home Phone: [phone]  Relation: Spouse    Past Surgical History:  Past Surgical History:   Procedure Laterality Date    ABDOMEN SURGERY N/A     BREAST SURGERY Left     EXCISION MASS\"lumpectomy- took out 3 lymph nodes all ok\"    CARDIAC CATHETERIZATION  2020    no intervention    CYSTOSCOPY Left 13    stent placement    LAPAROSCOPY N/A 3/2/2020    ATTEMPTED CHOLECYSTECTOMY LAPAROSCOPIC WITH IOC ABORTED BECAUSE OF LIVER CIRRHOSIS, CHOLECYSTOSTOMY TUBE PLACED, DRAIN PLACED performed by Ivanna Sal MD at [address] [place] [address]  07/10/2017    LIVER SURGERY      TACE procedure in Aug 2019   [facility] [address] [address]    \"took most of the thyroid out\"    UPPER GASTROINTESTINAL ENDOSCOPY         Immunization History:   Immunization History   Administered Date(s) Administered    Hepatitis B Adult (Engerix-B) 2018       Active Problems:  Patient Active Problem List   Diagnosis Code    Calculus of ureter N20.1    Asthma J45.909    Obstructive sleep apnea G47.33    Breast mass, left N63.20    Rotator cuff arthropathy M12.819    Malignant neoplasm of left female breast ([facility] [address] C50.912    History of breast lump/mass excision Z98.890    S/P lymph node biopsy Z98.890    Cirrhosis of liver without ascites (HCC) K74.60    Pain of left breast N64.4    S/P radiation therapy 4-12 wks ago Z92.3    Diabetes mellitus with skin ulcer (HCC) E11.622, L98.499    WD-Ulcer of abdomen wall with fat layer exposed ([address] L98.492    Mild persistent asthma without complication K56.22    WD-Unspecified open wound of abdominal wall, right lower quadrant without penetration into peritoneal cavity, initial encounter S31.103A    Retroperitoneal lymphadenopathy R59.0    WD-Local infection of skin and subcutaneous tissue L08.9    Liver cirrhosis secondary to GALLEGOS (nonalcoholic steatohepatitis) (HCC) K75.81, K74.60    CHF (congestive heart failure), NYHA class I, acute on chronic, combined (HCC) I50.43    Low hemoglobin D64.9    Right-sided chest wall pain R07.89    Shortness of breath R06.02    Abnormal transaminases R74.8    Hepatocellular carcinoma (HCC) C22.0    Hypoglycemia associated with diabetes (HCC) E11.649       Isolation/Infection:   Isolation          No Isolation        Patient Infection Status     None to display          Nurse Assessment:  Last Vital Signs: BP (!) 183/54   Pulse 97   Temp 98 °F (36.7 °C) (Oral)   Resp (!) 34   Ht 5' 3\" (1.6 m)   Wt 292 lb 12.3 oz (132.8 kg)   LMP 2012   SpO2 99%   BMI 51.86 kg/m²     Last documented pain score (0-10 scale): Pain Level: 7  Last Weight:   Wt Readings from Last 1 Encounters:   03/15/20 292 lb 12.3 oz (132.8 kg)     Mental Status:  {IP PT MENTAL STATUS:}    IV Access:  {[facility] IV ACCESS:703953883}    Nursing Mobility/ADLs:  Walking   {P DME HVNS:682437314}  Transfer  {CHP DME EBX}  Bathing  {CHP DME GVWI:839790611}  Dressing  {CHP DME DIUK:055570820}  Toileting  {P DME OQTM:956032109}  Feeding  {P DME EJTB:254281144}  Med Admin  {P DME OGHE:108807608}  Med Delivery   {[facility] MED Delivery:452998085}    Wound Care Documentation and Therapy:  Wound 04/11/17 MID ABDOMEN PANNUS ( WOUND # 6 ONSET DATE 1 WEEK)  DM NEELY 2 (Active)   Number of days: 1069       Wound (Active)   Number of days:         Elimination:  Continence:   · Bowel: {YES / UX:33307}  · Bladder: {YES / TZ:17291}  Urinary Catheter: {Urinary Catheter:149619285}   Colostomy/Ileostomy/Ileal Conduit: {YES / BQ:61130}       Date of Last BM: ***    Intake/Output Summary (Last 24 hours) at 3/15/2020 1409  Last data filed at 3/15/2020 1255  Gross per 24 hour   Intake 120 ml   Output 100 ml   Net 20 ml     No intake/output data recorded.     Safety Concerns:     [address] [employer] Safety Concerns:721586031}    Impairments/Disabilities:      [address] [employer] Impairments/Disabilities:744515513}      Patient's personal belongings (please select all that are sent with patient):  {P DME Belongings:512884856}    RN SIGNATURE:  {Esignature:321964540}    CASE MANAGEMENT/SOCIAL WORK SECTION    Inpatient Status Date: ***    Readmission Risk Assessment Score:  Readmission Risk              Risk of Unplanned Readmission:        30           Discharging to Facility/ Agency   · Name:   · Address:  · Phone:  · Fax:    Dialysis Facility (if applicable)   · Name:  · Address:  · Dialysis Schedule:  · Phone:  · Fax:    / signature: {Esignature:118296414}    PHYSICIAN SECTION    Nutrition Therapy:  Current Nutrition Therapy:   [address] [employer] Diet List:243144099}    Routes of Feeding: {CHP DME Other Feedings:616852227}  Liquids: {Slp liquid thickness:98827}  Daily Fluid Restriction: {CHP DME Yes amt example:736950383}  Last Modified Barium Swallow with Video (Video Swallowing Test): {Done Not Done ELWX:075407619}    Treatments at the Time of Hospital Discharge:   Respiratory Treatments: ***  Oxygen Therapy:  {Therapy; copd oxygen:67532}  Ventilator:    {[facility] Vent DZAX:605251903}    Rehab Therapies: {THERAPEUTIC INTERVENTION:9432711392}  Weight Bearing Status/Restrictions: {[facility] Weight Bearin}  Other Medical Equipment (for information only, NOT a DME order):  {EQUIPMENT:506688026}  Other Treatments: ***        Prognosis: {Prognosis:5454770824}    Condition at Discharge: [address] [place] [address] Patient Condition:782315529}    Rehab Potential (if transferring to Rehab): {Prognosis:1399740012}    Recommended Labs or Other Treatments After Discharge: ***    Physician Certification: I certify the above information and transfer of Dhara Shen  is necessary for the continuing treatment of the diagnosis listed and that she requires {Admit to Appropriate Level of Care:13055} for {GREATER/LESS:834883267} 30 days.      Update Admission H&P: {CHP DME Changes in KPUEW:006449751}    PHYSICIAN SIGNATURE:  {Esignature:071117501}

## 2020-03-15 NOTE — PLAN OF CARE
Problem: Pain:  Description: Pain management should include both nonpharmacologic and pharmacologic interventions.   Goal: Pain level will decrease  Description: Pain level will decrease  Outcome: Ongoing  Goal: Control of acute pain  Description: Control of acute pain  Outcome: Ongoing  Goal: Control of chronic pain  Description: Control of chronic pain  Outcome: Ongoing     Problem: Falls - Risk of:  Goal: Will remain free from falls  Description: Will remain free from falls  Outcome: Ongoing  Goal: Absence of physical injury  Description: Absence of physical injury  Outcome: Ongoing

## 2020-03-15 NOTE — PROGRESS NOTES
Patient arrived to unit via cart from ED. Pt alert and oriented. Skin assessment complete with PIETER Hylton. Pt has small open spot to bottom. Scattered bruising to BLE, stomach, BUE. Two DEMETRIO drains in place from Surgery on 3/02/2020. Pt VSS, oriented to room. Pt belongings include pants, shirt, and socks. Pt  left the ER with all other patient belongings. Call light in place. No further needs at this time.

## 2020-03-15 NOTE — CARE COORDINATION
Reviewed chart and spoke with pt about discharge needs/plans. Pt lives with her , is active with [facility] for iv atb. She has a RW at home. She has a PCP and insurance that covers medications. Discussed SNU and she is interested in short term rehab. Gave her a PPO SNU list to review. The patient's individualized treatment goals were discussed and they are in agreement with the transitional plan of care. The patient was provided with a choice in provider and understands the freedom of choice list that was provided to them. The patient and/or family verbalize understanding of treatment preferences and quality data associated with providers.    CM will need to revisit on Monday AM.

## 2020-03-15 NOTE — PROGRESS NOTES
Dr. Zoey Crowley called this RN regarding the general surgery consult. Dr. Zoey Crowley will notifiy Dr. Tony Sue of consult due to Dr. Tony Sue seeing patient prior to this admission. Electronically signed by Magdaleno Walton RN on 3/15/2020 at 4:59 PM

## 2020-03-16 NOTE — PROGRESS NOTES
Assumed care of patient from [facility]. Patient resting in bed. Denies needs or pain. Call light in reach. Assessment completed and meds given.

## 2020-03-16 NOTE — PROGRESS NOTES
Blood sugar rechecked, 51. Tube of glucose gel given. Radha Hester contacted, ordered to increase D10 to 75cc/hr. Pt remains asymptomatic. Recheck after gel 92.

## 2020-03-16 NOTE — CONSULTS
ulcer ([facility] [address] History of kidney stones, Hypertension, Liver cirrhosis ([facility] [address] Morbid obesity ([address] (10/20/2015), On home oxygen therapy, PONV (postoperative nausea and vomiting), Sleep apnea, Thyroid disease, WD-Local infection of skin and subcutaneous tissue (12/8/2017), WD-Ulcer of abdomen wall, limited to breakdown of skin ([address] and WD-Unspecified open wound of abdominal wall, right lower quadrant without penetration into peritoneal cavity, initial encounter. PSH:   has a past surgical history that includes Thyroidectomy (1990); Cystoscopy (Left, 12/23/13); Breast surgery (Left, 2015); Upper gastrointestinal endoscopy; liver biopsy (07/10/2017); Liver surgery; Cardiac catheterization (01/21/2020); Abdomen surgery (N/A); and laparoscopy (N/A, 3/2/2020). Allergies: Allergies   Allergen Reactions    Lasix [Furosemide] Hives    Sulfa Antibiotics Rash    Nystatin Other (See Comments)     Pt reports \"burning\" sensation to skin    Tape Climmie Case Tape]         Home Meds:    Prior to Admission medications    Medication Sig Start Date End Date Taking? Authorizing Provider   simvastatin (ZOCOR) 20 MG tablet Take 20 mg by mouth nightly. Yes Historical Provider, MD   cloNIDine (CATAPRES) 0.2 MG tablet Take 0.2 mg by mouth 3 times daily.    Yes Historical Provider, MD   ferrous sulfate (SUKHI-PENNY) 325 (65 Fe) MG tablet Take 1 tablet by mouth every other day 10/28/19   Bibiana Chao MD   insulin glargine (LANTUS) 100 UNIT/ML injection vial Inject 50 Units into the skin nightly -stop when you are off the prednisone 10/28/19   Bibiana Chao MD   insulin lispro (HUMALOG) 100 UNIT/ML injection vial Inject 0-30 Units into the skin 3 times daily (with meals) Stop when you are off the prednisone 10/29/19   Bibiana Chao MD   insulin lispro (HUMALOG) 100 UNIT/ML injection vial Inject 25 Units into the skin 3 times daily (with meals) Stop when you are off the prednisone 10/29/19   Bibiana Chao MD metFORMIN (GLUCOPHAGE) 500 MG tablet  9/24/19   Historical Provider, MD   gabapentin (NEURONTIN) 300 MG capsule Take 300 mg by mouth 3 times daily as needed for Pain. 9/24/19   Historical Provider, MD   torsemide (DEMADEX) 20 MG tablet Take 1 tablet by mouth daily 9/9/18   Rossana Owens MD   spironolactone (ALDACTONE) 50 MG tablet Take 100 mg by mouth 2 times daily     Historical Provider, MD   ipratropium-albuterol (DUONEB) 0.5-2.5 (3) MG/3ML SOLN nebulizer solution Inhale 3 mLs into the lungs every 6 hours 6/19/17   Nerissa Juarez MD   budesonide-formoterol [facility]) 160-4.5 MCG/ACT AERO Inhale 2 puffs into the lungs 2 times daily 2/20/17   Nerissa Juarez MD   exemestane (AROMASIN) 25 MG chemo tablet Take 25 mg by mouth daily    Historical Provider, MD   hydrOXYzine (ATARAX) 50 MG tablet Take 100 mg by mouth nightly  10/17/15   Historical Provider, MD   zolpidem (AMBIEN) 10 MG tablet Take by mouth nightly. Darrell Pulido Historical Provider, MD   CPAP Machine MISC 10 cm by Does not apply route nightly. Historical Provider, MD   OXYGEN Inhale 2 L into the lungs nightly. Historical Provider, MD   levothyroxine (SYNTHROID) 200 MCG tablet Take 200 mcg by mouth Daily. Historical Provider, MD   glimepiride (AMARYL) 4 MG tablet Take 4 mg by mouth 2 times daily.     Historical Provider, MD   isosorbide mononitrate (IMDUR) 30 MG CR tablet Take 30 mg by mouth nightly     Historical Provider, MD        [facility] Meds:    Current Facility-Administered Medications   Medication Dose Route Frequency Provider Last Rate Last Dose    fluconazole (DIFLUCAN) tablet 100 mg  100 mg Oral Daily M Brenden Adams MD        magic (miracle) mouthwash  5 mL Swish & Spit 4x Daily PRN Tonie Salas MD        glucose (GLUTOSE) 40 % oral gel 15 g  15 g Oral PRN Kristina Anderson MD   15 g at 03/16/20 1363    dextrose 50 % IV solution  12.5 g Intravenous PRN Kristina Anderson MD   12.5 g at 03/15/20 1645    glucagon (rDNA) injection 4 - 16    BUN 25 (H) 6 - 23 MG/DL    CREATININE 0.7 0.6 - 1.1 MG/DL    Glucose 33 (LL) 70 - 99 MG/DL    Calcium 8.2 (L) 8.3 - 10.6 MG/DL    GFR Non-African American >60 >60 mL/min/1.73m2    GFR African American >60 >60 mL/min/1.73m2    Phosphorus 1.4 (L) 2.5 - 4.9 MG/DL    Magnesium 1.6 (L) 1.8 - 2.4 mg/dl   POCT Glucose    Collection Time: 03/15/20  4:13 PM   Result Value Ref Range    POC Glucose 32 (L) 70 - 99 MG/DL   POCT Glucose    Collection Time: 03/15/20  4:40 PM   Result Value Ref Range    POC Glucose 58 (L) 70 - 99 MG/DL   POCT Glucose    Collection Time: 03/15/20  5:15 PM   Result Value Ref Range    POC Glucose 87 70 - 99 MG/DL   POCT Glucose    Collection Time: 03/15/20  6:22 PM   Result Value Ref Range    POC Glucose 77 70 - 99 MG/DL   POCT Glucose    Collection Time: 03/15/20  7:44 PM   Result Value Ref Range    POC Glucose 390 (H) 70 - 99 MG/DL   POCT Glucose    Collection Time: 03/15/20  7:58 PM   Result Value Ref Range    POC Glucose 189 (H) 70 - 99 MG/DL   POCT Glucose    Collection Time: 03/15/20  9:56 PM   Result Value Ref Range    POC Glucose 51 (L) 70 - 99 MG/DL   POCT Glucose    Collection Time: 03/15/20 10:33 PM   Result Value Ref Range    POC Glucose 92 70 - 99 MG/DL   POCT Glucose    Collection Time: 03/15/20 11:43 PM   Result Value Ref Range    POC Glucose 64 (L) 70 - 99 MG/DL   POCT Glucose    Collection Time: 03/16/20 12:11 AM   Result Value Ref Range    POC Glucose 78 70 - 99 MG/DL   POCT Glucose    Collection Time: 03/16/20  2:07 AM   Result Value Ref Range    POC Glucose 57 (L) 70 - 99 MG/DL   POCT Glucose    Collection Time: 03/16/20  2:34 AM   Result Value Ref Range    POC Glucose 71 70 - 99 MG/DL   CBC auto differential    Collection Time: 03/16/20  4:20 AM   Result Value Ref Range    WBC 8.9 4.0 - 10.5 K/CU MM    RBC 3.31 (L) 4.2 - 5.4 M/CU MM    Hemoglobin 8.8 (L) 12.5 - 16.0 GM/DL    Hematocrit 28.8 (L) 37 - 47 %    MCV 87.0 78 - 100 FL    MCH 26.6 (L) 27 - 31 PG    MCHC 30.6 (L) ([facility] [address]    History of breast lump/mass excision    S/P lymph node biopsy    Cirrhosis of liver without ascites (HCC)    Pain of left breast    S/P radiation therapy 4-12 wks ago    Diabetes mellitus with skin ulcer (HCC)    WD-Ulcer of abdomen wall with fat layer exposed ([facility] [address]    Mild persistent asthma without complication    WD-Unspecified open wound of abdominal wall, right lower quadrant without penetration into peritoneal cavity, initial encounter    Retroperitoneal lymphadenopathy    WD-Local infection of skin and subcutaneous tissue    Liver cirrhosis secondary to GALLEGOS (nonalcoholic steatohepatitis) ([facility] [address]    CHF (congestive heart failure), NYHA class I, acute on chronic, combined (HCC)    Low hemoglobin    Right-sided chest wall pain    Shortness of breath    Abnormal transaminases    Hepatocellular carcinoma (HCC)    Hypoglycemia associated with diabetes (HCC)       Assessment & plan:  Gómez Dooley is a very pleasant 64 y.o. female who presented yesterday with unresponsiveness, hypoglycemic, < 10!!, She is well known to me, since last month I attempted a cholecystectomy on her because she developed acute acalculous cholecystitis, with HIDA scan no visualization of the gall bladder at all, and leukocytosis, and RUQ pain, and nausea and vomiting, but the liver cirrhosis PRECLUDED from a safe lap cholecystectomy so I proceeded with cholecystostomy tube placement, and it needs to stay till her hopefully SOON transplant. .    [facility] recommended that she Needs to have less than 30 ml for 2 days before removing the abdominal drain, which is ? ? With her ascites, but will keep the drain, and the cholecystostomy tube is to ONLY be removed with her transplant. I will follow along.     Thank you doctor Andria Dias MD very much for your consultation and for the opportunity to take care of Mrs Gómez Dooley with you, I'll follow along with you and I'll update you on any new events in her

## 2020-03-16 NOTE — PROGRESS NOTES
Occupational Therapy  [facility] OCCUPATIONAL THERAPY EVALUATION    History  [place]:  The primary encounter diagnosis was Unresponsive episode. A diagnosis of Hypoglycemia was also pertinent to this visit. Restrictions:                           Communication with other providers: PT    Subjective:  Patient states:  \"I have been getting up to go to the bathroom\"  Pain:  No c/o  Patient goal:  home    Occupational profile (relevant social history and personal factors):    Social/Functional History  Lives With: Spouse  Type of Home: House  Home Layout: One level  Home Access: Stairs to enter with rails  Entrance Stairs - Number of Steps: 4  Bathroom Shower/Tub: (sponge bathes)  Bathroom Toilet: Handicap height  Home Equipment: Rolling walker, Wheelchair-manual(RW seems \"too tall\". 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 Pt reports 2 falls in the last 3 months. Examination of body systems (includes body structures/functions, activity/participation limitations):  · Orientation: WFL   · Cognition:  WFL   · Observation:  Received pt in bed. Alert and cooperative. · Vision:  [facility]   · Hearing:  WFL   · ROM:  WFL except L shldr impairments due to remote trauma/degenerative changes  · Strength: WFL BUE aside from L shldr chronic weakness   · Sensation: not tested    ADLs  Feeding: Sandoval    Grooming: Sandoval for hand wash sinkside (supervision of balance and vitals). Due to general fatigue and reduced endurance, more time consuming tasks must be completed in seated. Dressing: UB SBA in seated LB ModA (has chronic difficulties with distal LB due to impaired reaching from obesity)    Bathing: UB Cate in seated LB ModA    Toileting: SBA.  Completed functional independence. Pt will perform UB ADLs with Anant to increase functional independence. Pt will perform all aspects of toileting with Anant to increase functional independence. Pt will participate in therex/therax c emphasis on strength, activity tolerance,  safety, ANANT tasks. Plan:  Plan  Times per week: 2x      Recommendation for activity with nursing staff:  ambulate to bathroom with RW for toileting    Treatment today:    Self Care Training:   Self care training was performed today. Cues were given for safety, sequence, UE/LE placement, visual cues, and balance. Activities performed today included dressing, toileting, hand hygiene, and grooming. Education: Role of OT, OT POC, d/c needs, home safety    Safety: Left in chair with all needs in reach. Gait belt used for transfer and mobility. Time in:  0858  Time out:  0922  Timed treatment minutes:  15  Total treatment time:  24    Electronically signed by:    [id number] Darlene Valdes, OTR/L, [address] [facility]   [id number]   11:25 AM, 3/16/2020

## 2020-03-16 NOTE — PROGRESS NOTES
Assumed care for patient. Patient blood sugar 390. Perfect serve sent to Dr. Jeff Barnes. States to recheck sugar, turn off D10, and hold D20. Humalog Medium SS ordered. Upon recheck, sugar 189. Jeff fuentes served again. Jeff Barnes ordered to decrease D10 to 30cc/hr and recheck sugar in 2 hours. Cancelled insulin order.

## 2020-03-16 NOTE — PLAN OF CARE
Problem: Pain:  Goal: Pain level will decrease  Description: Pain level will decrease  3/15/2020 2130 by Makenna Mata RN  Outcome: Ongoing  3/15/2020 1805 by Keira Guerrero RN  Outcome: Ongoing  Goal: Control of acute pain  Description: Control of acute pain  3/15/2020 2130 by Makenna Mata RN  Outcome: Ongoing  3/15/2020 1805 by Keira Guerrero RN  Outcome: Ongoing  Goal: Control of chronic pain  Description: Control of chronic pain  3/15/2020 2130 by Makenna Mata RN  Outcome: Ongoing  3/15/2020 1805 by Keira Guerrero RN  Outcome: Ongoing     Problem: Falls - Risk of:  Goal: Will remain free from falls  Description: Will remain free from falls  3/15/2020 2130 by Makenna Mata RN  Outcome: Ongoing  3/15/2020 1805 by Keira Guerrero RN  Outcome: Ongoing  Goal: Absence of physical injury  Description: Absence of physical injury  3/15/2020 2130 by Makenna Mata RN  Outcome: Ongoing  3/15/2020 1805 by Keira Guerrero RN  Outcome: Ongoing

## 2020-03-16 NOTE — FLOWSHEET NOTE
Lab called and notified of need for blood cultures x2 to be drawn peripherally     Chanelle Alegria RN 10:00 AM

## 2020-03-16 NOTE — PROGRESS NOTES
HOSPITALIST    Patient with 2 RUQ drains - one apparently in the gallbladder, and another one outside of it - this 2nd one should be removed per [facility] when output less than 30 mL per day, will consult surgery to evaluate      Patient is completing a course of IV rocephin for E coli bacteremia related to gallbladder disease. She is on it for 2 weeks. Will consult ID to follow. WBC is higher today. Patient with severe, recurrent hypoglycemia. Blood sugar dropped to 33 this afternoon at 3:30 pm. On D10 drip. Patient with cirrhosis.

## 2020-03-16 NOTE — PLAN OF CARE
Problem: Pain:  Description: Pain management should include both nonpharmacologic and pharmacologic interventions.   Goal: Pain level will decrease  Description: Pain level will decrease  3/16/2020 0957 by Vitaliy Wright RN  Outcome: Ongoing  3/15/2020 2130 by Karen Delgado RN  Outcome: Ongoing  Goal: Control of acute pain  Description: Control of acute pain  3/16/2020 0957 by Vitaliy Wright RN  Outcome: Ongoing  3/15/2020 2130 by Karen Delgado RN  Outcome: Ongoing  Goal: Control of chronic pain  Description: Control of chronic pain  3/16/2020 0957 by Vitaliy Wright RN  Outcome: Ongoing  3/15/2020 2130 by Karen Delgado RN  Outcome: Ongoing     Problem: Falls - Risk of:  Goal: Will remain free from falls  Description: Will remain free from falls  3/16/2020 0957 by Vitaliy Wright RN  Outcome: Ongoing  3/15/2020 2130 by Karen Delgado RN  Outcome: Ongoing  Goal: Absence of physical injury  Description: Absence of physical injury  3/16/2020 0957 by Vitaliy Wright RN  Outcome: Ongoing  3/15/2020 2130 by Karen Delgado RN  Outcome: Ongoing

## 2020-03-16 NOTE — PROGRESS NOTES
Physical Therapy  Formerly [facility] ACUTE CARE PHYSICAL THERAPY EVALUATION  Pricilla Arambula, 1960, 2010/2010-A, 3/16/2020    History  [place]:  The primary encounter diagnosis was Unresponsive episode. A diagnosis of Hypoglycemia was also pertinent to this visit. Patient  has a past medical history of Anxiety, Asthma, Cancer ([address] Cancer of liver, primary ([address] CHF (congestive heart failure) ([address] Chronic ulcer of right thigh with fat layer exposed ([address] COPD (chronic obstructive pulmonary disease) ([address] Diabetes mellitus ([address] Diabetes mellitus with skin ulcer ([address] History of kidney stones, Hypertension, Liver cirrhosis ([address] Morbid obesity ([address] On home oxygen therapy, PONV (postoperative nausea and vomiting), Sleep apnea, Thyroid disease, WD-Local infection of skin and subcutaneous tissue, WD-Ulcer of abdomen wall, limited to breakdown of skin ([address] and WD-Unspecified open wound of abdominal wall, right lower quadrant without penetration into peritoneal cavity, initial encounter. Patient  has a past surgical history that includes Thyroidectomy (1990); Cystoscopy (Left, 12/23/13); Breast surgery (Left, 2015); Upper gastrointestinal endoscopy; liver biopsy (07/10/2017); Liver surgery; Cardiac catheterization (01/21/2020); Abdomen surgery (N/A); and laparoscopy (N/A, 3/2/2020). Subjective:  Patient states:  \"I've felt very tired. My sugar has been low\"   Pain:  Denies   Communication with other providers:  Co-eval with Lorri ASH Huddle   Restrictions: general precautions, fall risk, IV, portable tele     Home Setup/Prior level of function  Social/Functional History  Lives With: Spouse  Type of Home: House  Home Layout: One level  Home Access: Stairs to enter with rails  Entrance Stairs - Number of Steps: 4  Bathroom Shower/Tub: (sponge bathes)  Bathroom Toilet: Handicap height  Home Equipment: Rolling walker, Wheelchair-manual(RW seems \"too tall\".  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 Pt reports 2 falls in the last 3 months. One fall was OOB. Examination of body systems (includes body structures/functions, activity/participation limitations):  · Observation:  Supine in bed upon arrival. Agreeable to work with therapy   · [facility]  · Hearing:  WFL   · Cardiopulmonary:  HR up to 130bpm with activity. SpO2 stable on room air. Musculoskeletal  · ROM R/L:  WFL BLEs  · Strength R/L:  BLEs grossly 4+/5. Slightly impaired strength observed in function and endurance. · Neuro:  [facility]     Mobility/treatment:   · Rolling L/R:  NT   · Supine to sit:  Soto for trunk management with HOB elevated ~60 deg. · Scooting: SBA fwd to EOB   · Transfers:   · Sit to stand: CGA/SBA from EOB and standard toilet   · Stand to sit: CGA/SBA to standard toilet and recliner   · Step pivot: (2x) with CGA/SBA for safety with RW. VCs for sequencing full pivot turn with AD prior to initiating sit. · Sitting balance: SBA at EOB/toilet static and light dynamic without UE support. Able to perform marybeth care without additional assist.    · Standing balance: CGA progressing to SBA at RW and at sink while performing self care tasks. Reported some fatigue but no signs of buckling of major LOB. · Gait: 10ft+ 20ft with RW CGA to SBA for safety. Dec pace with reciprocal gait pattern. Reported feeling slightly SOB. · Educated pt on POC, role of PT, DME, discharge recommendations, breathing techniques. VCs for sequencing and UE placement to inc safety and indep with mobility. [facility] 6 Clicks Inpatient Mobility:  AM-PAC Inpatient Mobility Raw Score : 18    Safety: patient left in chair, call light within reach, gait belt used. Assessment: Body structures, Functions, Activity limitations: Decreased functional mobility ; Decreased safe awareness; Decreased endurance; Decreased balance; Decreased strength  Pt is a 64year old female admitted with hypoglycemia. Recommend home with 24/7 assist and [facility] PT once medically stable. At baseline, she is Katrina with gross mobility using RW and needs assistance with ADLs. She performed well this date though slightly below her typical baseline. She would benefit from continued therapy to address her current deficits, dec potential fall risk, and restore function. Complexity: Moderate  Prognosis: Good, no significant barriers to participation at this time. Plan Times per week: 3+/week  Discharge Recommendations: 24 hour supervision or assist, Home with Home health PT, S Level 1  Equipment: RW (5'3\")    Goals:  Short term goals  Time Frame for Short term goals: 1 week   Short term goal 1: Pt will transfer to bed/recliner Katrina   Short term goal 2: Pt will ambulate 50ft with RW Katrina   Short term goal 3: Pt will ascend/descend 4 steps, single rail SBA       Treatment plan:  Strengthening; Transfer Training; ROM; Functional Mobility Training; Balance Training; ADL/Self-care Training; Endurance Training; Gait Training; Neuromuscular Re-education;  Wheelchair Mobility Training; Stair training; Patient/Caregiver Education & Training; Home Exercise Program; Safety Education & Training; Equipment Evaluation, Education, & procurement; Positioning; Modalities    Recommendations for NURSING mobility: ambulate to bathroom with RW     Time:   Time in: 0859  Time out: 0922  Timed treatment minutes: 10  Total time: 23    Electronically signed by:    Vladimir Baldwin [id number]  3/16/2020, 10:25 AM

## 2020-03-16 NOTE — CONSULTS
Infectious Disease Consult Note  3/16/2020   Patient Name: Jacqui Cifuentes : 1960   Impression   Prior E. Coli Bacteremia: Secondary to Cholecystitis   · Temp max 99.5  · WBC max 10.5, now 8.9  · 3/15-UA WBC 8, RBC 3, Urine Culture 3/16-pending  · Blood Cultures 3/16-pending  · Lymphopenia and thrombocytopenia  · Imaging:  3/15-CXR shows mild interstitial opacities which may reflext mild edema vs chronic underlying lung changes  · Treated from [facility] 3/12/20) with ceftriaxone for E. Coli bacteremia to finish course on 3/21/20   DMII:  Endocrine onboard, Dr. Cain Avilez 8.3 on 10/27/2019, uncontrolled  · Morbid Obesity: BMI 56  · COPD: On home oxygen, SAEID and CPAP   · Liver Cirrhosis/Tentative Liver Transplant:  General surgery onboard, Dr. Justin Tan, 2020 attempted choley due to acute acalculous cholecystitis and leukocytosis but unable to perform due to liver cirrhosis, choley tube placement to stay in place until liver transplant-[facility] recommends she needs to have less than 30 ml for 2 days before removing abdominal drain but she has severe ascites, drain will only be removed with her transplant   Multi-morbidity: per PMHx :  Asthma, left breast cancer, liver cancer, CHF, COPD, DMII, thyroid disease, morbid obesity,   Plan:   Continue ceftriaxone and fluconazole(for oral candida placed on by [facility])   Procalcitonin and CRP today and in am for trend    Thank you for allowing me to consult in the care of this patient.  ------------------------  REASON FOR CONSULT: Infective syndrome     Requested by: Dr. Maribeth Clifton is a 64 y.o.  female who was admitted 3/15/2020 for further evaluation and management of unresponsiveness with blood glucose of <10. Patient was found unresponsive at home by her . The EMS arrived, found her to be in a decorticate posture, they check her blood glucose which read \"low\" which means less than 10.  The EMS gave her D10 IV and she then

## 2020-03-17 NOTE — CONSULTS
female breast ([address] 11/12/2015    History of breast lump/mass excision 11/12/2015    Breast mass, left 10/25/2015    Rotator cuff arthropathy 10/25/2015    Asthma 03/23/2015    Obstructive sleep apnea 03/23/2015    Calculus of ureter 12/23/2013     Past Medical History:   Diagnosis Date    Anxiety     Asthma     Cancer ([address]     breast(left) cancer- dx 10/2015- tx with radiation- following with Dr Sharita Davis [facility], [facility])     liver    CHF (congestive heart failure) ([address]     Chronic ulcer of right thigh with fat layer exposed ([address]     COPD (chronic obstructive pulmonary disease) ([address]     follow with Dr Katheryn Bingham Diabetes mellitus ([address]     dx10+ yrs ago- follows with Dr Rae Tripathi    Diabetes mellitus with skin ulcer ([address]     History of kidney stones     \"last one 3/2016- passed it- have had stones off and on for past 5 yrs follow with Dr Koko Ayala Hypertension     Liver cirrhosis ([address]     for liver bx 7/10/2017    Morbid obesity ([address] 10/20/2015    PT TOO LARGE FOR MRI LEFT SHOULDER    On home oxygen therapy     oxygen at 4l/nc at hs    PONV (postoperative nausea and vomiting)     \"did get sick with the breast surgery\"    Sleep apnea     \"had sleep study several yrs ago- c-pap  does use it\"    Thyroid disease     WD-Local infection of skin and subcutaneous tissue 12/8/2017    WD-Ulcer of abdomen wall, limited to breakdown of skin ([address]     WD-Unspecified open wound of abdominal wall, right lower quadrant without penetration into peritoneal cavity, initial encounter       Past Surgical History:   Procedure Laterality Date    ABDOMEN SURGERY N/A     BREAST SURGERY Left 2015    EXCISION MASS\"lumpectomy- took out 3 lymph nodes all ok\"    CARDIAC CATHETERIZATION  01/21/2020    no intervention    CYSTOSCOPY Left 12/23/13    stent placement    LAPAROSCOPY N/A 3/2/2020    ATTEMPTED CHOLECYSTECTOMY LAPAROSCOPIC WITH IOC ABORTED BECAUSE OF LIVER CIRRHOSIS, CHOLECYSTOSTOMY yellowish drainage  HEMT: AT/NC Oropharynx pink, moist, and without lesions or exudates; dentition in good state of repair  Eyes: PERRLA, EOMI, conjunctiva pink, sclera anicteric. Neck: Supple. Trachea midline. No LAD. Chest: no distress and CTA. Good air movement. Heart: RRR and no MRG. Abd: soft, pendulous, non-distended, no tenderness, no hepatomegaly. Normoactive bowel sounds. Bilateral ecchymosis on low abdomen. Ext: no clubbing, cyanosis, or edema  Catheter Site: without erythema or tenderness  Neuro: Mental status intact. CN 2-12 intact and no focal sensory or motor deficits    ? Diagnostic Studies: reviewed  3/15/20 XR Chest Portable:  Impression   No acute cardiopulmonary disease. 3/15/20 XR Chest Portable:  Impression   1. Right-sided PICC line tip projects over the superior vena cava with no   pneumothorax identified. 2. Mild interstitial opacities which may reflect mild edema versus chronic   underlying lung changes. ??  I have examined this patient and available medical records on this date and have made the above observations, conclusions and recommendations. Electronically signed by: Electronically signed by Yaquelin Metcalf.  SHEREE Chaves CNP on 3/17/2020 at 8:31 AM

## 2020-03-17 NOTE — PROGRESS NOTES
02/29/2020    Hepatocellular carcinoma ([address] 02/29/2020    Shortness of breath 10/28/2019    Right-sided chest wall pain 10/26/2019    CHF (congestive heart failure), NYHA class I, acute on chronic, combined ([address] 09/07/2018    Liver cirrhosis secondary to GALLEGOS (nonalcoholic steatohepatitis) ([address] 06/19/2018    WD-Local infection of skin and subcutaneous tissue 12/08/2017    Retroperitoneal lymphadenopathy 09/10/2017    WD-Unspecified open wound of abdominal wall, right lower quadrant without penetration into peritoneal cavity, initial encounter     Mild persistent asthma without complication 94/37/0089    Diabetes mellitus with skin ulcer ([address]     WD-Ulcer of abdomen wall with fat layer exposed ([address]     Pain of left breast 07/30/2016    S/P radiation therapy 4-12 wks ago 07/30/2016    Cirrhosis of liver without ascites ([address]     S/P lymph node biopsy 12/06/2015    Malignant neoplasm of left female breast ([address] 11/12/2015    History of breast lump/mass excision 11/12/2015    Breast mass, left 10/25/2015    Rotator cuff arthropathy 10/25/2015    Asthma 03/23/2015    Obstructive sleep apnea 03/23/2015    Calculus of ureter 12/23/2013     Past Medical History:   Diagnosis Date    Anxiety     Asthma     Cancer ([address]     breast(left) cancer- dx 10/2015- tx with radiation- following with Dr Michael Gilbert of liver, [facility])     liver    CHF (congestive heart failure) ([address]     Chronic ulcer of right thigh with fat layer exposed ([address]     COPD (chronic obstructive pulmonary disease) ([address]     follow with Dr Kristopher Palmer Diabetes mellitus ([address]     dx10+ yrs ago- follows with Dr Paz Marcial    Diabetes mellitus with skin ulcer ([address]     History of kidney stones     \"last one 3/2016- passed it- have had stones off and on for past 5 yrs follow with Dr Rudy Bardales    Hypertension     Liver cirrhosis ([address]     for liver bx 7/10/2017    Morbid obesity ([address] 10/20/2015    PT TOO LARGE FOR MRI LEFT SHOULDER    On home oxygen therapy     oxygen at 4l/nc at hs    PONV (postoperative nausea and vomiting)     \"did get sick with the breast surgery\"    Sleep apnea     \"had sleep study several yrs ago- c-pap  does use it\"    Thyroid disease     WD-Local infection of skin and subcutaneous tissue 2017    WD-Ulcer of abdomen wall, limited to breakdown of skin ([address]     WD-Unspecified open wound of abdominal wall, right lower quadrant without penetration into peritoneal cavity, initial encounter       Past Surgical History:   Procedure Laterality Date    ABDOMEN SURGERY N/A     BREAST SURGERY Left     EXCISION MASS\"lumpectomy- took out 3 lymph nodes all ok\"    CARDIAC CATHETERIZATION  2020    no intervention    CYSTOSCOPY Left 13    stent placement    LAPAROSCOPY N/A 3/2/2020    ATTEMPTED CHOLECYSTECTOMY LAPAROSCOPIC WITH IOC ABORTED BECAUSE OF LIVER CIRRHOSIS, CHOLECYSTOSTOMY TUBE PLACED, DRAIN PLACED performed by Elle Aguayo MD at [facility]  07/10/2017    LIVER SURGERY      TACE procedure in Aug 2019   [address]    \"took most of the thyroid out\"    UPPER GASTROINTESTINAL ENDOSCOPY        Family History   Problem Relation Age of Onset    Cancer Mother         breast    Diabetes Mother     Heart Disease Father         MI    Diabetes Father     Other Sister         cirhosis    Heart Disease Sister     Other Brother         cirhosis    Kidney Disease Brother     Asthma Sister         COPD    Cancer Brother         liver cancer      Infectious disease related family history - not contibutory. SOCIAL HISTORY  Social History     Tobacco Use    Smoking status: Former Smoker     Packs/day: 1.50     Years: 20.00     Pack years: 30.00     Last attempt to quit: 3/23/2005     Years since quittin.9    Smokeless tobacco: Never Used   Substance Use Topics    Alcohol use: No     Alcohol/week: 0.0 standard drinks        ?   ALLERGIES  Allergies Allergen Reactions    Lasix [Furosemide] Hives    Sulfa Antibiotics Rash    Nystatin Other (See Comments)     Pt reports \"burning\" sensation to skin    Tape [Adhesive Tape]       MEDICATIONS  Reviewed and are per the chart/EMR. ? Antibiotics:   Present:   Fluconazole 3/16-  Ceftriaxone 3/15-    Past:  Piperacillin-tazobactam 3/2-5?  -------------------------------------------------------------------------------------------------------------------    Vital Signs:  Vitals:    03/17/20 0809   BP:    Pulse:    Resp:    Temp:    SpO2: 98%         xam:    Gen: alert and oriented X3, no distress  Skin: no stigmata of endocarditis  Wounds: 2 DEMETRIO drains RUQ of abdomen draining yellowish drainage  HEMT: AT/NC Oropharynx pink, moist, and without lesions or exudates; dentition in good state of repair  Eyes: PERRLA, EOMI, conjunctiva pink, sclera anicteric. Neck: Supple. Trachea midline. No LAD. Chest: no distress and CTA. Good air movement. Heart: RRR and no MRG. Abd: soft, pendulous, non-distended, no tenderness, no hepatomegaly. Normoactive bowel sounds. Bilateral ecchymosis on low abdomen. Ext: no clubbing, cyanosis, or edema  Catheter Site: without erythema or tenderness  Neuro: Mental status intact. CN 2-12 intact and no focal sensory or motor deficits    ? Diagnostic Studies: reviewed  3/15/20 XR Chest Portable:  Impression   No acute cardiopulmonary disease. 3/15/20 XR Chest Portable:  Impression   1. Right-sided PICC line tip projects over the superior vena cava with no   pneumothorax identified. 2. Mild interstitial opacities which may reflect mild edema versus chronic   underlying lung changes. ??  I have examined this patient and available medical records on this date and have made the above observations, conclusions and recommendations. Electronically signed by: Electronically signed by Tye Brown.  SHEREE Chaves CNP on 3/17/2020 at 11:15 AM

## 2020-03-17 NOTE — CARE COORDINATION
Herbarie Angle requires the assistance of a wheeled walker to successfully complete daily living tasks such as: bathing, toileting, dressing and grooming. A wheeled walker is necessary due to the patient's unsteady gait, upper body weakness, inability to  an ambulation device, and can ambulate only by pushing a walker instead of a lesser assistive device such as a cane, crutch, or standard walker.

## 2020-03-17 NOTE — PROGRESS NOTES
considered per patient     8. Cirrhosis: continue torsemide and spironolactone. 9. Breast cancer  -Continue Aromasin, she sees Dr. Wu Fitzpatrick     10. Diabetes mellitus type 2  -She is on exenatide, glimepiride, and metformin     11. Hypertension  -Resume clonidine, IMN. 12. Depression - Sertraline   13. Insomnia - hydroxyzine  14. Hypothyroidism: continue levothyroxine. 15. Chronic anemia: continue ferrous sulfate. 16. Hyperlipidemia: continue simvastatin. 17. Morbid obesity Body mass index is 56.69 kg/m². History of Present Illness:     No dyspnea at rest    Objective: Intake/Output Summary (Last 24 hours) at 3/16/2020 2234  Last data filed at 3/16/2020 1823  Gross per 24 hour   Intake 1160 ml   Output 1350 ml   Net -190 ml      Vitals:   Vitals:    03/16/20 2215   BP:    Pulse:    Resp:    Temp: 99.9 °F (37.7 °C)   SpO2:      Physical Exam:     Physical Exam  Pulmonary:      Effort: Pulmonary effort is normal.   Skin:     General: Skin is dry. Neurological:      Mental Status: She is alert.              Medications:   Medications:    fluconazole  100 mg Oral Daily    insulin glargine  30 Units Subcutaneous Nightly    [START ON 3/17/2020] insulin lispro  0-18 Units Subcutaneous TID WC    [START ON 3/17/2020] insulin lispro  0-9 Units Subcutaneous 2 times per day    [START ON 3/17/2020] metFORMIN  850 mg Oral BID WC    sodium chloride flush  10 mL Intravenous 2 times per day    enoxaparin  40 mg Subcutaneous Daily    cloNIDine  0.2 mg Oral TID    exemestane  25 mg Oral Daily    ferrous sulfate  325 mg Oral Every Other Day    hydrOXYzine  100 mg Oral Nightly    isosorbide mononitrate  30 mg Oral Nightly    levothyroxine  200 mcg Oral Daily    atorvastatin  10 mg Oral Daily    spironolactone  100 mg Oral BID    torsemide  20 mg Oral Daily    cefTRIAXone (ROCEPHIN) IV  2 g Intravenous Q24H    ipratropium-albuterol  1 vial Inhalation Q6H      Infusions:    dextrose PRN Meds: magic (miracle) mouthwash, 5 mL, 4x Daily PRN  glucose, 15 g, PRN  dextrose, 12.5 g, PRN  glucagon (rDNA), 1 mg, PRN  dextrose, 100 mL/hr, PRN  sodium chloride flush, 10 mL, PRN  acetaminophen, 650 mg, Q6H PRN    Or  acetaminophen, 650 mg, Q6H PRN  polyethylene glycol, 17 g, Daily PRN  promethazine, 12.5 mg, Q6H PRN    Or  ondansetron, 4 mg, Q6H PRN  budesonide-formoterol, 2 puff, BID PRN          Electronically signed by Ashanti Tyler MD on 3/16/2020 at 10:34 PM

## 2020-03-17 NOTE — PROGRESS NOTES
Physical Therapy    Physical Therapy Treatment Note  Name: Eleuterio Valdez MRN: [id number] :   1960   Date:  3/17/2020   Admission Date: 3/15/2020 Room:  -A   Restrictions/Precautions:        general precautions, fall risk, portable tele   Communication with other providers: Jeral Gitelman. STEVE, Yared Rausch- discussed need for pt to have RW upon discharge. Subjective:  Patient states:  \"I'm supposed to go home today once this last doctor comes to see me\"   Pain:   Location, Type, Intensity (0/10 to 10/10):  cholecystostomy tub site 7/10   Objective:    Observation:    Sitting in recliner. Agreeable to work with therapy but unwilling to go out of the room due to the \"virus\" going around. Adequate time for recovery between trials. HR up to 120bpm with activity with inc RR to high 30s. Treatment, including education/measures:  Sit to stand: 3x from recliner SBA for safety  Stand to sit: to recliner SBA for safety. VCs for UE sequencing when using RW to safely use arm rests for support when descending to chair  Step pivot: 2x without AD SBA, 1x with RW SBA with VCs for sequencing full pivot turn with AD prior to sitting  Ambulation: 30ft x 2 without AD SBA + 10ft using RW SBA. Improved stability with use of RW and pt reported that ambulation felt \"easier\". Excessive lateral trunk flexion and reaching for external support when ambulating without AD. All trials pt demonstrated slow pace with some generalized fatigue to LEs. Standing dynamic activity: x 3 minutes standing at sink counter while brushing her teeth SBA   Educated pt on POC, role of PT, DME, discharge recommendations.    Assessment / Impression:    Patient's tolerance of treatment:  Good   Adverse Reaction: no   Significant change in status and impact:  no  Barriers to improvement:  Activity tolerance, strength, balance, pain   Plan for Next Session:    Activity tolerance, strength, balance, gait, transfers, stairs   Time in:  1338  Time out: 1401  Timed treatment minutes:  23  Total treatment time:  23    Previously filed items:  Social/Functional History  Lives With: Spouse  Type of Home: House  Home Layout: One level  Home Access: Stairs to enter with rails  Entrance Stairs - Number of Steps: 4  Bathroom Shower/Tub: (sponge bathes)  Bathroom Toilet: Handicap height  Home Equipment: Rolling walker, Wheelchair-manual(RW seems \"too tall\". 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 Pt reports 2 falls in the last 3 months.    Short term goals  Time Frame for Short term goals: 1 week   Short term goal 1: Pt will transfer to bed/recliner Katrina   Short term goal 2: Pt will ambulate 50ft with RW Katrina   Short term goal 3: Pt will ascend/descend 4 steps, single rail SBA       Electronically signed by:    Jalen Harrington [id number]  3/17/2020, 3:35 PM

## 2020-03-17 NOTE — PROGRESS NOTES
GENERAL SURGERY PROGRESS NOTE    CC/HPI:           Patient feels better and tolerating diet. Vitals:    03/17/20 0809 03/17/20 1128 03/17/20 1431 03/17/20 1554   BP:  (!) 128/48 (!) 132/55 (!) 134/50   Pulse:  85 91 89   Resp:  22 25 (!) 32   Temp:  98.4 °F (36.9 °C)  98.5 °F (36.9 °C)   TempSrc:  Oral  Oral   SpO2: 98% 99%  99%   Weight:       Height:         I/O last 3 completed shifts: In: 5948 [P.O.:480; I.V.:910; IV Piggyback:150]  Out: 50 [Drains:50]  No intake/output data recorded. DIET CARB CONTROL;     Recent Results (from the past 48 hour(s))   POCT Glucose    Collection Time: 03/15/20  4:13 PM   Result Value Ref Range    POC Glucose 32 (L) 70 - 99 MG/DL   POCT Glucose    Collection Time: 03/15/20  4:40 PM   Result Value Ref Range    POC Glucose 58 (L) 70 - 99 MG/DL   POCT Glucose    Collection Time: 03/15/20  5:15 PM   Result Value Ref Range    POC Glucose 87 70 - 99 MG/DL   POCT Glucose    Collection Time: 03/15/20  6:22 PM   Result Value Ref Range    POC Glucose 77 70 - 99 MG/DL   POCT Glucose    Collection Time: 03/15/20  7:44 PM   Result Value Ref Range    POC Glucose 390 (H) 70 - 99 MG/DL   POCT Glucose    Collection Time: 03/15/20  7:58 PM   Result Value Ref Range    POC Glucose 189 (H) 70 - 99 MG/DL   POCT Glucose    Collection Time: 03/15/20  9:56 PM   Result Value Ref Range    POC Glucose 51 (L) 70 - 99 MG/DL   POCT Glucose    Collection Time: 03/15/20 10:33 PM   Result Value Ref Range    POC Glucose 92 70 - 99 MG/DL   POCT Glucose    Collection Time: 03/15/20 11:43 PM   Result Value Ref Range    POC Glucose 64 (L) 70 - 99 MG/DL   POCT Glucose    Collection Time: 03/16/20 12:11 AM   Result Value Ref Range    POC Glucose 78 70 - 99 MG/DL   POCT Glucose    Collection Time: 03/16/20  2:07 AM   Result Value Ref Range    POC Glucose 57 (L) 70 - 99 MG/DL   POCT Glucose    Collection Time: 03/16/20  2:34 AM   Result Value Ref Range    POC Glucose 71 70 - 99 MG/DL   CBC auto differential    Collection Time: 03/16/20  4:20 AM   Result Value Ref Range    WBC 8.9 4.0 - 10.5 K/CU MM    RBC 3.31 (L) 4.2 - 5.4 M/CU MM    Hemoglobin 8.8 (L) 12.5 - 16.0 GM/DL    Hematocrit 28.8 (L) 37 - 47 %    MCV 87.0 78 - 100 FL    MCH 26.6 (L) 27 - 31 PG    MCHC 30.6 (L) 32.0 - 36.0 %    RDW 17.4 (H) 11.7 - 14.9 %    Platelets 61 (L) 735 - 440 K/CU MM    MPV 12.7 (H) 7.5 - 11.1 FL    Differential Type AUTOMATED DIFFERENTIAL     Segs Relative 78.2 (H) 36 - 66 %    Lymphocytes % 11.0 (L) 24 - 44 %    Monocytes % 9.1 (H) 0 - 4 %    Eosinophils % 0.9 0 - 3 %    Basophils % 0.2 0 - 1 %    Segs Absolute 7.0 K/CU MM    Lymphocytes Absolute 1.0 K/CU MM    Monocytes Absolute 0.8 K/CU MM    Eosinophils Absolute 0.1 K/CU MM    Basophils Absolute 0.0 K/CU MM    Nucleated RBC % 0.0 %    Total Nucleated RBC 0.0 K/CU MM    Total Immature Neutrophil 0.05 K/CU MM    Immature Neutrophil % 0.6 (H) 0 - 0.43 %   Critical Care Panel    Collection Time: 03/16/20  4:20 AM   Result Value Ref Range    Sodium 134 (L) 135 - 145 MMOL/L    Potassium 3.9 3.5 - 5.1 MMOL/L    Chloride 99 99 - 110 mMol/L    CO2 25 21 - 32 MMOL/L    Anion Gap 10 4 - 16    BUN 26 (H) 6 - 23 MG/DL    CREATININE 0.8 0.6 - 1.1 MG/DL    Glucose 44 (LL) 70 - 99 MG/DL    Calcium 8.1 (L) 8.3 - 10.6 MG/DL    GFR Non-African American >60 >60 mL/min/1.73m2    GFR African American >60 >60 mL/min/1.73m2    Phosphorus 2.5 2.5 - 4.9 MG/DL    Magnesium 2.1 1.8 - 2.4 mg/dl   C-Reactive Protein    Collection Time: 03/16/20  4:20 AM   Result Value Ref Range    CRP, High Sensitivity 88.8 mg/L   Procalcitonin    Collection Time: 03/16/20  4:20 AM   Result Value Ref Range    Procalcitonin 0.526    POCT Glucose    Collection Time: 03/16/20  4:36 AM   Result Value Ref Range    POC Glucose 50 (L) 70 - 99 MG/DL   POCT Glucose    Collection Time: 03/16/20  5:06 AM   Result Value Ref Range    POC Glucose 54 (L) 70 - 99 MG/DL   POCT Glucose    Collection Time: 03/16/20  6:12 AM   Result Absolute 0.7 K/CU MM    Eosinophils Absolute 0.2 K/CU MM    Basophils Absolute 0.0 K/CU MM    Nucleated RBC % 0.0 %    Total Nucleated RBC 0.0 K/CU MM    Total Immature Neutrophil 0.06 K/CU MM    Immature Neutrophil % 0.7 (H) 0 - 0.43 %       Scheduled Meds:   fluconazole  100 mg Oral Daily    insulin glargine  30 Units Subcutaneous Nightly    insulin lispro  0-18 Units Subcutaneous TID WC    insulin lispro  0-9 Units Subcutaneous 2 times per day    metFORMIN  850 mg Oral BID WC    sodium chloride flush  10 mL Intravenous 2 times per day    enoxaparin  40 mg Subcutaneous Daily    cloNIDine  0.2 mg Oral TID    exemestane  25 mg Oral Daily    ferrous sulfate  325 mg Oral Every Other Day    hydrOXYzine  100 mg Oral Nightly    isosorbide mononitrate  30 mg Oral Nightly    levothyroxine  200 mcg Oral Daily    atorvastatin  10 mg Oral Daily    spironolactone  100 mg Oral BID    torsemide  20 mg Oral Daily    cefTRIAXone (ROCEPHIN) IV  2 g Intravenous Q24H    ipratropium-albuterol  1 vial Inhalation Q6H       Continuous Infusions:   dextrose         Physical Exam:  HEENT: Anicteric sclerae, Oropharyngeal mucosae moist, pink and intact. Heart:  Normal S1 and S2, RRR  Lungs: Clear to auscultation bilaterally, No audible Wheezes or Rales. Extremities: No edema. Neuro: Alert and Oriented x 3, Non focal.  Abdomen: Non tender. Non distended. Positive bowel sounds. No hernias noted, no masses palpable. Both drains are nicely functioning. Incision: Nicely healing: No erythema, No discharge. Active Problems:    Hypoglycemia associated with diabetes ([address]    Unresponsive episode    E coli bacteremia  Resolved Problems:    * No resolved hospital problems.  *      Assessment and Plan:    Dhara Shen is a very pleasant 64 y.o. female who presented the day before yesterday with unresponsiveness, hypoglycemic, < 10!!, She is well known to me, since last month I attempted a cholecystectomy on her because she developed acute acalculous cholecystitis, with HIDA scan no visualization of the gall bladder at all, and leukocytosis, and RUQ pain, and nausea and vomiting, but the liver cirrhosis PRECLUDED from a safe lap cholecystectomy so I proceeded with cholecystostomy tube placement, and it needs to stay till her hopefully SOON transplant. .    [facility] recommended that she Needs to have less than 30 ml /day for 2 days before removing the abdominal drain, which is ? ? With her ascites, but will keep the drain, and the cholecystostomy tube is to ONLY be removed with her transplant. May be discharged from my standpoint, and will follow her as OP.    ___________________________________________    Muna Perez MD, FACS, FICS  3/17/2020  4:03 PM    Patient was seen with total face to face time of 25 minutes. More than 50% of this visit was counseling and education as above in my assessment and plan section of my note.

## 2020-03-17 NOTE — PROGRESS NOTES
Progress Note( Dr. Chasity Webster)  3/16/2020  Subjective:   Admit Date: 3/15/2020  PCP: Huong Smalls MD    Admitted For :Hypoglycemia due to over hypo-glycemic drugs    Consulted For: Better control of blood glucose    Interval History: Patient continues to have episodes of hypoglycemia even with 10% dextrose so we changed to 20% dextrose last night that will stabilize her blood glucose    Denies any chest pains,   Has  SOB . On breathing treatment  Denies nausea or vomiting. Able to eat well with no acute GI problems just still has gallbladder draining tube  No new bowel or bladder symptoms. Complains of severe sore in the mouth possibly overall thrush      Intake/Output Summary (Last 24 hours) at 3/16/2020 2123  Last data filed at 3/16/2020 1823  Gross per 24 hour   Intake 1160 ml   Output 1350 ml   Net -190 ml       DATA    CBC:   Recent Labs     03/15/20  0057 03/15/20  1532 03/16/20  0420   WBC 10.5 13.7* 8.9   HGB 8.9* 9.6* 8.8*     RESULTS CONFIRMED BY SMEAR REVIEW  * 92* 61*    CMP:  Recent Labs     03/15/20  0057 03/15/20  1532 03/16/20  0420   * 129* 134*   K 3.1* 3.8 3.9   CL 96* 95* 99   CO2 20* 23 25   BUN 32* 25* 26*   CREATININE 0.7 0.7 0.8   CALCIUM 8.1* 8.2* 8.1*   PROT 7.1  --   --    LABALBU 2.5*  --   --    BILITOT 1.5*  --   --    ALKPHOS 133*  --   --    AST 61*  --   --    ALT 36  --   --      Lipids:   Lab Results   Component Value Date    CHOL 123 10/27/2019    HDL 54 10/27/2019    TRIG 69 10/27/2019     Glucose:  Recent Labs     03/16/20  1427 03/16/20  1625 03/16/20  1832   POCGLU 246* 259* 323*     YuxjawdtotL2X:  Lab Results   Component Value Date    LABA1C 8.3 10/27/2019     High Sensitivity TSH:   Lab Results   Component Value Date    TSHHS 2.150 09/15/2018     Free T3: No results found for: FT3  Free T4:  Lab Results   Component Value Date    T4FREE 1.29 09/15/2018     .      Xr Chest Portable    Result Date: 3/15/2020  EXAMINATION: ONE XRAY VIEW OF THE CHEST 3/15/2020

## 2020-03-19 NOTE — DISCHARGE SUMMARY
Discharge Summary    Name:  Dane Llamas /Age/Sex: 1960  (64 y.o. female)   MRN & CSN:  [id number] & [id number] Admission Date/Time: 3/15/2020 12:38 AM   Attending:  No att. providers found Discharging Physician: Rafaela Dukes MD     HPI:     Per H&P:  Dane Llamas is a 64 y.o. female with morbid obesity, liver cirrhosis due to NAFLD, [address] s/p transarterial chemoembolization on 2019 and 2020, COPD on home O2, SAEID on CPAP, hypothyroidism, diabetes mellitus type 2, not on insulin, breast invasive ductal cancer status post left breast lumpectomy, sentinel lymph node biopsy and radiation-2016, chronic anemia, recent admission for acute cholecystitis-was brought to ER by squad due to altered mental status. Patient could not recall the incidents. As per patient's , patient woke him up around midnight by yelling his name. He found her to be confused, not focused, shaking all over the body. He immediately called the squad. Patient was noted to have a low blood glucose. As per ER physician, EMS glucometer read low which means less than 10. Patient was given D10 and was transferred to the hospital.  Also according to ER physician patient was found to be in decorticate position when EMS arrived. Patient was awake, alert in the ER. Lab work revealed blood glucose to be 120. As per patient's  her blood sugar was 94 at lunchtime. Patient did not check her blood sugar at dinnertime but took her medications. Denied denied patient having any fever, chills, chest pain, shortness of breath. Patient was just discharged from [address] [place] [address] 3/12/2020. At presentation patient was noted to have /60, HR 93, RR 16, temperature 97.7, saturating 94% on room air. Lab work significant for sodium 127, potassium 3.1, random glucose 120, albumin 2.5, hemoglobin 8.9, platelets 581. Urine analysis was nonsuggestive of infection. Chest x-ray did not reveal any acute process.   Patient received 1 more amp of D50 in the ER as her blood glucose dropped to 65, potassium chloride 40 mEq. Hospital Course:     Maco Walter is a pleasant 61-year-old who presented to ED via EMS from home. Per EMS she was unconscious when they arrived. They stated her blood sugar was only 10 at home. She was given glucagon by the EMS. On arrival to ED she was conscious and her blood sugar was 120 and she was admitted. She was recently here in this hospital for acute cholecystitis. An attempted cholecystectomy was aborted due to severe liver cirrhosis and portal hypertension with some bleeding. She had a laparoscopic cholecystostomy tube placed on March 2. She was transferred to [facility] on March 5. [facility] managed her conservatively. She was dismissed from [facility] on March 12 and then readmitted here for this admit 3 days later. .    It became apparent that she was not eating well at home, and stated that she was still taking all her hypoglycemic meds  which is the most likely reason she had hypoglycemia. Her regimen was adjusted while here, and she feels much better today and really wants to go home. She was discharged in stable condition. She has been taken off glimepiride at this time - as this medication is known to cause prolonged hypoglycemia. Problem list    Hypoglycemia  -was severe, and persistent  -treated with D10 drip but remained hypoglycemic, then treated with D20 drip with success  -advised to hold her hypoglycemic medications at home when not eating well.   -stable for dc now    Diabetes mellitus type 2 for 20 years - last A1C was 8.3% in October 2019  -Exenatide or Bydureon has been held for now (she takes it once a week on Sunday)  -Glimepiride has been discontinued for now  -Metformin is to be resumed  -Insulin glargine and Humalog were listed in Epic - but she is really not on them, they were prescribed last October at [facility] from hospitalization for COPD Exac - to cover her steroid induced hypoglycemia for nystatin (MYCOSTATIN) 187132 UNIT/ML suspension  Take 5 mLs by mouth 4 times daily             OXYGEN  Inhale 2 L into the lungs nightly. simvastatin (ZOCOR) 20 MG tablet  Take 20 mg by mouth nightly. spironolactone (ALDACTONE) 50 MG tablet  Take 100 mg by mouth 2 times daily              torsemide (DEMADEX) 20 MG tablet  Take 1 tablet by mouth daily             zolpidem (AMBIEN) 10 MG tablet  Take by mouth nightly. .                 Objective Findings at Discharge:   BP (!) 134/50   Pulse 89   Temp 98.5 °F (36.9 °C) (Oral)   Resp (!) 32   Ht 5' 3\" (1.6 m)   Wt 298 lb 9.6 oz (135.4 kg)   LMP 01/03/2012   SpO2 99%   BMI 52.89 kg/m²            PHYSICAL EXAM   GEN Awake female, sitting upright in bed in no apparent distress. Appears given age. LABS:    CBC:   Lab Results   Component Value Date    WBC 8.9 03/17/2020    HGB 8.7 03/17/2020    HCT 28.6 03/17/2020    MCV 87.7 03/17/2020    PLT 63 03/17/2020     BMP:   Lab Results   Component Value Date     03/17/2020    K 4.2 03/17/2020    CL 95 03/17/2020    CO2 24 03/17/2020    PHOS 2.6 03/17/2020    BUN 27 03/17/2020    CREATININE 0.9 03/17/2020    CALCIUM 8.0 03/17/2020     IMAGING:     XR CHEST PORTABLE [[id number]] Collected: 03/15/20 1830     Order Status: Completed Updated: 03/15/20 1838     Narrative:       EXAMINATION:  ONE XRAY VIEW OF THE CHEST    3/15/2020 5:35 pm    COMPARISON:  Chest x-ray March 15, 2020    HISTORY:  ORDERING SYSTEM PROVIDED HISTORY: Verify PICC placement  TECHNOLOGIST PROVIDED HISTORY:  Reason for exam:->Verify PICC placement  Reason for Exam: picc line insertion  Acuity: Acute  Type of Exam: Subsequent/Follow-up    FINDINGS:  Right-sided PICC line present tip projecting over the superior vena cava. No  pneumothorax identified.   There is mild central pulmonary vascular congestion  and mild interstitial opacities throughout the lungs which may reflect  chronic underlying lung changes versus mild

## 2020-04-02 PROBLEM — A41.9 SEPSIS (HCC): Status: ACTIVE | Noted: 2020-01-01

## 2020-04-02 NOTE — H&P
History and Physical      Name:  Lauryn France /Age/Sex: 1960  (64 y.o. female)   MRN & CSN:  [id number] & [id number] Admission Date/Time: 2020 12:44 PM   Location:  ED02/ED-02 PCP: ePnelope Sevilla MD       Hospital Day: 1    Assessment and Plan:   Lauryn France is a 64 y.o.  female  who presents with cough, dyspnea, feer, body aches.     > Sepsis  > Dyspnea, cough, reported fever immunocompromised , Covid pending. > recent H/o E coli bacteremia, completed Abx course  > h/o cholecystitis s/p attempted cholecystectomy with 2 drains in place  > Decubitus ulcer.   - resp panel neg, lactate 2.6, blood cx pending, UA pending, CXR pulm congestion no consolidation, CT abd with known hepatocellular carcinoma, adenoma,   - admit, IV Vanc, zosyn, Covid, consult pulm, ID, wound care,  hold BP meds. IVF    >  Hyponatremia. -IVF, consult nephro, repeat levels.      > Hypothyroidism:   - Low TSH, consult endo , continue levothyroxine.     > Diabetes mellitus type 2   -Hold home meds, cover with lantus, SSI, consult endo       > H/o COPD   > Obstructive sleep apnea  -on CPAP at home,   -on oxygen at HS at home  - albuterol , symbicort  - consult pulm     >Hypertension  -hold meds due to concern about sepsis.      > GALLEGOS cirrhosis complicated by [address] and portal HTN  -Hold home diuretics due to concern about sepsis,   -Continue on home lactulose     >  Recent admission for acute cholecystitis. Per prior documentation   \"Surgery was consulted at [facility] for management of the drains. Per [facility], plan is to remove drain near gallbladder draining serosanguinous fluid when output is less than 30 cc for 2 days. Plan to keep cholecystotomy drain with bilious output indefinitely given high risk for recurrent cholecystitis and she is a poor operative candidate.  She has follow up arranged with surgery at [facility] for further management of the drains.   -Surgery was consulted also here in [place] - and recommended keeping both drains in for now. Patient has follow up at [facility] . \"     >History breast cancer 5 years ago  -She had a lumpectomy then 40 rounds of radiation and is now on hormonal therapy.  She denies having had chemotherapy. Continue Noemy, she sees Dr. Abbie Flores.    > Hepatocellular carcinoma  -Had TACE procedure in [place], and a liver transplant is being considered per patient  Hepatocellular carcinoma s/p TACE n 7/19 and 1/2020 - undergoing transplant evaluation, has had multiple visits to [facility] recently      > Morbid obesity Body mass index is > 50: life style modification recommended  > Depression - Sertraline   > Insomnia - hydroxyzine  > Chronic anemia:    > Hyperlipidemia: continue simvastatin. Diet No diet orders on file   DVT Prophylaxis [] Lovenox   Code Status Prior     History of Present Illness:     Chief Complaint: cough, dyspnea, feer, body aches. Savita Real is a 64 y.o.  female  who presents with cough, dyspnea, feer, body aches. Pt presented with few days h/o worsening dyspnea, worse with exertion better with rest, associated with cough, fever, generalized body aches,  Admit chest pain with cough, admit weakness, stable abd pain, no n/V.     10-14 point ROS reviewed negative, unless as noted above     Objective:   No intake or output data in the 24 hours ending 04/02/20 1613   Vitals:   Vitals:    04/02/20 1532   BP:    Pulse: 111   Resp:    Temp:    SpO2: 100%     Physical Exam:    GEN Awake female, sitting upright in bed. Appears given age. Morbid obesity, can not complete a sentence due to dyspnea. EYES Pupils are equally round. No scleral erythema, discharge, or conjunctivitis. HENT Mucous membranes are moist.   NECK No apparent thyromegaly or masses. RESP Decreased air sounds,  can not complete a sentence due to dyspnea. CARDIO/VASC S1/S2 auscultated. tachy . peripheral edema. GI Abdomen is soft , generalized non specific discomfort with palp, no guarding.  Bowel sounds are normoactive. Rectal exam deferred. Drain in place   Ang[place] catheter is not present. MSK No gross joint deformities. Spontaneous movement of all extremities  SKIN Normal coloration, warm, dry. NEURO Cranial nerves appear grossly intact, normal speech, no lateralizing weakness. PSYCH Awake, alert, oriented x 4. Affect appropriate. Past Medical History:      Past Medical History:   Diagnosis Date    Anxiety     Asthma     Cancer ([address]     breast(left) cancer- dx 10/2015- tx with radiation- following with Dr Karen Echols of liver, [facility])     liver    CHF (congestive heart failure) ([address]     Chronic ulcer of right thigh with fat layer exposed ([address]     COPD (chronic obstructive pulmonary disease) ([address]     follow with Dr Jolinda Severs Diabetes mellitus ([address]     dx10+ yrs ago- follows with Dr Amor Forbes    Diabetes mellitus with skin ulcer ([address]     History of kidney stones     \"last one 3/2016- passed it- have had stones off and on for past 5 yrs follow with Dr Linda Reed Hypertension     Liver cirrhosis ([address]     for liver bx 7/10/2017    Morbid obesity ([address] 10/20/2015    PT TOO LARGE FOR MRI LEFT SHOULDER    On home oxygen therapy     oxygen at 4l/nc at hs    PONV (postoperative nausea and vomiting)     \"did get sick with the breast surgery\"    Sleep apnea     \"had sleep study several yrs ago- c-pap  does use it\"    Thyroid disease     WD-Local infection of skin and subcutaneous tissue 12/8/2017    WD-Ulcer of abdomen wall, limited to breakdown of skin ([address]     WD-Unspecified open wound of abdominal wall, right lower quadrant without penetration into peritoneal cavity, initial encounter      PSHX:  has a past surgical history that includes Thyroidectomy (1990); Cystoscopy (Left, 12/23/13); Breast surgery (Left, 2015); Upper gastrointestinal endoscopy; liver biopsy (07/10/2017); Liver surgery; Cardiac catheterization (01/21/2020);  Abdomen surgery (N/A); and laparoscopy (N/A, (FOSAMAX) 70 MG tablet  1/3/20   Historical Provider, MD   lactulose ([address]) 10 GM/15ML solution  1/18/20   Historical Provider, MD   nystatin (MYCOSTATIN) 411624 UNIT/ML suspension Take 5 mLs by mouth 4 times daily 3/17/20   Jenny Sahni MD   ferrous sulfate (SUKHI-PENNY) 325 (65 Fe) MG tablet Take 1 tablet by mouth every other day 10/28/19   Jenny Sahni MD   metFORMIN (GLUCOPHAGE) 500 MG tablet  9/24/19   Historical Provider, MD   gabapentin (NEURONTIN) 300 MG capsule Take 300 mg by mouth 3 times daily as needed for Pain. 9/24/19   Historical Provider, MD   torsemide (DEMADEX) 20 MG tablet Take 1 tablet by mouth daily 9/9/18   Von Degroot MD   spironolactone (ALDACTONE) 50 MG tablet Take 100 mg by mouth 2 times daily     Historical Provider, MD   ipratropium-albuterol (DUONEB) 0.5-2.5 (3) MG/3ML SOLN nebulizer solution Inhale 3 mLs into the lungs every 6 hours 6/19/17   Navarro Quinn MD   budesonide-formoterol [facility]) 160-4.5 MCG/ACT AERO Inhale 2 puffs into the lungs 2 times daily 2/20/17   Navarro Quinn MD   exemestane (AROMASIN) 25 MG chemo tablet Take 25 mg by mouth daily    Historical Provider, MD   hydrOXYzine (ATARAX) 50 MG tablet Take 100 mg by mouth nightly  10/17/15   Historical Provider, MD   zolpidem (AMBIEN) 10 MG tablet Take by mouth nightly. Daily Rene Historical Provider, MD   CPAP Machine MISC 10 cm by Does not apply route nightly. Historical Provider, MD   OXYGEN Inhale 2 L into the lungs nightly. Historical Provider, MD   simvastatin (ZOCOR) 20 MG tablet Take 20 mg by mouth nightly. Historical Provider, MD   levothyroxine (SYNTHROID) 200 MCG tablet Take 200 mcg by mouth Daily. Historical Provider, MD   isosorbide mononitrate (IMDUR) 30 MG CR tablet Take 30 mg by mouth nightly     Historical Provider, MD   cloNIDine (CATAPRES) 0.2 MG tablet Take 0.2 mg by mouth 3 times daily.     Historical Provider, MD      Infusions:    diltiazem (CARDIZEM) 100 mg in dextrose 5% 100 mL ([facility]) Stopped (04/02/20 1403)    sodium chloride 150 mL/hr at 04/02/20 1410     PRN Meds:        Electronically signed by Higinio Dorsey MD on 4/2/2020 at 4:13 PM

## 2020-04-02 NOTE — ED NOTES
Bed: ED-02  Expected date:   Expected time:   Means of arrival:   Comments:  EMS 60F fever cough SOB 99.7 temp       Nasir Tellez RN  04/02/20 6704

## 2020-04-03 NOTE — PROGRESS NOTES
stable from multiple previous studies and most compatible with adenomas. No definite subcutaneous air or fluid collection within the visualized perianal soft tissues. Nodular density in the left lower lobe is most compatible with pleural thickening/scarring. Xr Chest Portable    Result Date: 4/2/2020  EXAMINATION: ONE XRAY VIEW OF THE CHEST 4/2/2020 1:15 pm COMPARISON: March 15, 2020. HISTORY: ORDERING SYSTEM PROVIDED HISTORY: chest pain     1. Enlarged cardiomediastinal silhouette with mild pulmonary venous congestion. No significant interval change. 2. No radiographic evidence of focal consolidation, or significant pleural effusion.        Scheduled Medicines   Medications:    sodium chloride  1,000 mL Intravenous Once    sodium chloride flush  10 mL Intravenous 2 times per day    enoxaparin  40 mg Subcutaneous Daily    insulin lispro  0-6 Units Subcutaneous TID WC    budesonide-formoterol  2 puff Inhalation BID    exemestane  25 mg Oral Daily    atorvastatin  20 mg Oral Nightly    levothyroxine  200 mcg Oral Daily    lactulose  10 g Oral Daily    isosorbide mononitrate  30 mg Oral Nightly    piperacillin-tazobactam  3.375 g Intravenous Q8H    vancomycin  1,500 mg Intravenous Q12H    bumetanide  0.5 mg Intravenous TID    insulin glargine  10 Units Subcutaneous Nightly    insulin lispro  0-6 Units Subcutaneous 2 times per day      Infusions:    dextrose           Objective:   Vitals: /62   Pulse 104   Temp 98.1 °F (36.7 °C) (Oral)   Resp 20   Ht 5' 4\" (1.626 m)   Wt 280 lb (127 kg)   LMP 01/03/2012   SpO2 96%   BMI 48.06 kg/m²   General appearance: alert and cooperative with exam  Neck: no JVD or bruit  Thyroid : Scar of thyroidectomy very faint lungs: Has Vesicular Breath sounds has some diminished breath sounds wheezing and rales  Heart:  regular rate and rhythm  Abdomen: soft, tender; bowel sounds normal; no masses,  no organomegaly has draining tube coming out of gallbladder surgery unable to remove gallbladder abscess before Dorinda abscess being drained she has been her life multiple occasions for this issue  Musculoskeletal: Normal  Extremities: extremities normal, , has pitting edema  Neurologic:  Awake, alert, oriented to name, place and time. Cranial nerves II-XII are grossly intact. Motor is  intact. Sensory neuropathy,  and gait is normal.    Assessment:     Patient Active Problem List:     H/O Calculus of ureter     Asthma     Obstructive sleep apnea     Rotator cuff arthropathy     Malignant neoplasm of left female breast (HCC)     S/P lymph node biopsy     Cirrhosis of liver without ascites (HCC)     Pain of left breast     S/P radiation therapy 4-12 wks ago     Diabetes mellitus with skin ulcer (HCC)     Mild persistent asthma without complication     WD-Unspecified open wound of abdominal wall, right lower quadrant without penetration into peritoneal cavity, initial encounter     Retroperitoneal lymphadenopathy     Liver cirrhosis secondary to GALLEGOS (nonalcoholic steatohepatitis) (HCC)     CHF (congestive heart failure), NYHA class I, acute on chronic, combined ([facility]     Hepatocellular carcinoma ([address] [facility].  Sepsis HCC)     E coli bacteremia     Sepsis ([facility]           Plan:     1. Reviewed POC blood glucose . Labs and X ray results   2. Reviewed Current Medicines   3. On meal/ Correction bolus Humalog/ Basal Lantus Insulin regime  4. Monitor Blood glucose frequently   5. Modified  the dose of Insulin/ other medicines as needed   6. Will follow     .      Kassidy Denton MD

## 2020-04-03 NOTE — CONSULTS
[facility] Continence Nurse  Consult Note       Mohit Middleton  AGE: 64 y.o.    GENDER: female  : 1960  TODAY'S DATE:  4/3/2020    Subjective:     Reason for  Evaluation and Assessment: wound assessment      Mohit Middleton is a 64 y.o. female referred by:   [x] Physician  [] Nursing  [] Other:     Wound Identification:  Wound Type: pressure  Contributing Factors: diabetes, chronic pressure, decreased mobility and obesity        PAST MEDICAL HISTORY        Diagnosis Date    Anxiety     Asthma     Cancer ([address]     breast(left) cancer- dx 10/2015- tx with radiation- following with Dr Waldo Cruz of liver, [facility])     liver    CHF (congestive heart failure) ([address]     Chronic ulcer of right thigh with fat layer exposed ([address]     COPD (chronic obstructive pulmonary disease) ([address]     follow with Dr Israel Maria Diabetes mellitus ([facility]     dx10+ yrs ago- follows with Dr Elza Mccartney    Diabetes mellitus with skin ulcer ([facility]     History of kidney stones     \"last one 3/2016- passed it- have had stones off and on for past 5 yrs follow with Dr Wendy Acuna Hypertension     Liver cirrhosis ([address]     for liver bx 7/10/2017    Morbid obesity ([address] 10/20/2015    PT TOO LARGE FOR MRI LEFT SHOULDER    On home oxygen therapy     oxygen at 4l/nc at hs    PONV (postoperative nausea and vomiting)     \"did get sick with the breast surgery\"    Sleep apnea     \"had sleep study several yrs ago- c-pap  does use it\"    Thyroid disease     WD-Local infection of skin and subcutaneous tissue 2017    WD-Ulcer of abdomen wall, limited to breakdown of skin ([address]     WD-Unspecified open wound of abdominal wall, right lower quadrant without penetration into peritoneal cavity, initial encounter        PAST SURGICAL HISTORY    Past Surgical History:   Procedure Laterality Date    ABDOMEN SURGERY N/A     BREAST SURGERY Left     EXCISION MASS\"lumpectomy- took out 3 lymph nodes all ok\"    CARDIAC CATHETERIZATION  01/21/2020    no intervention    CYSTOSCOPY Left 12/23/13    stent placement    LAPAROSCOPY N/A 3/2/2020    ATTEMPTED CHOLECYSTECTOMY LAPAROSCOPIC WITH IOC ABORTED BECAUSE OF LIVER CIRRHOSIS, CHOLECYSTOSTOMY TUBE PLACED, DRAIN PLACED performed by Dante Saravia MD at [address] [facility] [address]  07/10/2017    LIVER SURGERY      TACE procedure in Aug 2019   [address]    \"took most of the thyroid out\"    UPPER GASTROINTESTINAL ENDOSCOPY         FAMILY HISTORY    Family History   Problem Relation Age of Onset   Felicitas [place] Cancer Mother         breast    Diabetes Mother     Heart Disease Father         MI    Diabetes Father     Other Sister         cirhosis    Heart Disease Sister     Other Brother         cirhosis    Kidney Disease Brother     Asthma Sister         COPD    Cancer Brother         liver cancer       SOCIAL HISTORY    Social History     Tobacco Use    Smoking status: Former Smoker     Packs/day: 1.50     Years: 20.00     Pack years: 30.00     Last attempt to quit: 3/23/2005     Years since quitting: 15.0    Smokeless tobacco: Never Used   Substance Use Topics    Alcohol use: No     Alcohol/week: 0.0 standard drinks    Drug use: No       ALLERGIES    Allergies   Allergen Reactions    Lasix [Furosemide] Hives    Sulfa Antibiotics Rash    Nystatin Other (See Comments)     Pt reports \"burning\" sensation to skin    Tape [Adhesive Tape]        MEDICATIONS    No current facility-administered medications on file prior to encounter.       Current Outpatient Medications on File Prior to Encounter   Medication Sig Dispense Refill    alendronate (FOSAMAX) 70 MG tablet       lactulose (CHRONULAC) 10 GM/15ML solution       ferrous sulfate (SUKHI-PENNY) 325 (65 Fe) MG tablet Take 1 tablet by mouth every other day 60 tablet 0    metFORMIN (GLUCOPHAGE) 500 MG tablet   3    torsemide (DEMADEX) 20 MG tablet Take 1 tablet by mouth daily 90 tablet 1    spironolactone (ALDACTONE) 50 11:00 AM   Wound Cleansed Rinsed/Irrigated with saline 4/3/2020 11:00 AM   Wound Length (cm) 2.5 cm 4/3/2020 11:00 AM   Wound Width (cm) 1.5 cm 4/3/2020 11:00 AM   Wound Depth (cm) 0.1 cm 4/3/2020 11:00 AM   Wound Surface Area (cm^2) 3.75 cm^2 4/3/2020 11:00 AM   Wound Volume (cm^3) 0.38 cm^3 4/3/2020 11:00 AM   Distance Tunneling (cm) 0 cm 4/3/2020 11:00 AM   Tunneling Position ___ O'Clock 0 4/3/2020 11:00 AM   Undermining Starts ___ O'Clock 0 4/3/2020 11:00 AM   Undermining Ends___ O'Clock 0 4/3/2020 11:00 AM   Undermining Maxium Distance (cm) 0 4/3/2020 11:00 AM   Wound Assessment Red 4/3/2020 11:00 AM   Drainage Amount Small 4/3/2020 11:00 AM   Drainage Description Serosanguinous 4/3/2020 11:00 AM   Odor None 4/3/2020 11:00 AM   Margins Defined edges 4/3/2020 11:00 AM   Viola-wound Assessment Pink 4/3/2020 11:00 AM   Non-staged Wound Description Partial thickness 4/3/2020 11:00 AM   Red%Wound Bed 100 4/3/2020 11:00 AM   Number of days: 0       Wound 04/03/20 Buttocks Left (Active)   Wound Pressure Unstageable 4/3/2020 11:00 AM   Dressing Status Changed 4/3/2020 11:00 AM   Dressing Changed Changed/New 4/3/2020 11:00 AM   Wound Cleansed Rinsed/Irrigated with saline 4/3/2020 11:00 AM   Wound Length (cm) 2.8 cm 4/3/2020 11:00 AM   Wound Width (cm) 1.8 cm 4/3/2020 11:00 AM   Wound Depth (cm) 0.1 cm 4/3/2020 11:00 AM   Wound Surface Area (cm^2) 5.04 cm^2 4/3/2020 11:00 AM   Wound Volume (cm^3) 0.5 cm^3 4/3/2020 11:00 AM   Distance Tunneling (cm) 0 cm 4/3/2020 11:00 AM   Tunneling Position ___ O'Clock 0 4/3/2020 11:00 AM   Undermining Starts ___ O'Clock 0 4/3/2020 11:00 AM   Undermining Ends___ O'Clock 0 4/3/2020 11:00 AM   Undermining Maxium Distance (cm) 0 4/3/2020 11:00 AM   Wound Assessment Yellow;Red 4/3/2020 11:00 AM   Drainage Amount Small 4/3/2020 11:00 AM   Drainage Description Serosanguinous; Yellow 4/3/2020 11:00 AM   Odor None 4/3/2020 11:00 AM   Margins Defined edges 4/3/2020 11:00 AM   Viola-wound Assessment Red 4/3/2020 11:00 AM   Non-staged Wound Description Full thickness 4/3/2020 11:00 AM   Red%Wound Bed 50 4/3/2020 11:00 AM   Yellow%Wound Bed 50 4/3/2020 11:00 AM   Number of days: 0       Wound 04/03/20 Sacrum intergluteal cleft (Active)   Wound Pressure Unstageable 4/3/2020 11:00 AM   Dressing Status Changed 4/3/2020 11:00 AM   Dressing Changed Changed/New 4/3/2020 11:00 AM   Wound Cleansed Rinsed/Irrigated with saline 4/3/2020 11:00 AM   Wound Length (cm) 3.5 cm 4/3/2020 11:00 AM   Wound Width (cm) 0.7 cm 4/3/2020 11:00 AM   Wound Depth (cm) 0.2 cm 4/3/2020 11:00 AM   Wound Surface Area (cm^2) 2.45 cm^2 4/3/2020 11:00 AM   Wound Volume (cm^3) 0.49 cm^3 4/3/2020 11:00 AM   Distance Tunneling (cm) 0 cm 4/3/2020 11:00 AM   Tunneling Position ___ O'Clock 0 4/3/2020 11:00 AM   Undermining Starts ___ O'Clock 0 4/3/2020 11:00 AM   Undermining Ends___ O'Clock 0 4/3/2020 11:00 AM   Undermining Maxium Distance (cm) 0 4/3/2020 11:00 AM   Wound Assessment Yellow;Pink 4/3/2020 11:00 AM   Drainage Amount Small 4/3/2020 11:00 AM   Drainage Description Serosanguinous; Yellow 4/3/2020 11:00 AM   Odor None 4/3/2020 11:00 AM   Margins Defined edges 4/3/2020 11:00 AM   Viola-wound Assessment Red 4/3/2020 11:00 AM   Non-staged Wound Description Full thickness 4/3/2020 11:00 AM   [place]%Wound Bed 20 4/3/2020 11:00 AM   Yellow%Wound Bed 80 4/3/2020 11:00 AM   Number of days: 0       Response to treatment:  Well tolerated by patient. Pain Assessment:  Severity:  none  Quality of pain: na  Wound Pain Timing/Severity: na  Premedicated: no    Plan:     Plan of Care: Wound 04/03/20 Buttocks Right-Dressing/Treatment: (collagen, mepilex sacral border)  Wound 04/03/20 Buttocks Left-Dressing/Treatment: (puracol, mepilex border)  Wound 04/03/20 Sacrum intergluteal cleft-Dressing/Treatment: (puracol, mepilex border)     Pt in bed. Agreeable to wound assessment. Stated has been at [facility] recently. Wounds noted as above.  Pictures and measurements

## 2020-04-03 NOTE — PROGRESS NOTES
hypervolemia- l;ab pending - na expect to come up  2. ? Sepsis/ R/O COVID  3. underlying multiple malignancy / DM et   4. Recommendation/Plan  :     1. Keep loop IV  2. Watch UOP  3. Review   BMP , mg  4. If possible - abpvid zosyn - with vanco  5. Watch vanco level and abx toxicity   6. Follow clinically  7. Her  is my pt   8.  I will give him a call       Maryann Dela Cruz MD

## 2020-04-03 NOTE — PROGRESS NOTES
Hospitalist Progress Note      Name:  Farhad Ingram /Age/Sex: 1960  (64 y.o. female)   MRN & CSN:  [id number] & [id number] Admission Date/Time: 2020 12:44 PM   Location:  -A PCP: Asif Pineda MD         Hospital Day: 2    Assessment and Plan:   Farhad Ingram is a 64 y.o.  female  who presents with Sepsis ([address]    > Sepsis  > Dyspnea, cough, reported fever immunocompromised , Covid pending. > recent H/o E coli bacteremia, completed Abx course  > h/o cholecystitis s/p attempted cholecystectomy with 2 drains in place  > Decubitus ulcer.   - resp panel neg, lactate 2.6, blood cx pending, UA pending, CXR pulm congestion no consolidation, CT abd with known hepatocellular carcinoma, adenoma,   - IV Vanc, zosyn, Covid pending,   - pulm, ID, wound care consulted     >  Hyponatremia. -IVF, consulted nephro, repeat levels.      > Hypothyroidism:   - Low TSH, consulted endo , continue levothyroxine.     > Diabetes mellitus type 2   -Hold home meds, cover with lantus, SSI, consult endo       > H/o COPD   > Obstructive sleep apnea  -on CPAP at home,   -on oxygen at HS at home  - albuterol , symbicort  - consulted pulm     >Hypertension  -held meds due to concern about sepsis.       > GALLEGOS cirrhosis complicated by [address] and portal HTN  -Hold home diuretics due to concern about sepsis,   -Continue on home lactulose      >  Recent admission for acute cholecystitis. Per prior documentation   \"Surgery was consulted at [facility] management of the drains. Per [facility], plan is to remove drain near gallbladder draining serosanguinous fluid when output is less than 30 cc for 2 days. Plan to keep cholecystotomy drain with bilious output indefinitely given high risk for recurrent cholecystitis and she is a poor operative candidate.  She has follow up arranged with surgery at [facility] further management of the drains.   -Surgery was consulted also here in [facility] - and recommended keeping both drains in for

## 2020-04-03 NOTE — CONSULTS
bladder  plus it will be difficult for her to get up and urinate and will force  nurses to go again and again and this can cause for potential exposure. We have to be cognizant in the COVID pandemic time. She is on empirical  antibiotic, but I would avoid Zosyn with combination of vanco if  possible since she has a high risk of acute kidney injury. I am sure  the blood culture and other work was done and we will get the lab  tomorrow morning. Further plan will be dictated by her clinical course.         Aaron Gavin MD    D: 04/02/2020 20:40:18       T: 04/02/2020 22:48:22     [email or web address]  Job#: [id number]     Doc#: [id number]    CC:

## 2020-04-03 NOTE — CONSULTS
Substance Use Topics    Alcohol use: No     Alcohol/week: 0.0 standard drinks       Born:   Lived   Occupation:   No recent travel of significance.  No recent unusual exposures.  NO pets    ? ALLERGIES  Allergies   Allergen Reactions    Lasix [Furosemide] Hives    Sulfa Antibiotics Rash    Nystatin Other (See Comments)     Pt reports \"burning\" sensation to skin    Tape [Adhesive Tape]       MEDICATIONS  Reviewed and are per the chart/EMR. ? Antibiotics:   Vancomycin   Zosyn  ?  -------------------------------------------------------------------------------------------------------------------    Vital Signs:  Vitals:    04/03/20 0904   BP:    Pulse: 109   Resp: 14   Temp: 97.5 °F (36.4 °C)   SpO2:          Exam:    VS: noted; wt 127 kg  Gen: alert and oriented X3, no distress  Skin: no stigmata of endocarditis  Wounds: C/D/I  HEMT: AT/NC Oropharynx pink, moist, and without lesions or exudates; dentition in poor state of repair  Eyes: PERRLA, EOMI, conjunctiva pink, sclera anicteric. Neck: Supple. Trachea midline. No LAD. Chest: no distress and CTA. Good air movement. Heart: RRR and no MRG. Abd: RUQ drain, soft, non-distended, no tenderness, no hepatomegaly. Normoactive bowel sounds. Ext: no clubbing, cyanosis, or edema  Catheter Site: without erythema or tenderness  Neuro: Mental status intact. CN 2-12 intact and no focal sensory or motor deficits    ? Diagnostic Studies: reviewed  ? ? I have examined this patient and available medical records on this date and have made the above observations, conclusions and recommendations.   Electronically signed by: Electronically signed by Curtis Ayon MD on 4/3/2020 at 9:48 AM

## 2020-04-03 NOTE — CONSULTS
associated with body aches. She has no sick exposure, travel to [facility], no chest pain, no palpitations, no dizziness, no n/v. No diarrhea, no dysuria, no headaches. At this time she is being evaluated for the COVID-19. She is being treated with the Abx.     Past Medical History:      Diagnosis Date    Anxiety     Asthma     Cancer ([address]     breast(left) cancer- dx 10/2015- tx with radiation- following with Dr Keegan Blank of liver, primary [facility])     liver    CHF (congestive heart failure) ([address]     Chronic ulcer of right thigh with fat layer exposed ([address]     COPD (chronic obstructive pulmonary disease) ([address]     follow with Dr Mirlande Murphy Diabetes mellitus ([facility]     dx10+ yrs ago- follows with Dr Ronaldo Carlin    Diabetes mellitus with skin ulcer ([address]     History of kidney stones     \"last one 3/2016- passed it- have had stones off and on for past 5 yrs follow with Dr Tracy Davis Hypertension     Liver cirrhosis ([address]     for liver bx 7/10/2017    Morbid obesity ([address] 10/20/2015    PT TOO LARGE FOR MRI LEFT SHOULDER    On home oxygen therapy     oxygen at 4l/nc at hs    PONV (postoperative nausea and vomiting)     \"did get sick with the breast surgery\"    Sleep apnea     \"had sleep study several yrs ago- c-pap  does use it\"    Thyroid disease     WD-Local infection of skin and subcutaneous tissue 12/8/2017    WD-Ulcer of abdomen wall, limited to breakdown of skin ([address]     WD-Unspecified open wound of abdominal wall, right lower quadrant without penetration into peritoneal cavity, initial encounter       Past Surgical History:        Procedure Laterality Date    ABDOMEN SURGERY N/A     BREAST SURGERY Left 2015    EXCISION MASS\"lumpectomy- took out 3 lymph nodes all ok\"    CARDIAC CATHETERIZATION  01/21/2020    no intervention    CYSTOSCOPY Left 12/23/13    stent placement    LAPAROSCOPY N/A 3/2/2020    ATTEMPTED CHOLECYSTECTOMY LAPAROSCOPIC WITH IOC ABORTED BECAUSE OF LIVER CIRRHOSIS, CHOLECYSTOSTOMY TUBE PLACED, DRAIN PLACED performed by Ronnie Guerin MD at [address]  07/10/2017    LIVER SURGERY      TACE procedure in Aug 2019   [address]    \"took most of the thyroid out\"    UPPER GASTROINTESTINAL ENDOSCOPY       Current Medications:     sodium chloride  1,000 mL Intravenous Once    sodium chloride flush  10 mL Intravenous 2 times per day    enoxaparin  40 mg Subcutaneous Daily    insulin lispro  0-6 Units Subcutaneous TID WC    budesonide-formoterol  2 puff Inhalation BID    exemestane  25 mg Oral Daily    atorvastatin  20 mg Oral Nightly    levothyroxine  200 mcg Oral Daily    lactulose  10 g Oral Daily    isosorbide mononitrate  30 mg Oral Nightly    piperacillin-tazobactam  3.375 g Intravenous Q8H    vancomycin  1,500 mg Intravenous Q12H    bumetanide  0.5 mg Intravenous TID    insulin glargine  10 Units Subcutaneous Nightly    insulin lispro  0-6 Units Subcutaneous 2 times per day     Allergies:    Social History:    TOBACCO:   reports that she quit smoking about 15 years ago. She has a 30.00 pack-year smoking history. She has never used smokeless tobacco.  ETOH:   reports no history of alcohol use. Patient currently lives independently    Family History:       Problem Relation Age of Onset    Cancer Mother         breast    Diabetes Mother     Heart Disease Father         MI    Diabetes Father     Other Sister         cirhosis    Heart Disease Sister     Other Brother         cirhosis    Kidney Disease Brother     Asthma Sister         COPD    Cancer Brother         liver cancer       REVIEW OF SYSTEMS:    CONSTITUTIONAL:  negative for fevers, chills, diaphoresis, activity change, appetite change, fatigue, night sweats and unexpected weight change.    EYES:  negative for blurred vision, eye discharge, visual disturbance and icterus  HEENT:  negative for hearing loss, tinnitus, ear drainage, sinus pressure, nasal congestion, epistaxis and snoring  RESPIRATORY:  See HPI  CARDIOVASCULAR:  negative for chest pain, palpitations, exertional chest pressure/discomfort, edema, syncope  GASTROINTESTINAL:  negative for nausea, vomiting, diarrhea, constipation, blood in stool and abdominal pain  GENITOURINARY:  negative for frequency, dysuria, urinary incontinence, decreased urine volume, and hematuria  HEMATOLOGIC/LYMPHATIC:  negative for easy bruising, bleeding and lymphadenopathy  ALLERGIC/IMMUNOLOGIC:  negative for recurrent infections, angioedema, anaphylaxis and drug reactions  ENDOCRINE:  negative for weight changes and diabetic symptoms including polyuria, polydipsia and polyphagia  MUSCULOSKELETAL:  negative for  pain, joint swelling, decreased range of motion and muscle weakness  NEUROLOGICAL:  negative for headaches, slurred speech, unilateral weakness  PSYCHIATRIC/BEHAVIORAL: negative for hallucinations, behavioral problems, confusion and agitation. Objective:   PHYSICAL EXAM:      VITALS:  BP (!) 148/46   Pulse 103   Temp 97.9 °F (36.6 °C) (Oral)   Resp 14   Ht 5' 4\" (1.626 m)   Wt 280 lb (127 kg)   LMP 2012   SpO2 96%   BMI 48.06 kg/m²   24HR INTAKE/OUTPUT:      Intake/Output Summary (Last 24 hours) at 4/3/2020 1242  Last data filed at 4/3/2020 0348  Gross per 24 hour   Intake --   Output 1802 ml   Net -1802 ml     CURRENT PULSE OXIMETRY:  SpO2: 96 %  24HR PULSE OXIMETRY RANGE:  SpO2  Av.1 %  Min: 95 %  Max: 100 %    CONSTITUTIONAL:  awake, alert, cooperative, no apparent distress, and appears stated age  NECK:  Supple, symmetrical, trachea midline, no adenopathy, thyroid symmetric, not enlarged and no tenderness, skin normal  LUNGS:  no increased work of breathing and clear to auscultation. No accessory muscle use  CARDIOVASCULAR:  normal S1 and S2, no edema and no JVD  ABDOMEN:  normal bowel sounds, non-distended and no masses palpated, and no tenderness to palpation.  No hepatospleenomegaly  LYMPHADENOPATHY:

## 2020-04-03 NOTE — CONSULTS
Endocrinology   Consult Note  Dear Doctor Nadira Bain for the Consult     Pt. Was Admitted for : Cough fever shortness of breath and muscular aches and pains all over the body/suspect coronavirus    Reason for Consult: Better control of blood glucose and review also her thyroid function test    History Obtained From:  Patient/ EMR       HISTORY OF PRESENT ILLNESS:                The patient is a 64 y.o. female with significant past medical history of diabetes mellitus, thyroid cancer had thyroidectomy, breast cancer with mastectomy and also had radiation treatment, morbid obesity, COPD, recently diagnosed liver cancer, cirrhosis of liver, hypertension, hyperlipidemia, sleep apnea was recently diagnosed having gallbladder abscess had had gallbladder surgery with only has a draining tube but unable to do cholecystectomy yet patient has been in and out of the hospital here in [facility] in [facility].  She does recently discharged from here to be sent to [facility] from there back again here. Patient extubated complaining of cough fever and muscular aches and pains as she was admitted to hospital again this time to rule out coronavirus infection. I was  consulted for better and also review her thyroid function test.       ROS:   Pt's ROS done in detail. Abnormal ROS are noted in Medical and Surgical History Section below:      Other Medical History:        Diagnosis Date    Anxiety     Asthma     Cancer ([address]     breast(left) cancer- dx 10/2015- tx with radiation- following with Dr Kathy Castrejon of liver, [facility])     liver    CHF (congestive heart failure) ([address]     Chronic ulcer of right thigh with fat layer exposed ([address]     COPD (chronic obstructive pulmonary disease) ([address]     follow with Dr Carolyn Simmons Diabetes mellitus ([address]     dx10+ yrs ago- follows with Dr Aarceli Steel    Diabetes mellitus with skin ulcer ([address]     History of kidney stones \"last one 3/2016- passed it- have had stones off and on for past 5 yrs follow with Dr Lilly Blevins Hypertension     Liver cirrhosis ([facility]     for liver bx 7/10/2017    Morbid obesity ([facility] 10/20/2015    PT TOO LARGE FOR MRI LEFT SHOULDER    On home oxygen therapy     oxygen at 4l/nc at hs    PONV (postoperative nausea and vomiting)     \"did get sick with the breast surgery\"    Sleep apnea     \"had sleep study several yrs ago- c-pap  does use it\"    Thyroid disease     WD-Local infection of skin and subcutaneous tissue 12/8/2017    WD-Ulcer of abdomen wall, limited to breakdown of skin ([facility]     WD-Unspecified open wound of abdominal wall, right lower quadrant without penetration into peritoneal cavity, initial encounter      Surgical History:        Procedure Laterality Date    ABDOMEN SURGERY N/A     BREAST SURGERY Left 2015    EXCISION MASS\"lumpectomy- took out 3 lymph nodes all ok\"    CARDIAC CATHETERIZATION  01/21/2020    no intervention    CYSTOSCOPY Left 12/23/13    stent placement    LAPAROSCOPY N/A 3/2/2020    ATTEMPTED CHOLECYSTECTOMY LAPAROSCOPIC WITH IOC ABORTED BECAUSE OF LIVER CIRRHOSIS, CHOLECYSTOSTOMY TUBE PLACED, DRAIN PLACED performed by Areli Mcpherson MD at [address] [place] [address]  07/10/2017    LIVER SURGERY      TACE procedure in Aug 2019   Doctor [facility] [address]    \"took most of the thyroid out\"    UPPER GASTROINTESTINAL ENDOSCOPY         Allergies:  Lasix [furosemide]; Sulfa antibiotics;  Nystatin; and Tape [adhesive tape]    Family History:       Problem Relation Age of Onset   [address] Cancer Mother         breast    Diabetes Mother     Heart Disease Father         MI    Diabetes Father     Other Sister         cirhosis    Heart Disease Sister     Other Brother         cirhosis    Kidney Disease Brother     Asthma Sister         COPD    Cancer Brother         liver cancer     REVIEW OF SYSTEMS:  Review of System Done as noted above     PHYSICAL EXAM:      Vitals:    BP 125/62   Pulse 104   Temp 98.5 °F (36.9 °C) (Oral)   Resp 20   Ht 5' 4\" (1.626 m)   Wt 280 lb (127 kg)   LMP 01/03/2012   SpO2 96%   BMI 48.06 kg/m²     CONSTITUTIONAL:  awake, alert, cooperative, appears stated age   EYES:  vision intact Fundoscopic Exam not performed   ENT:Normal  NECK:  Supple, No JVD. Thyroid Exam:Normal   LUNGS:  Has Vesicular Breath Sounds, some rales and wheezing  CARDIOVASCULAR:  Normal apical impulse, regular rate and rhythm, normal S1 and S2, no S3 or S4, and has no  murmur   ABDOMEN:  No scars, normal bowel sounds, soft, non-distended, non-tender, no masses palpated, no hepatolienomegaly  Musculoskeletal: Normal  Extremities: Normal, peripheral pulses normal, , has moderate edema   NEUROLOGIC:  Awake, alert, oriented to name, place and time. Cranial nerves II-XII are grossly intact. Motor is  intact. Sensory i neuropathy t. ,  and gait is abnormal.    DATA:    CBC:   Recent Labs     04/02/20  1312   WBC 19.8*   HGB 10.0*   PLT 86*    CMP:  Recent Labs     04/02/20  1312   *   K 5.0   CL 96*   CO2 24   BUN 42*   CREATININE 1.0   CALCIUM 7.9*   PROT 7.8   LABALBU 2.4*   BILITOT 2.4*   ALKPHOS 175*   AST 48*   ALT 40     Lipids:   Lab Results   Component Value Date    CHOL 123 10/27/2019    HDL 54 10/27/2019    TRIG 69 10/27/2019     Glucose: No results for input(s): POCGLU in the last 72 hours. Hemoglobin A1C:   Lab Results   Component Value Date    LABA1C 6.0 04/02/2020     Free T4:   Lab Results   Component Value Date    T4FREE 1.29 09/15/2018     Free T3: No results found for: FT3  TSH High Sensitivity:   Lab Results   Component Value Date    TSHHS 0.233 04/02/2020       Ct Abdomen Pelvis W Iv Contrast    Result Date: 4/2/2020  EXAMINATION: CT OF THE ABDOMEN AND PELVIS WITH CONTRAST 4/2/2020 3:17 pm   .     11 cm mass lesion within the inferior right hepatic lobe, most compatible with known history of hepatocellular carcinoma.  Heterogeneous collection with air, fluid, and debris within the gallbladder fossa. A percutaneous drain is in place with its tip at the base of this collection. No free intraperitoneal air or fluid. Bilateral adrenal nodules, stable from multiple previous studies and most compatible with adenomas. No definite subcutaneous air or fluid collection within the visualized perianal soft tissues. Nodular density in the left lower lobe is most compatible with pleural thickening/scarring. Xr Chest Portable    Result Date: 4/2/2020  EXAMINATION: ONE XRAY VIEW OF THE CHEST 4/2/2020 1:15 pm COMPARISON: March 15, 2020. HISTORY: ORDERING SYSTEM PROVIDED HISTORY: chest pain        1. Enlarged cardiomediastinal silhouette with mild pulmonary venous congestion. No significant interval change. 2. No radiographic evidence of focal consolidation, or significant pleural effusion.        Scheduled Medicines   Medications:    sodium chloride  1,000 mL Intravenous Once    sodium chloride flush  10 mL Intravenous 2 times per day    enoxaparin  40 mg Subcutaneous Daily    insulin lispro  0-6 Units Subcutaneous TID     budesonide-formoterol  2 puff Inhalation BID    exemestane  25 mg Oral Daily    atorvastatin  20 mg Oral Nightly    levothyroxine  200 mcg Oral Daily    lactulose  10 g Oral Daily    isosorbide mononitrate  30 mg Oral Nightly    piperacillin-tazobactam  3.375 g Intravenous Q8H    [START ON 4/3/2020] vancomycin  1,500 mg Intravenous Q12H    bumetanide  0.5 mg Intravenous TID    insulin glargine  10 Units Subcutaneous Nightly    insulin lispro  0-6 Units Subcutaneous 2 times per day      Infusions:    dextrose           IMPRESSION    Patient Active Problem List   Diagnosis    Calculus of ureter    Asthma    Obstructive sleep apnea    Breast mass, left    Rotator cuff arthropathy    Malignant neoplasm of left female breast (HCC)    History of breast lump/mass excision    S/P lymph node biopsy    Cirrhosis of liver without ascites ([address]   

## 2020-04-04 NOTE — PROGRESS NOTES
Progress Note( Dr. Ally Amaya)  4/4/2020  Subjective:   Admit Date: 4/2/2020  PCP: Corry Tinoco MD    Admitted For :  Cough fever shortness of breath and muscular aches and pains all over the body/suspect coronavirus    Consulted For: Better control of blood glucose and also evaluate thyroid function    Interval History: Seems to be somewhat better still short of breath coronavirus tests are not back yet  Patient  COVID  19 test came back as negative    Denies any chest pains,   Has SOB . Also on breathing treatment  Denies nausea or vomiting. No new bowel or bladder symptoms. Intake/Output Summary (Last 24 hours) at 4/4/2020 0813  Last data filed at 4/4/2020 0411  Gross per 24 hour   Intake --   Output 1150 ml   Net -1150 ml       DATA    CBC:   Recent Labs     04/02/20  1312   WBC 19.8*   HGB 10.0*   PLT 86*    CMP:  Recent Labs     04/02/20  1312   *   K 5.0   CL 96*   CO2 24   BUN 42*   CREATININE 1.0   CALCIUM 7.9*   PROT 7.8   LABALBU 2.4*   BILITOT 2.4*   ALKPHOS 175*   AST 48*   ALT 40     Lipids:   Lab Results   Component Value Date    CHOL 123 10/27/2019    HDL 54 10/27/2019    TRIG 69 10/27/2019     Glucose:  Recent Labs     04/03/20  2046 04/04/20  0227 04/04/20  0732   POCGLU 248* 133* 161*     [id number]:  Lab Results   Component Value Date    LABA1C 6.0 04/02/2020     High Sensitivity TSH:   Lab Results   Component Value Date    TSHHS 0.233 04/02/2020     Free T3: No results found for: FT3  Free T4:  Lab Results   Component Value Date    T4FREE 0.68 04/02/2020       Ct Abdomen Pelvis W Iv Contrast    Result Date: 4/2/2020  EXAMINATION: CT OF THE ABDOMEN AND PELVIS WITH CONTRAST 4/2/2020 3:17 pm  .     11 cm mass lesion within the inferior right hepatic lobe, most compatible with known history of hepatocellular carcinoma. Heterogeneous collection with air, fluid, and debris within the gallbladder fossa. A percutaneous drain is in place with its tip at the base of this collection. sounds has some diminished breath sounds wheezing and rales  Heart:  regular rate and rhythm  Abdomen: soft, tender; bowel sounds normal; no masses,  no organomegaly has draining tube coming out of gallbladder surgery unable to remove gallbladder abscess before Dorinda abscess being drained she has been her life multiple occasions for this issue  Musculoskeletal: Normal  Extremities: extremities normal, , has pitting edema  Neurologic:  Awake, alert, oriented to name, place and time. Cranial nerves II-XII are grossly intact. Motor is  intact. Sensory neuropathy,  and gait is normal.    Assessment:     Patient Active Problem List:     H/O Calculus of ureter     Asthma     Obstructive sleep apnea     Rotator cuff arthropathy     Malignant neoplasm of left female breast (HCC)     S/P lymph node biopsy     Cirrhosis of liver without ascites (HCC)     Pain of left breast     S/P radiation therapy 4-12 wks ago     Diabetes mellitus with skin ulcer (HCC)     Mild persistent asthma without complication     WD-Unspecified open wound of abdominal wall, right lower quadrant without penetration into peritoneal cavity, initial encounter     Retroperitoneal lymphadenopathy     Liver cirrhosis secondary to GALLEGOS (nonalcoholic steatohepatitis) (HCC)     CHF (congestive heart failure), NYHA class I, acute on chronic, combined ([address]     Hepatocellular carcinoma ([address]  Sepsis HCC)     E coli bacteremia     Sepsis ([address]           Plan:     1. Reviewed POC blood glucose . Labs and X ray results   2. Reviewed Current Medicines   3. On meal/ Correction bolus Humalog/ Basal Lantus Insulin regime  4. Monitor Blood glucose frequently   5. Modified  the dose of Insulin/ other medicines as needed  6. Patient transferred to non-ICU for now  7. Will follow     .      Grayson Priest MD

## 2020-04-04 NOTE — CONSULTS
PICC Consult Complete. Education regarding insertion of PICC reviewed with patient. Risk/benefit/and alternatives discussed. verbalized understanding. Consent given by patient. Time out done at 1825. PICC line inserted right upper arm  without difficulty per protocol. Pt tolerated well. Good blood return and flushes easily. ECG tip confirmation system utilized for tip placement with P wave changes noted by RN during insertion and ECG strips left on chart. Educational booklet left at bedside.    Delfina STAPLETON notified OK to use PICC Mary Sow

## 2020-04-04 NOTE — CONSULTS
PHARMACY VANCOMYCIN MONITORING SERVICE    Carmen Antunez is a 64 y.o. female started on vancomycin for sepsis. Patient does have history of e coli bacteremia in March. Pharmacy consulted by Dr. Rossy Flaherty for monitoring and adjustment. Indication for treatment: Sepsis (h/o e coli bacteremia in March 2020)  Goal trough: 15-20 mcg/mL  Other antimicrobials: Piperacillin-Tazobactam     Pertinent Laboratory Values:   Temp Readings from Last 3 Encounters:   04/04/20 97.5 °F (36.4 °C) (Oral)   03/17/20 98.5 °F (36.9 °C) (Oral)   03/05/20 97.5 °F (36.4 °C) (Axillary)     Recent Labs     04/02/20  1312 04/04/20  1502   WBC 19.8* 13.4*   LACTATE 2.6  LACT CALLED TO ER, MEL MATTSON RN ON [id number] AT 1408 BY [facility]  RESULTS READ BACK  * 2.9*     Recent Labs     04/02/20  1312 04/04/20  1502   BUN 42* 34*   CREATININE 1.0 1.0     Estimated Creatinine Clearance: 68 mL/min (based on SCr of 1 mg/dL). Intake/Output Summary (Last 24 hours) at 4/4/2020 1945  Last data filed at 4/4/2020 1107  Gross per 24 hour   Intake 240 ml   Output 2150 ml   Net -1910 ml       Pertinent Cultures:  Date    Source    Results  4/2/2020  RESP Dz Panel  Negative  4/2/2020  Blood    Collected  4/2/2020  COVID Test   Collected    Vancomycin level:   TROUGH:    Recent Labs     04/04/20  1502   VANCOTROUGH 24.4*     RANDOM:  No results for input(s): VANCORANDOM in the last 72 hours. Assessment:  · WBC and temperature: WBC elevated, but trending down; Afebrile. · SCr, BUN, and urine output: Scr WNL, UOP is good  · Day(s) of therapy: #3  · Vancomycin level: 24.4 mcg/ml - supra-therapeutic level    Plan:  · Adjust vancomycin dosing to vancomycin 1500 mg IVPB q18h. · Monitor renal trends closely while ordered on vancomycin and piperacillin-tazobactam.  · Check trough levelon 04/06/20 @ 2130. · Pharmacy will continue to monitor renal function, clinical status & recommend de-escalation if appropriate.       Thank you for the consult,    Janelle Donato

## 2020-04-04 NOTE — CARE COORDINATION
CM reviewed chart. CM attempted to call pt w/nio answer. CM spoke to pt's spouse, Mahsa Veras. Mahsa Veras said pt was getting around pretty good and was working w/ PT/OT through [address] [place] [address] Rd. Pt has walker and WC at home, declines need for shower bench as pt can shower at this time d/t DEMETRIO's. Pt does have 5 steps to enter home, which sp reports pt is unable to do and requests transportable be plan by CM. CM posted sticky note to MD and treatment team regarding [address] [place] [address] Rd. Referral was faxed.   CM will con't to follow

## 2020-04-04 NOTE — PLAN OF CARE
Problem: Safety:  Goal: Free from accidental physical injury  Description: Free from accidental physical injury  Outcome: Ongoing  Goal: Free from intentional harm  Description: Free from intentional harm  Outcome: Ongoing     Problem: Daily Care:  Goal: Daily care needs are met  Description: Daily care needs are met  Outcome: Ongoing     Problem: Skin Integrity:  Goal: Skin integrity will stabilize  Description: Skin integrity will stabilize  Outcome: Ongoing     Problem: Discharge Planning:  Goal: Patients continuum of care needs are met  Description: Patients continuum of care needs are met  Outcome: Ongoing     Problem: Nutrition  Goal: Optimal nutrition therapy  Outcome: Ongoing     Problem: Pain:  Goal: Pain level will decrease  Description: Pain level will decrease  Outcome: Ongoing  Goal: Control of acute pain  Description: Control of acute pain  Outcome: Ongoing  Goal: Control of chronic pain  Description: Control of chronic pain  Outcome: Ongoing

## 2020-04-04 NOTE — PROGRESS NOTES
appropriate.     Medications:   Medications:    lidocaine PF  5 mL Intradermal Once    sodium chloride flush  10 mL Intravenous 2 times per day    collagenase   Topical Daily    meropenem  1 g Intravenous Q8H    sodium chloride  1,000 mL Intravenous Once    sodium chloride flush  10 mL Intravenous 2 times per day    enoxaparin  40 mg Subcutaneous Daily    insulin lispro  0-6 Units Subcutaneous TID     budesonide-formoterol  2 puff Inhalation BID    exemestane  25 mg Oral Daily    atorvastatin  20 mg Oral Nightly    levothyroxine  200 mcg Oral Daily    lactulose  10 g Oral Daily    isosorbide mononitrate  30 mg Oral Nightly    vancomycin  1,500 mg Intravenous Q12H    bumetanide  0.5 mg Intravenous TID    insulin glargine  10 Units Subcutaneous Nightly    insulin lispro  0-6 Units Subcutaneous 2 times per day      Infusions:    dextrose       PRN Meds: sodium chloride flush, 10 mL, PRN  sodium chloride flush, 10 mL, PRN  acetaminophen, 650 mg, Q6H PRN    Or  acetaminophen, 650 mg, Q6H PRN  polyethylene glycol, 17 g, Daily PRN  promethazine, 12.5 mg, Q6H PRN    Or  ondansetron, 4 mg, Q6H PRN  glucose, 15 g, PRN  dextrose, 12.5 g, PRN  glucagon (rDNA), 1 mg, PRN  dextrose, 100 mL/hr, PRN  albuterol sulfate HFA, 2 puff, Q6H PRN  zolpidem, 5 mg, Nightly PRN          Electronically signed by Greg Bob MD on 4/4/2020 at 10:25 AM

## 2020-04-05 NOTE — PROGRESS NOTES
Pulmonary and Critical Care  Progress Note      VITALS:  BP (!) 139/58   Pulse 97   Temp 98.3 °F (36.8 °C) (Oral)   Resp 16   Ht 5' 4\" (1.626 m)   Wt 205 lb 7.5 oz (93.2 kg)   LMP 01/03/2012   SpO2 98%   BMI 35.27 kg/m²     Subjective:   CHIEF COMPLAINT :cough, fever, Dyspnea and body aches     HPI:                The patient is a 64 y.o. female with significant past medical history of Morbid Obesity, Hypothyrodism, SAEID, COPD- 30 pk yr smoker, GALLEGOS Cirrhosis, recent acute cholecystitis s/p cholecystectomy with drain, Hepatocellular Ca s/p TACE await liver transplant  presents with complaints of cough, fever, SOB x few days associated with body aches. She has no sick exposure, travel to [facility], no chest pain, no palpitations, no dizziness, no n/v. No diarrhea, no dysuria, no headaches. At this time she is being evaluated for the COVID-19. She is being treated with the Abx. Objective:   PHYSICAL EXAM:    LUNGS:Decreased air entry bilateral bases  Abd- soft, BS+, NT  Ext - no pedal edema  CVS-s1s2, no murmurs      DATA:    CBC:  Recent Labs     04/02/20  1312 04/04/20  1502 04/05/20  0200   WBC 19.8* 13.4* 14.0*   RBC 3.68* 3.57* 3.46*   HGB 10.0* 9.6* 9.2*   HCT 32.5* 35.4* 30.2*   PLT 86* 71* 76*   MCV 88.3 99.2 87.3   MCH 27.2 26.9* 26.6*   MCHC 30.8* 27.1* 30.5*   RDW 18.8* 19.4* 19.0*   SEGSPCT 85.3* 68.6* 68.5*      BMP:  Recent Labs     04/02/20  1312 04/04/20  1502 04/05/20  0200 04/05/20  0445   * 129*  --  133*   K 5.0 4.9  --  4.3   CL 96* 96*  --  96*   CO2 24 14*  --  25   BUN 42* 34*  --  32*   CREATININE 1.0 1.0 1.0 0.9   CALCIUM 7.9* 7.6*  --  7.7*   GLUCOSE 192* 184*  --  143*      ABG:  No results for input(s): PH, PO2ART, [id number], HCO3, BEART, O2SAT in the last 72 hours. BNP  Lab Results   Component Value Date    BNP 18 02/11/2014      D-Dimer:  Lab Results   Component Value Date    DDIMER 401 (H) 02/06/2018      1.  Radiology: None      Assessment/Plan     Patient Active Problem List

## 2020-04-05 NOTE — PROGRESS NOTES
[address] [facility]  consulted by Dr. Carlene Holt for monitoring and adjustment. Indication for treatment: Sepsis (h/o e coli bacteremia in March 2020)  Goal trough: 15-20 mcg/mL     Pertinent Laboratory Values:   Temp Readings from Last 3 Encounters:   04/05/20 98.3 °F (36.8 °C) (Oral)   03/17/20 98.5 °F (36.9 °C) (Oral)   03/05/20 97.5 °F (36.4 °C) (Axillary)     Recent Labs     04/02/20  1312 04/04/20  1502 04/05/20  0200   WBC 19.8* 13.4* 14.0*   LACTATE 2.6  LACT CALLED TO ER, MEL MATTSON RN ON [id number] AT [facility]  RESULTS READ BACK  * 2.9*  --      Recent Labs     04/02/20  1312 04/04/20  1502 04/05/20  0200 04/05/20  0445   BUN 42* 34*  --  32*   CREATININE 1.0 1.0 1.0 0.9     Estimated Creatinine Clearance: 75 mL/min (based on SCr of 0.9 mg/dL). Intake/Output Summary (Last 24 hours) at 4/5/2020 1042  Last data filed at 4/5/2020 0536  Gross per 24 hour   Intake 360 ml   Output 2840 ml   Net -2480 ml       Pertinent Cultures:  Date    Source    Results  4/2/2020  RESP Panel   Negative  4/2/2020  Blood    Collected  4/2/2020  COVID 19   Negative    Vancomycin level:   TROUGH:    Recent Labs     04/04/20  1502   VANCOTROUGH 24.4*     RANDOM:  No results for input(s): VANCORANDOM in the last 72 hours.     Assessment:  · WBC and temperature: WBC elevated; afebrile  · SCr, BUN, and urine output: stable  · Day(s) of therapy: #4  · Vancomycin level:   · 4/4: 24.4 mcg/ml - supratherapeutic (1500 mg q12h)     Plan:  · Dose decreased to vancomycin 1500 mg q18h IVPB   · Check trough level on 04/06/20 @ 2130  · Pharmacy will continue to monitor     Thank you for the consult,    Naila Santillan, PharmD, [facility]

## 2020-04-05 NOTE — CONSULTS
MD Mirian        enoxaparin (LOVENOX) injection 40 mg  40 mg Subcutaneous Daily Sebas Ruelas MD   40 mg at 04/05/20 0849    glucose (GLUTOSE) 40 % oral gel 15 g  15 g Oral PRN Sebas Ruelas MD        dextrose 50 % IV solution  12.5 g Intravenous PRN Sebas Ruelas MD        glucagon (rDNA) injection 1 mg  1 mg Intramuscular PRN Sebas Ruelas MD        dextrose 5 % solution  100 mL/hr Intravenous PRN Sebas Ruelas MD        insulin lispro (HUMALOG) injection vial 0-6 Units  0-6 Units Subcutaneous TID WC Sebas Ruelas MD   1 Units at 04/05/20 0851    budesonide-formoterol (SYMBICORT) 160-4.5 MCG/ACT inhaler 2 puff  2 puff Inhalation BID Sebas Ruelas MD   2 puff at 04/05/20 0857    exemestane (AROMASIN) tablet 25 mg  25 mg Oral Daily Sebas Ruelas MD   25 mg at 04/05/20 1056    atorvastatin (LIPITOR) tablet 20 mg  20 mg Oral Nightly Sebas Ruelas MD   20 mg at 04/04/20 2128    levothyroxine (SYNTHROID) tablet 200 mcg  200 mcg Oral Daily Oscar Vela MD   200 mcg at 04/05/20 0502    lactulose (CHRONULAC) 10 GM/15ML solution 10 g  10 g Oral Daily Sebas Ruelas MD   10 g at 04/05/20 0855    isosorbide mononitrate (IMDUR) extended release tablet 30 mg  30 mg Oral Nightly Sebas Ruelas MD   30 mg at 04/04/20 2128    insulin glargine (LANTUS) injection vial 10 Units  10 Units Subcutaneous Nightly Raúl Lowery MD   10 Units at 04/04/20 2126    insulin lispro (HUMALOG) injection vial 0-6 Units  0-6 Units Subcutaneous 2 times per day Raúl Lowery MD   1 Units at 04/04/20 2126    zolpidem (AMBIEN) tablet 5 mg  5 mg Oral Nightly PRN Vivian Balbuena PA-C   5 mg at 04/04/20 2155     Review of Systems:   · Constitutional: No Fever or Weight Loss   · Eyes: No Decreased Vision  · ENT: No Headaches, Hearing Loss or Vertigo  · Cardiovascular: No chest pain, dyspnea on exertion, palpitations or loss of consciousness  · Respiratory: No cough or wheezing    · Gastrointestinal: No abdominal pain, appear normal, No masses or lesions. No discharge. No CVA tenderness. Musculoskeletal:  Intact distal pulses, No edema, No tenderness, No cyanosis, No clubbing. Good range of motion in all major joints. No tenderness to palpation or major deformities noted. Back- No tenderness. Integument:  Warm, Dry, No erythema, No rash. Skin: no rash, no ulcers  Lymphatic:  No lymphadenopathy noted. Neurologic:  Alert & oriented x 3, Normal motor function, Normal sensory function, No focal deficits noted. Psychiatric:  Affect normal, Judgment normal, Mood normal.   Lab Review   Recent Labs     04/05/20  0200   WBC 14.0*   HGB 9.2*   HCT 30.2*   PLT 76*      Recent Labs     04/04/20  1502  04/05/20  0445   *  --  133*   K 4.9  --  4.3   CL 96*  --  96*   CO2 14*  --  25   PHOS 2.7  --   --    BUN 34*  --  32*   CREATININE 1.0   < > 0.9    < > = values in this interval not displayed. Recent Labs     04/04/20  1502   AST 62*   ALT 36   BILITOT 2.0*   ALKPHOS 169*     No results for input(s): TROPONINI in the last 72 hours. Lab Results   Component Value Date    BNP 18 02/11/2014    BNP 29 04/27/2013     Lab Results   Component Value Date    INR 1.80 03/05/2020    PROTIME 21.9 (H) 03/05/2020         EKG:sinus with APCs    Chest Xray:  1. Enlarged cardiomediastinal silhouette with mild pulmonary venous   congestion.  No significant interval change. ECHO: normal LVEF  Labs, echo, meds reviewed  Assessment: 64 y. o.year old with PMH of  has a past medical history of Anxiety, Asthma, Cancer ([address] Cancer of liver, primary ([address] CHF (congestive heart failure) ([address] Chronic ulcer of right thigh with fat layer exposed ([address] COPD (chronic obstructive pulmonary disease) ([address] Diabetes mellitus ([address] Diabetes mellitus with skin ulcer ([address] History of kidney stones, Hypertension, Liver cirrhosis ([address] Morbid obesity ([address] On home oxygen therapy, PONV (postoperative nausea and vomiting), Sleep apnea, Thyroid disease, WD-Local infection of skin and subcutaneous tissue, WD-Ulcer of abdomen wall, limited to breakdown of skin ([address] and WD-Unspecified open wound of abdominal wall, right lower quadrant without penetration into peritoneal cavity, initial encounter. Recommendations:    1. NSVT: most likely from metabolic derangements, her Mg was low 1.4 amd sepsis induced, WILL replace magnesium  2. Sepsis: as per medicine  3. H/o cholesystitis and drainage: stable, continue antibiotics  4. HTN: stable:   5. DYSLIPIDEMIA: stable,she is awaiting liver transplant, will need GI to discuss if statins can be continued  6. Cirrhosis: as ABOVE  7. COPD:stable, continue inhalers  8. Dm:stable, continue insulin  9. H/O CHF;'STABLE  10. Health maintenance: exerise and diet  All labs, medications and tests reviewed, continue all other medications of all above medical condition listed as is.          Musa Goldstein MD, 4/5/2020 11:57 AM

## 2020-04-05 NOTE — PROGRESS NOTES
Progress Note( Dr. Charlene Rodriguez)  4/5/2020  Subjective:   Admit Date: 4/2/2020  PCP: Kassidy Denton MD    Admitted For :  Cough fever shortness of breath and muscular aches and pains all over the body/suspect coronavirus    Consulted For: Better control of blood glucose and also evaluate thyroid function    Interval History: Seems to be somewhat better still short of breath coronavirus tests are not back yet  Patient  COVID  19 test came back as negative    Denies any chest pains,   Has SOB . Also on breathing treatment  Denies nausea or vomiting. No new bowel or bladder symptoms.        Intake/Output Summary (Last 24 hours) at 4/5/2020 0818  Last data filed at 4/5/2020 0536  Gross per 24 hour   Intake 360 ml   Output 2840 ml   Net -2480 ml       DATA    CBC:   Recent Labs     04/02/20  1312 04/04/20  1502 04/05/20  0200   WBC 19.8* 13.4* 14.0*   HGB 10.0* 9.6* 9.2*   PLT 86* 71* 76*    CMP:  Recent Labs     04/02/20  1312 04/04/20  1502 04/05/20  0200 04/05/20  0445   * 129*  --  133*   K 5.0 4.9  --  4.3   CL 96* 96*  --  96*   CO2 24 14*  --  25   BUN 42* 34*  --  32*   CREATININE 1.0 1.0 1.0 0.9   CALCIUM 7.9* 7.6*  --  7.7*   PROT 7.8 6.9  --   --    LABALBU 2.4* 2.1*  --   --    BILITOT 2.4* 2.0*  --   --    ALKPHOS 175* 169*  --   --    AST 48* 62*  --   --    ALT 40 36  --   --      Lipids:   Lab Results   Component Value Date    CHOL 123 10/27/2019    HDL 54 10/27/2019    TRIG 69 10/27/2019     Glucose:  Recent Labs     04/04/20  2125 04/05/20  0151 04/05/20  0748   POCGLU 181* 182* 148*     [id number]:  Lab Results   Component Value Date    LABA1C 6.0 04/02/2020     High Sensitivity TSH:   Lab Results   Component Value Date    TSHHS 0.233 04/02/2020     Free T3: No results found for: FT3  Free T4:  Lab Results   Component Value Date    T4FREE 0.68 04/02/2020       Ct Abdomen Pelvis W Iv Contrast    Result Date: 4/2/2020  EXAMINATION: CT OF THE ABDOMEN AND PELVIS WITH CONTRAST 4/2/2020 3:17 pm  .

## 2020-04-06 NOTE — PROGRESS NOTES
ambulates household distances mod I with RW. Pt currently presents with the above impairments, and would benefit from continued OT services in SNF at discharge. Complexity: Moderate  Prognosis: Good, Fair  Plan: 3x/week      Goals:  1. Pt will complete all aspects of bed mobility for EOB/OOB ADLs CGA  2. Pt will complete UB/LB bathing min A with setup using long handled sponge PRN  3. Pt will complete all aspects of LB dressing mod A with setup using AE PRN  4. Pt will complete all functional transfers to and from bed, chair, toilet, shower chair SBA/good safety awareness  5. Pt will ambulate HH distance to bathroom for toileting CGA with RW  6. Pt will complete all aspects of toileting task CGA  7. Pt will complete oral hygiene/grooming routine in standing at sink CGA with setup/no seated rest breaks  8.  Pt will complete ther ex/ther act with focus on light UE strengthening and functional standing tolerance >5 minutes      Time:   Time in: 1320  Time out: 1344  Timed treatment minutes: 9  Total time: 24      Electronically signed by:    YARED Summers/[place], [place], [id number]

## 2020-04-06 NOTE — PROGRESS NOTES
Progress Note( Dr. Charlene Rodriguez)  4/6/2020  Subjective:   Admit Date: 4/2/2020  PCP: Kassidy Denton MD    Admitted For :  Cough fever shortness of breath and muscular aches and pains all over the body/suspect coronavirus    Consulted For: Better control of blood glucose and also evaluate thyroid function    Interval History: Seems to be somewhat better still short of breath coronavirus tests are not back yet  Patient  COVID  19 test came back as negative    Denies any chest pains,   Has SOB . Also on breathing treatment  Denies nausea or vomiting. No new bowel or bladder symptoms.        Intake/Output Summary (Last 24 hours) at 4/6/2020 0731  Last data filed at 4/6/2020 0335  Gross per 24 hour   Intake 490 ml   Output 2400 ml   Net -1910 ml       DATA    CBC:   Recent Labs     04/04/20  1502 04/05/20  0200 04/06/20  0327   WBC 13.4* 14.0* 13.6*   HGB 9.6* 9.2* 8.8*   PLT 71* 76* 84*    CMP:  Recent Labs     04/04/20  1502 04/05/20  0200 04/05/20  0445 04/06/20  0327   *  --  133* 134*   K 4.9  --  4.3 4.1   CL 96*  --  96* 97*   CO2 14*  --  25 27   BUN 34*  --  32* 34*   CREATININE 1.0 1.0 0.9 1.1   CALCIUM 7.6*  --  7.7* 7.7*   PROT 6.9  --   --   --    LABALBU 2.1*  --   --   --    BILITOT 2.0*  --   --   --    ALKPHOS 169*  --   --   --    AST 62*  --   --   --    ALT 36  --   --   --      Lipids:   Lab Results   Component Value Date    CHOL 123 10/27/2019    HDL 54 10/27/2019    TRIG 69 10/27/2019     Glucose:  Recent Labs     04/05/20  1750 04/05/20 2022 04/06/20  0119   POCGLU 181* 193* 212*     [id number]:  Lab Results   Component Value Date    LABA1C 6.0 04/02/2020     High Sensitivity TSH:   Lab Results   Component Value Date    TSHHS 0.233 04/02/2020     Free T3: No results found for: FT3  Free T4:  Lab Results   Component Value Date    T4FREE 0.68 04/02/2020       Ct Abdomen Pelvis W Iv Contrast    Result Date: 4/2/2020  EXAMINATION: CT OF THE ABDOMEN AND PELVIS WITH CONTRAST 4/2/2020 3:17 pm  .     11 cm mass lesion within the inferior right hepatic lobe, most compatible with known history of hepatocellular carcinoma. Heterogeneous collection with air, fluid, and debris within the gallbladder fossa. A percutaneous drain is in place with its tip at the base of this collection. No free intraperitoneal air or fluid. Bilateral adrenal nodules, stable from multiple previous studies and most compatible with adenomas. No definite subcutaneous air or fluid collection within the visualized perianal soft tissues. Nodular density in the left lower lobe is most compatible with pleural thickening/scarring. Xr Chest Portable    Result Date: 4/2/2020  EXAMINATION: ONE XRAY VIEW OF THE CHEST 4/2/2020 1:15 pm COMPARISON: March 15, 2020. HISTORY: ORDERING SYSTEM PROVIDED HISTORY: chest pain     1. Enlarged cardiomediastinal silhouette with mild pulmonary venous congestion. No significant interval change. 2. No radiographic evidence of focal consolidation, or significant pleural effusion.        Scheduled Medicines   Medications:    insulin glargine  15 Units Subcutaneous Nightly    metFORMIN  500 mg Oral BID WC    torsemide  20 mg Oral Daily    vancomycin  1,500 mg Intravenous Q18H    sodium chloride flush  10 mL Intravenous 2 times per day    collagenase   Topical Daily    meropenem  1 g Intravenous Q8H    sodium chloride  1,000 mL Intravenous Once    sodium chloride flush  10 mL Intravenous 2 times per day    enoxaparin  40 mg Subcutaneous Daily    insulin lispro  0-6 Units Subcutaneous TID WC    budesonide-formoterol  2 puff Inhalation BID    exemestane  25 mg Oral Daily    atorvastatin  20 mg Oral Nightly    levothyroxine  200 mcg Oral Daily    lactulose  10 g Oral Daily    isosorbide mononitrate  30 mg Oral Nightly    insulin lispro  0-6 Units Subcutaneous 2 times per day      Infusions:    dextrose           Objective:   Vitals: BP (!) 152/67   Pulse 94   Temp 97.3 °F (36.3 °C)

## 2020-04-06 NOTE — PROGRESS NOTES
treatment well         Restrictions  Restrictions/Precautions  Restrictions/Precautions: General Precautions, Fall Risk  Vision/Hearing  Vision: Within Functional Limits  Hearing: Within functional limits     Subjective  General  Chart Reviewed: Yes  Patient assessed for rehabilitation services?: Yes  Family / Caregiver Present: No  Follows Commands: Within Functional Limits  Pain Screening  Patient Currently in Pain: Denies  Vital Signs  Patient Currently in Pain: Denies       Orientation  Orientation  Overall Orientation Status: Within Normal Limits  Social/Functional History  Social/Functional History  Lives With: Spouse  Type of Home: House  Home Layout: One level  Home Access: Stairs to enter with rails  Entrance Stairs - Number of Steps: 3  Entrance Stairs - Rails: Both  Bathroom Shower/Tub: Tub/Shower unit(has recently been sponge bathing)  Bathroom Toilet: Handicap height  Bathroom Equipment: Toilet raiser  Home Equipment: Rolling walker, [employer], Lift chair, Oxygen(2L o2)  ADL Assistance: Independent  Homemaking Assistance: Needs assistance(spouse manages)  Homemaking Responsibilities: No  Ambulation Assistance: Independent(mod I with RW household distances)  Transfer Assistance: Independent  Active : No  Occupation: Retired  Leisure & Hobbies: Grandchildren  Cognition   Cognition  Overall Cognitive Status: WNL    Objective             Strength RLE  Comment: ankle DF: 4/5, knee extension: at least 3/5 observed functionally  Strength LLE  Comment: ankle DF: 4/5, knee extension: at least 3/5 observed functionally     Sensation  Overall Sensation Status: WNL  Bed mobility  Supine to Sit: Minimal assistance(with verbal and tactile cues for BUE and BLE placement throuhout transfer; with HOB elevated; with increased time for task completion)  Sit to Supine:  Moderate assistance  Scooting: Dependent/Total;Maximal assistance           Balance  Posture: Fair(forward head, rounded shoulders)  Sitting - Static: Good  Sitting - Dynamic: Good  Exercises  Comments: Pt completed 2 sets of 10 reps of BLE ankle pumps and quad sets with tactile cues for quadriceps muscle activation   Therapeutic Exercise:  Cues were given for technique, safety, recruitment, and rationale. Cues were verbal and/or tactile. Therapeutic Activity Training:   Therapeutic activity training was instructed today. Cues were given for safety, sequence, UE/LE placement, awareness, and balance. Activities performed today included bed mobility training, sup-sit, sit-stand. Plan   Plan  Times per week: 3+  Current Treatment Recommendations: Strengthening, ROM, Balance Training, Functional Mobility Training, Transfer Training, Endurance Training, ADL/Self-care Training, Wheelchair Mobility Training, Neuromuscular Re-education, Patient/Caregiver Education & Training, Pain Management, Equipment Evaluation, Education, & procurement, Home Exercise Program, Positioning, IADL Training, Gait Training, Stair training, Safety Education & Training  Safety Devices  Type of devices: All fall risk precautions in place, Patient at risk for falls, Bed alarm in place, Left in bed, Call light within reach, Gait belt, Nurse notified      AM-PAC Score  AM-PAC Inpatient Mobility Raw Score : 14 (04/06/20 1937)  AM-PAC Inpatient T-Scale Score : 38.1 (04/06/20 1937)  Mobility Inpatient CMS 0-100% Score: 61.29 (04/06/20 1937)  Mobility Inpatient CMS G-Code Modifier : CL (04/06/20 1937)          Goals  Long term goals  Long term goal 1: In one week, pt will complete all bed mobility with supervision  Long term goal 2: In one week, pt will complete sit <> stand transfers with SBAx1  Long term goal 3: In one week, pt will ambulate 100 feet with SBAx1 with LRAD  Long term goal 4:  In one week, pt will independently complete 3 sets of 10 reps of BLE AROM exercises in available and allowed ROM       Time In: 1450  Time Out: 1530  Total Treatment Time: 40  Timed Code

## 2020-04-06 NOTE — PROGRESS NOTES
@TROPONINI:3@      Assessment:  64 y. o.year old who is admitted for          Plan:  1. NSVT: most likely from metabolic derangements, her Mg was low 1.4 amd sepsis induced, WILL replace magnesium  2. Sepsis: as per medicine  3. H/o cholesystitis and drainage: stable, continue antibiotics  4. HTN: stable:   5. DYSLIPIDEMIA: stable,she is awaiting liver transplant, will need GI to discuss if statins can be continued  6. Cirrhosis: as ABOVE  7. COPD:stable, continue inhalers  8. Dm:stable, continue insulin  H/O CHF;'STABLEAll labs, medications and tests reviewed, continue all other medications of all above medical condition listed as is.       Nel Escobar MD 4/6/2020 10:02 AM

## 2020-04-06 NOTE — PROGRESS NOTES
Nephrology Progress Note  4/6/2020 11:40 AM        Subjective:   Admit Date: 4/2/2020  PCP: Jackie Bills MD    Interval History: slowly improving    Diet: better    ROS:  No overt sob    Data:     Current med's:    insulin glargine  15 Units Subcutaneous Nightly    metFORMIN  500 mg Oral BID WC    torsemide  20 mg Oral Daily    vancomycin  1,500 mg Intravenous Q18H    sodium chloride flush  10 mL Intravenous 2 times per day    collagenase   Topical Daily    meropenem  1 g Intravenous Q8H    sodium chloride  1,000 mL Intravenous Once    sodium chloride flush  10 mL Intravenous 2 times per day    enoxaparin  40 mg Subcutaneous Daily    insulin lispro  0-6 Units Subcutaneous TID WC    budesonide-formoterol  2 puff Inhalation BID    exemestane  25 mg Oral Daily    atorvastatin  20 mg Oral Nightly    levothyroxine  200 mcg Oral Daily    lactulose  10 g Oral Daily    isosorbide mononitrate  30 mg Oral Nightly    insulin lispro  0-6 Units Subcutaneous 2 times per day      dextrose           I/O last 3 completed shifts:   In: 56 [P.O.:480; I.V.:10]  Out: 2400 [Urine:2350; Drains:50]    CBC:   Recent Labs     04/04/20  1502 04/05/20  0200 04/06/20  0327   WBC 13.4* 14.0* 13.6*   HGB 9.6* 9.2* 8.8*   PLT 71* 76* 84*          Recent Labs     04/04/20  1502 04/05/20  0200 04/05/20  0445 04/06/20  0327   *  --  133* 134*   K 4.9  --  4.3 4.1   CL 96*  --  96* 97*   CO2 14*  --  25 27   BUN 34*  --  32* 34*   CREATININE 1.0 1.0 0.9 1.1   GLUCOSE 184*  --  143* 191*       Lab Results   Component Value Date    CALCIUM 7.7 (L) 04/06/2020    PHOS 2.7 04/04/2020       Objective:     Vitals: BP (!) 155/45   Pulse 96   Temp 97.8 °F (36.6 °C) (Oral)   Resp 16   Ht 5' 4\" (1.626 m)   Wt 282 lb 12.8 oz (128.3 kg)   LMP 01/03/2012   SpO2 95%   BMI 48.54 kg/m²     General appearance:  No ac distress  HEENT:  Mild conj  pallor, no scleral icterus  Neck:  supple  Lungs:  No gross crackles this am    Heart: Seems RRR  Abdomen: soft- she has abd drain - ch   Extremities:  Minimal /  Edema       Problem List :         Impression :     1. Low na- slowly improving- mainly from access TBW  2. sepsis- Id seeing pt now   3. Multiple malignancies - recent TACE for [facility] [address] and DM/ obesity- so lots of risk factor  4. HTN  5. Low mg mainly from renal loss by Loop unless she  Has  GI loss- so load her up as she is on loop and will continue to loose     Recommendation/Plan  :     1. Manual BP  2. Ok with po torsemide for now   3. [place] with potential infection  4. await ID w/U and recom  5. Daily wt  6. Low salt  7. Follow clinically and bio chemically  8.  She has risk for POLLO- so watch       Remington Joshi MD

## 2020-04-06 NOTE — DISCHARGE SUMMARY
soft , generalized non specific discomfort with palp, no guarding. Bowel sounds are normoactive. Drain in place  MSK    Spontaneous movement of all extremities  SKIN    Normal coloration, warm, dry. NEURO           normal speech, no lateralizing weakness. PSYCH            Awake, alert, oriented x 4.  Affect appropriate. BMP/CBC  Recent Labs     04/04/20  1502 04/05/20  0200 04/05/20  0445 04/06/20  0327   *  --  133* 134*   K 4.9  --  4.3 4.1   CL 96*  --  96* 97*   CO2 14*  --  25 27   BUN 34*  --  32* 34*   CREATININE 1.0 1.0 0.9 1.1   WBC 13.4* 14.0*  --  13.6*   HCT 35.4* 30.2*  --  28.7*   PLT 71* 76*  --  84*       IMAGING:  Ct Abdomen Pelvis W Iv Contrast    Result Date: 4/2/2020  EXAMINATION: CT OF THE ABDOMEN AND PELVIS WITH CONTRAST 4/2/2020 3:17 pm TECHNIQUE: CT of the abdomen and pelvis was performed with the administration of intravenous contrast. Multiplanar reformatted images are provided for review. Dose modulation, iterative reconstruction, and/or weight based adjustment of the mA/kV was utilized to reduce the radiation dose to as low as reasonably achievable. COMPARISON: 06/12/2018 HISTORY: ORDERING SYSTEM PROVIDED HISTORY: 1.  skin breakdown / cellulitis buttock  2. Recent abd surgery TECHNOLOGIST PROVIDED HISTORY: Please comment on marybeth-anal & marybeth-rectal regions. Pt has skin breakdown, cellulitis in this region that extends to perianal region. Reason for exam:->1.  skin breakdown / cellulitis buttock  2. Recent abd surgery FINDINGS: Lower Chest: Bibasilar dependent atelectasis/scarring. Base of the heart is normal in size without pericardial fluid collection. Questionable nodular density versus scarring in the left lower lobe measuring 1.5 cm. Organs: Inferior right hepatic mass noted maximally measuring 10.3 x 6.7 x 11.0 cm. There is a heterogeneous collection within the gallbladder fossa with internal air and debris. A drain has been placed within the gallbladder fossa.   The also has a history of COPD for which he uses home breathing machine as well as nighttime oxygen therapy FINDINGS: Cardiac and mediastinal contours are enlarged but unchanged from previous examination. Mild pulmonary venous congestion. No evidence of focal consolidation, or significant pleural effusion. No evidence of pneumothorax. No acute osseous abnormalities. 1. Enlarged cardiomediastinal silhouette with mild pulmonary venous congestion. No significant interval change. 2. No radiographic evidence of focal consolidation, or significant pleural effusion.      Discharge Time of 41 minutes    Electronically signed by Merline Mountain, MD on 4/6/2020 at 3:15 PM

## 2020-04-06 NOTE — PROGRESS NOTES
Pulmonary and Critical Care  Progress Note      VITALS:  BP (!) 155/45   Pulse 96   Temp 97.8 °F (36.6 °C) (Oral)   Resp 16   Ht 5' 4\" (1.626 m)   Wt 282 lb 12.8 oz (128.3 kg)   LMP 01/03/2012   SpO2 95%   BMI 48.54 kg/m²     Subjective:   CHIEF COMPLAINT :cough, fever, Dyspnea and body aches     HPI:                The patient is a 64 y.o. female with significant past medical history of Morbid Obesity, Hypothyrodism, SAEID, COPD- 30 pk yr smoker, GALLEGOS Cirrhosis, recent acute cholecystitis s/p cholecystectomy with drain, Hepatocellular Ca s/p TACE await liver transplant  presents with complaints of cough, fever, SOB x few days associated with body aches. She has no sick exposure, travel to [facility], no chest pain, no palpitations, no dizziness, no n/v. No diarrhea, no dysuria, no headaches. At this time she is being evaluated for the COVID-19. She is being treated with the Abx. Objective:   PHYSICAL EXAM:    LUNGS:Decreased air entry bilateral bases  Abd-soft, BS+, NT  Ext - no pedal edema  CVS-s1s2, no murmurs      DATA:    CBC:  Recent Labs     04/04/20  1502 04/05/20  0200 04/06/20  0327   WBC 13.4* 14.0* 13.6*   RBC 3.57* 3.46* 3.25*   HGB 9.6* 9.2* 8.8*   HCT 35.4* 30.2* 28.7*   PLT 71* 76* 84*   MCV 99.2 87.3 88.3   MCH 26.9* 26.6* 27.1   MCHC 27.1* 30.5* 30.7*   RDW 19.4* 19.0* 18.8*   SEGSPCT 68.6* 68.5* 69.6*      BMP:  Recent Labs     04/04/20  1502 04/05/20  0200 04/05/20  0445 04/06/20  0327   *  --  133* 134*   K 4.9  --  4.3 4.1   CL 96*  --  96* 97*   CO2 14*  --  25 27   BUN 34*  --  32* 34*   CREATININE 1.0 1.0 0.9 1.1   CALCIUM 7.6*  --  7.7* 7.7*   GLUCOSE 184*  --  143* 191*      ABG:  No results for input(s): PH, PO2ART, PFR8EMZ, HCO3, BEART, O2SAT in the last 72 hours. BNP  Lab Results   Component Value Date    BNP 18 02/11/2014      D-Dimer:  Lab Results   Component Value Date    DDIMER 401 (H) 02/06/2018      1.  Radiology: None      Assessment/Plan     Patient Active Problem List

## 2020-04-14 PROBLEM — K75.81 NONALCOHOLIC STEATOHEPATITIS: Status: ACTIVE | Noted: 2020-01-01

## 2020-04-14 PROBLEM — D50.0 IRON DEFICIENCY ANEMIA SECONDARY TO BLOOD LOSS (CHRONIC): Status: ACTIVE | Noted: 2020-01-01

## 2020-04-14 PROBLEM — D69.59 SECONDARY THROMBOCYTOPENIA: Status: ACTIVE | Noted: 2020-01-01

## 2020-04-23 PROBLEM — A41.9 SEPSIS, UNSPECIFIED ORGANISM (HCC): Status: ACTIVE | Noted: 2020-01-01

## 2020-04-23 PROBLEM — E87.8 ELECTROLYTE DISTURBANCE: Status: ACTIVE | Noted: 2020-01-01

## 2020-04-23 PROBLEM — J96.21 ACUTE ON CHRONIC RESPIRATORY FAILURE WITH HYPOXIA (HCC): Status: ACTIVE | Noted: 2020-01-01

## 2020-04-23 PROBLEM — N17.9 ACUTE KIDNEY INJURY (HCC): Status: ACTIVE | Noted: 2020-01-01

## 2020-04-23 PROBLEM — J18.9 HCAP (HEALTHCARE-ASSOCIATED PNEUMONIA): Status: ACTIVE | Noted: 2020-01-01

## 2020-04-23 PROBLEM — J81.0 ACUTE PULMONARY EDEMA (HCC): Status: ACTIVE | Noted: 2020-01-01

## 2020-04-23 PROBLEM — Z20.822 SUSPECTED COVID-19 VIRUS INFECTION: Status: ACTIVE | Noted: 2020-01-01

## 2020-04-23 PROBLEM — I50.9 ACUTE EXACERBATION OF CHF (CONGESTIVE HEART FAILURE) (HCC): Status: ACTIVE | Noted: 2020-01-01

## 2020-04-23 NOTE — H&P
50-55%, initial trop neg, denied chest pain, CT chest shows cardiomegaly with pulmonary edema.  Acute pulmonary edema          - consult Cardiology, Dr. Coreen Morgan         - Bumex, BID         - c/w Imdur, Zocor         - daily weights, monitor I/O         - monitor BNP          Acute kidney injury, likely pre-renal - Crea 1.8 (BL 1.2), BUN 74 (BL 51), GFR 29.   Electrolyte imbalance - hyponatremia (127) but could be dilutional, Cl (96). - consult Nephrology       - hold Metformin, Aldactone for now       - strict I/O monitoring       - avoid nephrotoxic agents, adjust meds for GFR if needed       - pending urine Na, and urine Protein/Crea ratio, CK       - monitor CMP     Liver/Breast cancer        - c/w oral chemo drug: Aromasin        - she see and f/u with oncology at [facility] DM type 2 - Last A1C 6.0         - Sliding scale        - monitor accucheck        - hold Metformin for now d/t POLLO        - diabetic diet     Iron deficiency anemia - Hgb 8.0 today, c/w ferrous sulfate, monitor CBC.  Hypertension, controlled, c/w Clonidine, cont BP monitoring.  Thyroid disease - c/w synthroid, pending TSH, T4.     Morbid obesity - current BMI 46.4        - Health maintenance: exerise and diet     Current diagnosis and plan of management discussed with the patient at the time of admission in lay language who agree to the above plan and disposition of admission for further care. All concerns and questions addressed.       Patient assessment and plan in conjunction with supervising physician - Dr. Carlene Flannery sodium, Carb control, fluid restriction 1800 mL   DVT Prophylaxis [x] Lovenox, []  Heparin, [] SCDs, [] Ambulation  [] Long term AC   GI Prophylaxis [x] PPI,  [] H2 Blocker,  [] Carafate,  [] Diet,  [] No GI prophylaxis, N/A: patient is not under significant medical stress, non-ICU or is receiving a diet/tube feeds   Code Status  Full CODE   Disposition Patient requires continued admission 20.00     Pack years: 30.00     Last attempt to quit: 3/23/2005     Years since quitting: 15.0    Smokeless tobacco: Never Used   Substance and Sexual Activity    Alcohol use: No     Alcohol/week: 0.0 standard drinks    Drug use: No    Sexual activity: Yes     Partners: Male   Lifestyle    Physical activity     Days per week: None     Minutes per session: None    Stress: None   Relationships    Social connections     Talks on phone: None     Gets together: None     Attends Alevism service: None     Active member of club or organization: None     Attends meetings of clubs or organizations: None     Relationship status: None    Intimate partner violence     Fear of current or ex partner: None     Emotionally abused: None     Physically abused: None     Forced sexual activity: None   Other Topics Concern    None   Social History Narrative    None     TOBACCO:   reports that she quit smoking about 15 years ago. She has a 30.00 pack-year smoking history. She has never used smokeless tobacco.  ETOH:   reports no history of alcohol use. Drugs:  reports no history of drug use. Allergies: Allergies   Allergen Reactions    Latex     Lasix [Furosemide] Hives    Sulfa Antibiotics Rash    Nystatin Other (See Comments)     Pt reports \"burning\" sensation to skin    Tape Ny Alvaro Tape]      Medications:   Medications:    vancomycin  1,000 mg Intravenous Once    cefepime  2 g Intravenous Once    azithromycin  500 mg Intravenous Once    torsemide  20 mg Oral Once      Infusions:    sodium chloride       PRN Meds:    Prior to Admission Meds:  Prior to Admission medications    Medication Sig Start Date End Date Taking?  Authorizing Provider   alendronate (FOSAMAX) 70 MG tablet  1/3/20   Historical Provider, MD   lactulose (CHRONULAC) 10 GM/15ML solution  1/18/20   Historical Provider, MD   nystatin (MYCOSTATIN) 922836 UNIT/ML suspension Take 5 mLs by mouth 4 times daily 3/17/20   mAinta Cabral MD ferrous sulfate (SUKHI-PENNY) 325 (65 Fe) MG tablet Take 1 tablet by mouth every other day 10/28/19   Kaitlin Ac MD   metFORMIN (GLUCOPHAGE) 500 MG tablet  9/24/19   Historical Provider, MD   gabapentin (NEURONTIN) 300 MG capsule Take 300 mg by mouth 3 times daily as needed for Pain. 9/24/19   Historical Provider, MD   torsemide (DEMADEX) 20 MG tablet Take 1 tablet by mouth daily 9/9/18   Fritz Davis MD   spironolactone (ALDACTONE) 50 MG tablet Take 100 mg by mouth 2 times daily     Historical Provider, MD   ipratropium-albuterol (DUONEB) 0.5-2.5 (3) MG/3ML SOLN nebulizer solution Inhale 3 mLs into the lungs every 6 hours 6/19/17   Iva Vasquez MD   budesonide-formoterol [facility]) 160-4.5 MCG/ACT AERO Inhale 2 puffs into the lungs 2 times daily 2/20/17   Iva Vasquez MD   exemestane (AROMASIN) 25 MG chemo tablet Take 25 mg by mouth daily    Historical Provider, MD   hydrOXYzine (ATARAX) 50 MG tablet Take 100 mg by mouth nightly  10/17/15   Historical Provider, MD   zolpidem (AMBIEN) 10 MG tablet Take by mouth nightly. Maximo Semen Historical Provider, MD   CPAP Machine MISC 10 cm by Does not apply route nightly. Historical Provider, MD   OXYGEN Inhale 2 L into the lungs nightly. Historical Provider, MD   simvastatin (ZOCOR) 20 MG tablet Take 20 mg by mouth nightly. Historical Provider, MD   levothyroxine (SYNTHROID) 200 MCG tablet Take 200 mcg by mouth Daily. Historical Provider, MD   isosorbide mononitrate (IMDUR) 30 MG CR tablet Take 30 mg by mouth nightly     Historical Provider, MD   cloNIDine (CATAPRES) 0.2 MG tablet Take 0.2 mg by mouth 3 times daily.     Historical Provider, MD     Data:     Laboratory this visit:  Reviewed  Recent Labs     04/23/20  1224   WBC 12.4*   HGB 8.0*   HCT 25.6*   PLT 83*      Recent Labs     04/23/20  1224   *   K 5.0   CL 96*   CO2 17*   BUN 74*   CREATININE 1.8*     Recent Labs     04/23/20  1224   AST 45*   ALT 33   BILITOT 2.6*   ALKPHOS 142*     No results for input(s): INR in the last 72 hours. No results for input(s): CKTOTAL, CKMB, CKMBINDEX in the last 72 hours. Invalid input(s): Jamil Jose input(s): PRO-BNP    Radiology this visit:  Reviewed. Ct Abdomen Pelvis W Iv Contrast    Result Date: 4/2/2020  EXAMINATION: CT OF THE ABDOMEN AND PELVIS WITH CONTRAST 4/2/2020 3:17 pm TECHNIQUE: CT of the abdomen and pelvis was performed with the administration of intravenous contrast. Multiplanar reformatted images are provided for review. Dose modulation, iterative reconstruction, and/or weight based adjustment of the mA/kV was utilized to reduce the radiation dose to as low as reasonably achievable. COMPARISON: 06/12/2018 HISTORY: ORDERING SYSTEM PROVIDED HISTORY: 1.  skin breakdown / cellulitis buttock  2. Recent abd surgery TECHNOLOGIST PROVIDED HISTORY: Please comment on marybeth-anal & marybeth-rectal regions. Pt has skin breakdown, cellulitis in this region that extends to perianal region. Reason for exam:->1.  skin breakdown / cellulitis buttock  2. Recent abd surgery FINDINGS: Lower Chest: Bibasilar dependent atelectasis/scarring. Base of the heart is normal in size without pericardial fluid collection. Questionable nodular density versus scarring in the left lower lobe measuring 1.5 cm. Organs: Inferior right hepatic mass noted maximally measuring 10.3 x 6.7 x 11.0 cm. There is a heterogeneous collection within the gallbladder fossa with internal air and debris. A drain has been placed within the gallbladder fossa. The pancreas is unremarkable. The spleen is enlarged measuring 17 cm. GI/Bowel: No bowel obstruction or inflammation. No free intraperitoneal air or fluid. Small bowel loops are normal in caliber. No evidence for hiatal hernia. Pelvis: No pelvic free fluid. Peritoneum/Retroperitoneum: Bilateral hypodense adrenal mass lesions, previously described as adenomas. Kidneys perfuse symmetrically. Ureters are unobstructed.   Urinary bladder is unremarkable. Bones/Soft Tissues: No acute osseous or soft tissue abnormality identified. Specifically, no definite subcutaneous air or fluid collection within the included perianal soft tissues. Multilevel degenerative disease of the visualized thoracolumbar spine. 11 cm mass lesion within the inferior right hepatic lobe, most compatible with known history of hepatocellular carcinoma. Heterogeneous collection with air, fluid, and debris within the gallbladder fossa. A percutaneous drain is in place with its tip at the base of this collection. No free intraperitoneal air or fluid. Bilateral adrenal nodules, stable from multiple previous studies and most compatible with adenomas. No definite subcutaneous air or fluid collection within the visualized perianal soft tissues. Nodular density in the left lower lobe is most compatible with pleural thickening/scarring. Xr Chest Portable    Result Date: 4/23/2020  EXAMINATION: ONE X-RAY VIEW OF THE CHEST 4/23/2020 12:11 pm COMPARISON: 04/02/2020 HISTORY: ORDERING SYSTEM PROVIDED HISTORY: Cough SOB TECHNOLOGIST PROVIDED HISTORY: Reason for exam:->Cough SOB Reason for Exam: Cough SOB FINDINGS: The heart appears enlarged but is similar in size to the prior study. No definite pleural effusion or pneumothorax. There are interstitial opacities bilaterally with prominence of the pulmonary vasculature. Cardiomegaly with prominence of the pulmonary vasculature and interstitial opacities bilaterally most likely representing pulmonary edema. Xr Chest Portable    Result Date: 4/2/2020  EXAMINATION: ONE XRAY VIEW OF THE CHEST 4/2/2020 1:15 pm COMPARISON: March 15, 2020.  HISTORY: ORDERING SYSTEM PROVIDED HISTORY: chest pain TECHNOLOGIST PROVIDED HISTORY: Reason for exam:->chest pain Reason for Exam: chest pain Acuity: Acute Type of Exam: Initial Additional signs and symptoms: female who presents with general malaise, chills, subjective fever, shortness of breath and sore throat. Onset yesterday. Context is patient notes the onset of the above symptoms since yesterday without known sick contacts. She has occasional cough which is nonproductive. Patient also has a history of COPD for which he uses home breathing machine as well as nighttime oxygen therapy FINDINGS: Cardiac and mediastinal contours are enlarged but unchanged from previous examination. Mild pulmonary venous congestion. No evidence of focal consolidation, or significant pleural effusion. No evidence of pneumothorax. No acute osseous abnormalities. 1. Enlarged cardiomediastinal silhouette with mild pulmonary venous congestion. No significant interval change. 2. No radiographic evidence of focal consolidation, or significant pleural effusion.      EKG this visit:   EKG: Normal sinus rhythm, rate 96   Normal ECG   When compared with ECG of 02-APR-2020 13:01,   Sinus rhythm has replaced Atrial fibrillation     Current Treatment Team:  Treatment Team: Attending Provider: Belinda Carballo MD; Physician Assistant: IRLANDA Marion APRN-BC [facility]  4/23/2020 4:27 PM      Electronically signed by SHEREE Ritchie CNP on 4/23/2020 at 4:27 PM

## 2020-04-23 NOTE — ED PROVIDER NOTES
had stones off and on for past 5 yrs follow with Dr Eric Mcgill Hypertension     Liver cirrhosis ([facility] [address]     for liver bx 7/10/2017    Morbid obesity ([facility] [address] 10/20/2015    PT TOO LARGE FOR MRI LEFT SHOULDER    On home oxygen therapy     oxygen at 4l/nc at hs    PONV (postoperative nausea and vomiting)     \"did get sick with the breast surgery\"    Sleep apnea     \"had sleep study several yrs ago- c-pap  does use it\"    Thyroid disease     WD-Local infection of skin and subcutaneous tissue 12/8/2017    WD-Ulcer of abdomen wall, limited to breakdown of skin ([facility] [address]     WD-Unspecified open wound of abdominal wall, right lower quadrant without penetration into peritoneal cavity, initial encounter      Past Surgical History:   Procedure Laterality Date    ABDOMEN SURGERY N/A     BREAST SURGERY Left 2015    EXCISION MASS\"lumpectomy- took out 3 lymph nodes all ok\"    CARDIAC CATHETERIZATION  01/21/2020    no intervention    CYSTOSCOPY Left 12/23/13    stent placement    LAPAROSCOPY N/A 3/2/2020    ATTEMPTED CHOLECYSTECTOMY LAPAROSCOPIC WITH IOC ABORTED BECAUSE OF LIVER CIRRHOSIS, CHOLECYSTOSTOMY TUBE PLACED, DRAIN PLACED performed by Ronnie Guerin MD at [address]  07/10/2017    LIVER SURGERY      TACE procedure in Aug 2019   Doctor [address] [address]    \"took most of the thyroid out\"    UPPER GASTROINTESTINAL ENDOSCOPY         CURRENT MEDICATIONS    Current Outpatient Rx   Medication Sig Dispense Refill    alendronate (FOSAMAX) 70 MG tablet       lactulose (CHRONULAC) 10 GM/15ML solution       nystatin (MYCOSTATIN) 680013 UNIT/ML suspension Take 5 mLs by mouth 4 times daily 140 mL 0    ferrous sulfate (SUKHI-PENNY) 325 (65 Fe) MG tablet Take 1 tablet by mouth every other day 60 tablet 0    metFORMIN (GLUCOPHAGE) 500 MG tablet   3    gabapentin (NEURONTIN) 300 MG capsule Take 300 mg by mouth 3 times daily as needed for Pain.   3    torsemide (DEMADEX) 20 MG tablet Take 1 tablet by mouth daily 90 inappropriate. If there are any questions or concerns please feel free to contact the dictating provider for clarification.                             Arlene Vasquez PA-C  04/23/20 8310

## 2020-04-24 NOTE — PLAN OF CARE
Problem: Falls - Risk of:  Goal: Will remain free from falls  Description: Will remain free from falls  4/24/2020 0942 by Norman Hodo RN  Outcome: Ongoing  4/24/2020 0322 by Tami Ornelas RN  Outcome: Ongoing  Goal: Absence of physical injury  Description: Absence of physical injury  4/24/2020 0942 by Norman Hood RN  Outcome: Ongoing  4/24/2020 0322 by Tami Ornelas RN  Outcome: Ongoing     Problem: Skin Integrity:  Goal: Will show no infection signs and symptoms  Description: Will show no infection signs and symptoms  4/24/2020 0942 by Norman Hood RN  Outcome: Ongoing  4/24/2020 0322 by Tami Ornelas RN  Outcome: Ongoing  Goal: Absence of new skin breakdown  Description: Absence of new skin breakdown  4/24/2020 0942 by Norman Hood RN  Outcome: Ongoing  4/24/2020 0322 by Tami Ornelas RN  Outcome: Ongoing     Problem: Gas Exchange - Impaired:  Goal: Levels of oxygenation will improve  Description: Levels of oxygenation will improve  4/24/2020 0942 by Norman Hood RN  Outcome: Ongoing  4/24/2020 0322 by Tami Ornelas RN  Outcome: Ongoing

## 2020-04-24 NOTE — PLAN OF CARE
Problem: Falls - Risk of:  Goal: Will remain free from falls  Description: Will remain free from falls  4/24/2020 0322 by Kori Blevins RN  Outcome: Ongoing  4/23/2020 1835 by Twin Louis. Boni Molina RN  Outcome: Ongoing  Goal: Absence of physical injury  Description: Absence of physical injury  4/24/2020 0322 by Kori Blevins RN  Outcome: Ongoing  4/23/2020 1835 by Twin Louis. Boni Molina RN  Outcome: Ongoing     Problem: Skin Integrity:  Goal: Will show no infection signs and symptoms  Description: Will show no infection signs and symptoms  4/24/2020 0322 by Kori Blevins RN  Outcome: Ongoing  4/23/2020 1835 by Twin Louis. Boni Molina RN  Outcome: Ongoing  Goal: Absence of new skin breakdown  Description: Absence of new skin breakdown  4/24/2020 0322 by Kori Blevins RN  Outcome: Ongoing  4/23/2020 1835 by Twin Louis. Boni Molina RN  Outcome: Ongoing     Problem: Gas Exchange - Impaired:  Goal: Levels of oxygenation will improve  Description: Levels of oxygenation will improve  4/24/2020 0322 by Kori Blevins RN  Outcome: Ongoing  4/23/2020 1835 by Twin Louis.  Boni Molina RN  Outcome: Ongoing

## 2020-04-24 NOTE — PROGRESS NOTES
Juany Lin is a 64 y.o. female patient off [facility] but does not feel well    Current Facility-Administered Medications   Medication Dose Route Frequency Provider Last Rate Last Dose    cefepime (MAXIPIME) 2 g IVPB minibag  2 g Intravenous Q12H SHEREE Toney -  mL/hr at 04/24/20 0657 2 g at 04/24/20 0657    0.9 % sodium chloride infusion 250 mL  250 mL Intravenous Once SHEREE Marquez CNP        ferrous sulfate (IRON 325) tablet 325 mg  325 mg Oral Every Other Day Jo Hanna APRN - CNP   325 mg at 04/23/20 2225    gabapentin (NEURONTIN) capsule 300 mg  300 mg Oral TID PRN SHEREE Toney - CNP        hydrOXYzine (ATARAX) tablet 100 mg  100 mg Oral Nightly SHEREE Toney - CNP        isosorbide mononitrate (IMDUR) extended release tablet 30 mg  30 mg Oral Nightly SHEREE Toney - CNP   30 mg at 04/23/20 2226    levothyroxine (SYNTHROID) tablet 200 mcg  200 mcg Oral Daily Jo Hanna, APRN - CNP   200 mcg at 04/24/20 0527    simvastatin (ZOCOR) tablet 20 mg  20 mg Oral Nightly Jo Hanna, APRN - CNP   20 mg at 04/23/20 2226    zolpidem (AMBIEN) tablet 10 mg  10 mg Oral Nightly Jo Hanna, APRN - CNP   10 mg at 04/23/20 2225    sodium chloride flush 0.9 % injection 10 mL  10 mL Intravenous 2 times per day SHEREE Toney - CNP   10 mL at 04/23/20 2225    sodium chloride flush 0.9 % injection 10 mL  10 mL Intravenous PRN Abbie Martínez APRN - CNP        polyethylene glycol (GLYCOLAX) packet 17 g  17 g Oral Daily PRN SHEREE Toney - CNP        promethazine (PHENERGAN) tablet 12.5 mg  12.5 mg Oral Q6H PRN SHEREE Toney - CNP        Or    ondansetron (ZOFRAN) injection 4 mg  4 mg Intravenous Q6H PRN SHEREE Choudhary - CNP        enoxaparin (LOVENOX) injection 40 mg  40 mg Subcutaneous Daily SHEREE Choudhary CNP   40 mg at 04/23/20 2224    bumetanide (BUMEX) injection 1 mg  1 mg Intravenous BID SHEREE Toney - CNP

## 2020-04-24 NOTE — CONSULTS
regular rate and rhythm, normal S1 and S2, no S3 or S4, and has no  murmur   ABDOMEN:  No scars, normal bowel sounds, soft, non-distended, non-tender, no masses palpated, no hepatolienomegaly has gallbladder drainage  Musculoskeletal: Normal  Extremities: Normal, peripheral pulses normal, , has no edema   NEUROLOGIC:  Awake, alert, oriented to name, place and time. Cranial nerves II-XII are grossly intact. Motor is  intact. Sensory neuropathy. ,  and gait is abnormal.    DATA:    CBC:   Recent Labs     04/23/20  1224   WBC 12.4*   HGB 8.0*   PLT 83*    CMP:  Recent Labs     04/23/20  1224   *   K 5.0   CL 96*   CO2 17*   BUN 74*   CREATININE 1.8*   CALCIUM 8.4   PROT 7.4   LABALBU 2.5*   BILITOT 2.6*   ALKPHOS 142*   AST 45*   ALT 33     Lipids:   Lab Results   Component Value Date    CHOL 123 10/27/2019    HDL 54 10/27/2019    TRIG 69 10/27/2019     Glucose:   Recent Labs     04/23/20  1930   POCGLU 125*     Hemoglobin A1C:   Lab Results   Component Value Date    LABA1C 6.0 04/02/2020     Free T4:   Lab Results   Component Value Date    T4FREE 1.28 04/23/2020     Free T3: No results found for: FT3  TSH High Sensitivity:   Lab Results   Component Value Date    TSHHS 0.182 04/23/2020       Xr Chest Portable    Result Date: 4/23/2020  EXAMINATION: ONE X-RAY VIEW OF THE CHEST 4/23/2020 12:11 pm COMPARISON: 04/02/2020 HISTORY: ORDERING SYSTEM PROVIDED HISTORY: Cough SOB TECHNOLOGIST PROVIDED HISTORY: Reason for exam:->Cough SOB Reason for Exam: Cough SOB FINDINGS: The heart appears enlarged but is similar in size to the prior study. No definite pleural effusion or pneumothorax. There are interstitial opacities bilaterally with prominence of the pulmonary vasculature. Cardiomegaly with prominence of the pulmonary vasculature and interstitial opacities bilaterally most likely representing pulmonary edema.        Scheduled Medicines   Medications:    cloNIDine  0.2 mg Oral TID    ferrous sulfate  325 mg Unresponsive episode    E coli bacteremia    [facility] ([facility] [address]    Cellulitis of buttock    Iron deficiency anemia secondary to blood loss (chronic)    Nonalcoholic steatohepatitis    Secondary thrombocytopenia    Sepsis, unspecified organism ([facility] [address]    HCAP (healthcare-associated pneumonia)    Acute exacerbation of CHF (congestive heart failure) (HCC)    Acute pulmonary edema (HCC)    Acute kidney injury ([facility] [address]    Electrolyte disturbance    Acute on chronic respiratory failure with hypoxia (HCC)    Suspected COVID-19 virus infection         RECOMMENDATIONS:      1. Reviewed POC blood glucose . Labs and X ray results   2. Reviewed Home and Current Medicines   3. Will Start On Correction bolus Humalognsulin regime   4. Monitor Blood glucose frequently   5. Modify  the dose of Insulin  as needed        Will follow with you  Again thank you for sharing pt's care with me.      Truly yours,       Claribel Rinaldi MD

## 2020-04-24 NOTE — CONSULTS
Or  acetaminophen (TYLENOL) suppository 650 mg, Q6H PRN  pantoprazole (PROTONIX) tablet 40 mg, QAM AC  insulin lispro (HUMALOG) injection vial 0-12 Units, TID WC  insulin lispro (HUMALOG) injection vial 0-6 Units, Nightly  glucose (GLUTOSE) 40 % oral gel 15 g, PRN  dextrose 50 % IV solution, PRN  glucagon (rDNA) injection 1 mg, PRN  dextrose 5 % solution, PRN      Current Facility-Administered Medications   Medication Dose Route Frequency Provider Last Rate Last Dose    albuterol sulfate  (90 Base) MCG/ACT inhaler 2 puff  2 puff Inhalation 4x daily Sadia De Leon MD        ipratropium (ATROVENT HFA) 17 MCG/ACT inhaler 2 puff  2 puff Inhalation 4x daily Sadia De Leon MD        meropenem (MERREM) 1 g in sodium chloride 0.9 % 100 mL IVPB (mini-bag)  1 g Intravenous Q8H Arlene Bonilla MD        ferrous sulfate (IRON 325) tablet 325 mg  325 mg Oral Every Other Day Bipin Fuel, APRN - CNP   325 mg at 04/23/20 2225    gabapentin (NEURONTIN) capsule 300 mg  300 mg Oral TID PRN Bipin Fuel, APRN - CNP        hydrOXYzine (ATARAX) tablet 100 mg  100 mg Oral Nightly Abbie Martínez, APRN - CNP        isosorbide mononitrate (IMDUR) extended release tablet 30 mg  30 mg Oral Nightly Abbie Martínez, APRN - CNP   30 mg at 04/23/20 2226    levothyroxine (SYNTHROID) tablet 200 mcg  200 mcg Oral Daily Bipin Fuel, APRN - CNP   200 mcg at 04/24/20 0527    simvastatin (ZOCOR) tablet 20 mg  20 mg Oral Nightly Bipin Fuel, APRN - CNP   20 mg at 04/23/20 2226    zolpidem (AMBIEN) tablet 10 mg  10 mg Oral Nightly Bipin Fuel, APRN - CNP   10 mg at 04/23/20 2225    sodium chloride flush 0.9 % injection 10 mL  10 mL Intravenous 2 times per day Bipin Fuel, APRN - CNP   10 mL at 04/24/20 0935    sodium chloride flush 0.9 % injection 10 mL  10 mL Intravenous PRN Abbie Martínez, APRN - CNP        polyethylene glycol (GLYCOLAX) packet 17 g  17 g Oral Daily PRN Bipin Fuel, APRN - CNP infeetion  · Genitourinary: No dysuria, trouble voiding, or hematuria  · Musculoskeletal:  No gait disturbance, weakness or joint complaints  · Integumentary: No rash or pruritis  · Neurological: No TIA or stroke symptoms  · Psychiatric: No anxiety or depression  · Endocrine: No malaise, fatigue or temperature intolerance  · Hematologic/Lymphatic: No bleeding problems, blood clots or swollen lymph nodes  · Allergic/Immunologic: No nasal congestion or hives  All systems negative except as marked. Physical Examination:    Vitals:    04/24/20 0915 04/24/20 0921 04/24/20 1017 04/24/20 1041   BP: 108/61  (!) 112/38 (!) 104/38   Pulse: 93 104     Resp: 25      Temp: 98.9 °F (37.2 °C)  97.8 °F (36.6 °C) 97.8 °F (36.6 °C)   TempSrc: Oral  Oral Oral   SpO2: 98%      Weight: 294 lb 8.6 oz (133.6 kg)      Height: 5' 4\" (1.626 m)          General Appearance:  No distress, conversant, left middle neck central line in place with a hematoma active oozing of blood    Constitutional:  Well developed, Well nourished, No acute distress, Non-toxic appearance. HENT:  Normocephalic, Atraumatic, Bilateral external ears normal, Oropharynx moist, No oral exudates, Nose normal. Neck- Normal range of motion, No tenderness, Supple, No stridor,no apical-carotid delay  Lymphatics : no palpable lymph nodes  Eyes:  PERRL, EOMI, Conjunctiva normal, No discharge. Respiratory:  Normal breath sounds, No respiratory distress, No wheezing, No chest tenderness. ,no use of accessory muscles, crackles Present  Cardiovascular: (PMI) apex non displaced,no lifts no thrills, ankle swelling Present , 1+, s1 and s2 audible,Murmur. Absent , JVD not noted    Abdomen /GI:  Bowel sounds normal, Soft, No tenderness, No masses, No gross visceromegaly   :  No costovertebral angle tenderness   Musculoskeletal:  No edema, no tenderness, no deformities.  Back- no tenderness  Integument:  Well hydrated, no rash   Lymphatic:  No lymphadenopathy noted   Neurologic: unremarkable configuration. The mediastinal contours are within normal limits. The lungs are well aerated. The pleural surfaces are normal and no evidence of a pleural effusion is seen. Bones and soft tissues are unremarkable. 1. Moderate cardiomegaly. 2. Otherwise, unremarkable single AP upright portable view of the chest.     Xr Chest Portable    Result Date: 4/23/2020  EXAMINATION: ONE X-RAY VIEW OF THE CHEST 4/23/2020 12:11 pm COMPARISON: 04/02/2020 HISTORY: ORDERING SYSTEM PROVIDED HISTORY: Cough SOB TECHNOLOGIST PROVIDED HISTORY: Reason for exam:->Cough SOB Reason for Exam: Cough SOB FINDINGS: The heart appears enlarged but is similar in size to the prior study. No definite pleural effusion or pneumothorax. There are interstitial opacities bilaterally with prominence of the pulmonary vasculature. Cardiomegaly with prominence of the pulmonary vasculature and interstitial opacities bilaterally most likely representing pulmonary edema. Xr Chest Portable    Result Date: 4/2/2020  EXAMINATION: ONE XRAY VIEW OF THE CHEST 4/2/2020 1:15 pm COMPARISON: March 15, 2020. HISTORY: ORDERING SYSTEM PROVIDED HISTORY: chest pain TECHNOLOGIST PROVIDED HISTORY: Reason for exam:->chest pain Reason for Exam: chest pain Acuity: Acute Type of Exam: Initial Additional signs and symptoms: female who presents with general malaise, chills, subjective fever, shortness of breath and sore throat. Onset yesterday. Context is patient notes the onset of the above symptoms since yesterday without known sick contacts. She has occasional cough which is nonproductive. Patient also has a history of COPD for which he uses home breathing machine as well as nighttime oxygen therapy FINDINGS: Cardiac and mediastinal contours are enlarged but unchanged from previous examination. Mild pulmonary venous congestion. No evidence of focal consolidation, or significant pleural effusion. No evidence of pneumothorax.  No acute osseous

## 2020-04-24 NOTE — CONSULTS
[address] [place] [address], [facility]                                  CONSULTATION    PATIENT NAME: Shakila Kang                   :        1960  MED REC NO:   [id number]                          ROOM:       2005  ACCOUNT NO:   [de-identified]                           ADMIT DATE: 2020  PROVIDER:     Dipti Warren MD    CONSULT DATE:  2020    CONSULT REQUESTED BY:  Bibi Hawk MD    REASON FOR CONSULT:  Acute kidney injury as well as hyponatremia. BRIEF HISTORY:  The patient is an unfortunate 79-year-old obese white  female with multiple medical conditions, which include but not limited  to several malignancies, most notably hepatocellular carcinoma, who was  brought to the emergency room yesterday with increasing shortness of  breath, not feeling very well at home. In the emergency room, she was  hemodynamically stable, although later on, on the floor, a couple of  blood pressures were little bit on the lower side and she underwent  several diagnostic tests, which include imaging and biochemical.   Imaging study, which includes chest x-ray, revealed evidence of  pulmonary edema. Biochemical testing showed increased BUN and  creatinine, anemia, and leukocytosis as well as low platelet count. Her  proBNP level was also high, more than 5600. She was given IV fluids. Culture was done appropriately and empirical broad-spectrum antibiotic  was initiated along with checking for SARS-CoV-2 virus and she was sent  to the [facility] unit, formally known as CVICU. She is producing some urine. Creatinine did go down to 1.6. We are  still waiting for the test, and she has a little bit of bleeding from  her left CVC placement area. She is otherwise alert and awake. She is  hemodynamically stable at this time. I am familiar with her.   I saw her in beginning of 2020 when she was  admitted with pretty much similar chemoembolization, at [facility] and  had a cholecystostomy and a DEMETRIO drain, which was removed lately, but she  still has the cholecystostomy tube. 5.  Obstructive sleep apnea. She is on CPAP machine at home, currently  she is on BiPAP. 6.  Osteoporosis. 7.  Hypertension. 8.  Hypothyroidism. HABITS:  She does not smoke. No history of alcohol or illicit drug  abuse. SOCIAL HISTORY:  The patient is , lives with [place]  [place]  [place]. I am actually very familiar with her , I see him as my  patient. She is unemployed, did not have great activities of daily  living as she is in and out of the hospital lately. ALLERGIES:  She is listed allergic to LASIX, SULFA, TAPE, and NYSTATIN. I do not think any of them has allergic reaction though because I gave  her Lasix last time and she did fine. CURRENT MEDICATIONS:  Here in the hospital include she is on  azithromycin, Bumex 1 mg twice a day, cefepime, clonidine 0.2 three  times a day, Lovenox, ferrous sulfate as well as lactulose, Protonix,  simvastatin, and Thrombin Kit was added. REVIEW OF SYSTEMS:  Mainly shortness of breath. She has some bleeding  from the left CVC cath area. No fever, fatigue, or leg edema. Rest of  the review of systems is negative or as in previous paragraph. PHYSICAL EXAMINATION:  VITAL SIGNS:  During my examination, her T-max was 99.2, blood pressure  barely 100/50, so we might have to hold all antihypertensive  medications, especially clonidine, pulse is 107, although she was going  through cleaning of the CVC bleeding area, respiratory rate 25,  saturating 100% on BiPAP therapy. HEAD AND NECK:  Normocephalic, no trauma, but she has bleeding from the  left IJ CVC catheter. EYES:  Positive conjunctival pallor. EARS, MOUTH, AND THROAT:  Unable to examine. CARDIOVASCULAR:  Tachycardia. RESPIRATORY:  Very limited exam.  Few adventitious breath sounds. ABDOMEN:  Soft.   There is a cholecystostomy tube, which is unlikely. 2.  Look for other sources of infection, adjust antibiotic as  sensitivity comes back, supportive care, and follow clinically.         Ricky Goff MD    D: 04/24/2020 7:23:56       T: 04/24/2020 10:38:52     [email or web address]  Job#: [id number]     Doc#: [id number]    CC:

## 2020-04-24 NOTE — PROGRESS NOTES
Alyce alegria via perfect serve: \"earlier this shift, took out peripheral IV bc occluded, bled A LOT. like soaked the bed and her gown and had to hold pressure 20 min. so andom did place a CVC earlier. we did the clotting powder. it is still bleeding through that and through the dressing. do you want full labs? pt/inr/cbc/apt?? also, what do you want us to do about it?  Thanks\"

## 2020-04-24 NOTE — PROGRESS NOTES
[facility]  consulted by Dr. Marco Antoino Hester for monitoring and adjustment. Indication for treatment: Pneumonia  Goal trough: 15 mcg/mL     Pertinent Laboratory Values:   Temp Readings from Last 3 Encounters:   04/24/20 98.9 °F (37.2 °C) (Oral)   04/06/20 96.8 °F (36 °C) (Oral)   03/17/20 98.5 °F (36.9 °C) (Oral)     Recent Labs     04/23/20  1224 04/23/20  1604 04/24/20  0535 04/24/20  0602   WBC 12.4*  --  10.0 11.1*   LACTATE 6.0* 3.7*  --   --      Recent Labs     04/23/20  1224 04/24/20  0535   BUN 74* 77*   CREATININE 1.8* 1.6*     Estimated Creatinine Clearance: 52 mL/min (A) (based on SCr of 1.6 mg/dL (H)). Intake/Output Summary (Last 24 hours) at 4/24/2020 1014  Last data filed at 4/24/2020 8086  Gross per 24 hour   Intake --   Output 1775 ml   Net -1775 ml       Pertinent Cultures:  Date    Source    Results  4/24   Legionella/ strep pneumo ag Ordered  4/24   RESP panel   Negative   4/24   Urine    Ordered  4/24   Blood    E coli (2 of 2)    Vancomycin level:   TROUGH:    No results for input(s): VANCOTROUGH in the last 72 hours. RANDOM:  No results for input(s): VANCORANDOM in the last 72 hours. Assessment:  · WBC and temperature: WBC elevated; Afebrile  · SCr, BUN, and urine output: POLLO (baseline Scr ~ 1.0)  · Day(s) of therapy: #1  · Vancomycin level: to be collected    Plan:  · Per [facility] documentation looks like patient received a dose of vancomycin 1750 mg x 1 around 2 am   · Will proceed with intermittent dosing based on levels   · Random level tonight   · Pharmacy will continue to monitor patient and adjust therapy as indicated    Thank you for the consult.   [facility], [address]  4/24/2020 10:21 AM

## 2020-04-24 NOTE — CONSULTS
spironolactone (ALDACTONE) 50 MG tablet Take 100 mg by mouth 2 times daily       ipratropium-albuterol (DUONEB) 0.5-2.5 (3) MG/3ML SOLN nebulizer solution Inhale 3 mLs into the lungs every 6 hours 120 vial 5    exemestane (AROMASIN) 25 MG chemo tablet Take 25 mg by mouth daily      hydrOXYzine (ATARAX) 50 MG tablet Take 100 mg by mouth nightly   2    zolpidem (AMBIEN) 10 MG tablet Take by mouth nightly. .      CPAP Machine MISC 10 cm by Does not apply route nightly.  OXYGEN Inhale 2 L into the lungs nightly.  simvastatin (ZOCOR) 20 MG tablet Take 20 mg by mouth nightly.  levothyroxine (SYNTHROID) 200 MCG tablet Take 200 mcg by mouth Daily.  isosorbide mononitrate (IMDUR) 30 MG CR tablet Take 30 mg by mouth nightly       cloNIDine (CATAPRES) 0.2 MG tablet Take 0.2 mg by mouth 3 times daily.  alendronate (FOSAMAX) 70 MG tablet       lactulose (CHRONULAC) 10 GM/15ML solution       nystatin (MYCOSTATIN) 897203 UNIT/ML suspension Take 5 mLs by mouth 4 times daily 140 mL 0    gabapentin (NEURONTIN) 300 MG capsule Take 300 mg by mouth 3 times daily as needed for Pain.   3    budesonide-formoterol (SYMBICORT) 160-4.5 MCG/ACT AERO Inhale 2 puffs into the lungs 2 times daily 1 Inhaler 5         Objective:      BP (!) 106/33   Pulse 103   Temp 97.8 °F (36.6 °C) (Oral)   Resp 29   Ht 5' 4\" (1.626 m)   Wt 294 lb 8.6 oz (133.6 kg)   LMP 01/03/2012   SpO2 98%   BMI 50.56 kg/m²   Antoine Risk Score: Antoine Scale Score: 13    LABS    CBC:   Lab Results   Component Value Date    WBC 11.1 04/24/2020    RBC 2.58 04/24/2020    RBC 3.97 08/04/2017    HGB 7.0 04/24/2020    HCT 22.4 04/24/2020    MCV 86.8 04/24/2020    MCH 27.1 04/24/2020    MCHC 31.3 04/24/2020    RDW 19.0 04/24/2020    PLT 55 04/24/2020    MPV 11.6 04/06/2020     CMP:    Lab Results   Component Value Date     04/24/2020    K 4.9 04/24/2020    CL 98 04/24/2020    CO2 23 04/24/2020    BUN 77 04/24/2020    CREATININE 1.6 Acute exacerbation of CHF (congestive heart failure) (HCC)    Acute pulmonary edema (HCC)    Acute kidney injury ([facility] [address]    Electrolyte disturbance    Acute on chronic respiratory failure with hypoxia (HCC)    Suspected COVID-19 virus infection       Measurements:  Wound 04/11/17 MID ABDOMEN PANNUS ( WOUND # 6 ONSET DATE 1 WEEK)  DM NEELY 2 (Active)   Number of days: 1109       Wound (Active)   Number of days:        Wound 04/03/20 Buttocks Right (Active)   Wound Pressure Stage  2 4/24/2020 11:34 AM   Dressing Status Changed 4/24/2020 11:34 AM   Dressing Changed Changed/New 4/24/2020 11:34 AM   Wound Cleansed Rinsed/Irrigated with saline 4/24/2020 11:34 AM   Dressing Change Due 04/26/20 4/24/2020  9:21 AM   Wound Length (cm) 1.5 cm 4/24/2020 11:34 AM   Wound Width (cm) 1.5 cm 4/24/2020 11:34 AM   Wound Depth (cm) 0.1 cm 4/24/2020 11:34 AM   Wound Surface Area (cm^2) 2.25 cm^2 4/24/2020 11:34 AM   Change in Wound Size % (l*w) 40 4/24/2020 11:34 AM   Wound Volume (cm^3) 0.22 cm^3 4/24/2020 11:34 AM   Wound Healing % 42 4/24/2020 11:34 AM   Distance Tunneling (cm) 0 cm 4/24/2020 11:34 AM   Tunneling Position ___ O'Clock 0 4/24/2020 11:34 AM   Undermining Starts ___ O'Clock 0 4/24/2020 11:34 AM   Undermining Ends___ O'Clock 0 4/24/2020 11:34 AM   Undermining Maxium Distance (cm) 0 4/24/2020 11:34 AM   Wound Assessment Red;Yellow 4/24/2020 11:34 AM   Drainage Amount Moderate 4/24/2020 11:34 AM   Drainage Description Serosanguinous 4/24/2020 11:34 AM   Odor None 4/24/2020 11:34 AM   Margins Defined edges 4/24/2020 11:34 AM   Viola-wound Assessment Red;Pink 4/23/2020  8:33 PM   Non-staged Wound Description Full thickness 4/24/2020 11:34 AM   Sauget%Wound Bed 0 4/24/2020 11:34 AM   Red%Wound Bed 50 4/24/2020 11:34 AM   Yellow%Wound Bed 50 4/24/2020 11:34 AM   Black%Wound Bed 0 4/24/2020 11:34 AM   Purple%Wound Bed 0 4/24/2020 11:34 AM   Other%Wound Bed 0 4/24/2020 11:34 AM   Number of days: 21       Wound 04/03/20 Buttocks Distance Tunneling (cm) 0 cm 4/24/2020 11:34 AM   Tunneling Position ___ O'Clock 0 4/24/2020 11:34 AM   Undermining Starts ___ O'Clock 0 4/24/2020 11:34 AM   Undermining Ends___ O'Clock 0 4/24/2020 11:34 AM   Undermining Maxium Distance (cm) 0 4/24/2020 11:34 AM   Wound Assessment Yellow 4/24/2020 11:34 AM   Drainage Amount Moderate 4/24/2020 11:34 AM   Drainage Description Serosanguinous 4/24/2020 11:34 AM   Odor None 4/24/2020 11:34 AM   Margins Defined edges 4/24/2020 11:34 AM   Viola-wound Assessment Red 4/23/2020  8:33 PM   Non-staged Wound Description Full thickness 4/24/2020 11:34 AM   [place]%Wound Bed 0 4/24/2020 11:34 AM   Red%Wound Bed 0 4/24/2020 11:34 AM   Yellow%Wound Bed 100 4/24/2020 11:34 AM   Black%Wound Bed 0 4/24/2020 11:34 AM   Purple%Wound Bed 0 4/24/2020 11:34 AM   Other%Wound Bed 0 4/24/2020 11:34 AM   Number of days: 21       Wound 04/24/20 Arm Left; Lower;Dorsal (Active)   Wound Skin Tear 4/24/2020 11:34 AM   Dressing Status Changed 4/24/2020 11:34 AM   Dressing Changed Changed/New 4/24/2020 11:34 AM   Wound Cleansed Rinsed/Irrigated with saline 4/24/2020 11:34 AM   Wound Length (cm) 2.5 cm 4/24/2020 11:34 AM   Wound Width (cm) 1.3 cm 4/24/2020 11:34 AM   Wound Depth (cm) 0.1 cm 4/24/2020 11:34 AM   Wound Surface Area (cm^2) 3.25 cm^2 4/24/2020 11:34 AM   Wound Volume (cm^3) 0.32 cm^3 4/24/2020 11:34 AM   Distance Tunneling (cm) 0 cm 4/24/2020 11:34 AM   Tunneling Position ___ O'Clock 0 4/24/2020 11:34 AM   Undermining Starts ___ O'Clock 0 4/24/2020 11:34 AM   Undermining Ends___ O'Clock 0 4/24/2020 11:34 AM   Undermining Maxium Distance (cm) 0 4/24/2020 11:34 AM   Wound Assessment Red 4/24/2020 11:34 AM   Drainage Amount Moderate 4/24/2020 11:34 AM   Drainage Description Serosanguinous 4/24/2020 11:34 AM   Odor None 4/24/2020 11:34 AM   Margins Defined edges 4/24/2020 11:34 AM   Non-staged Wound Description Full thickness 4/24/2020 11:34 AM   [place]%Wound Bed 0 4/24/2020 11:34 AM   Red%Wound Ends___ O'Clock 0 4/24/2020 11:30 AM   Undermining Maxium Distance (cm) 0 4/24/2020 11:30 AM   Wound Assessment Non-blanchable erythema;Red 4/24/2020 11:30 AM   Drainage Amount None 4/24/2020 11:30 AM   Odor None 4/24/2020 11:30 AM   Margins Attached edges 4/24/2020 11:30 AM   Viola-wound Assessment Intact 4/24/2020 11:30 AM   Non-staged Wound Description Not applicable 8/19/0044 59:95 AM   Red%Wound Bed 100 4/24/2020 11:30 AM   Number of days: 0       Response to treatment:  Well tolerated by patient. Pain Assessment:  Severity:  none  Quality of pain: na  Wound Pain Timing/Severity: na  Premedicated: no    Plan:     Plan of Care: Wound 04/24/20 Arm Left; Lower;Dorsal-Dressing/Treatment: (PURACOL OPTIFOAM BORDER)  Wound 04/24/20 Heel Left-Dressing/Treatment: Open to air(skin prep)  Wound 04/03/20 Buttocks Right-Dressing/Treatment: (PURACOL SACRAL BORDER)  Wound 04/03/20 Buttocks Left-Dressing/Treatment: (PURACOL SACRAL BORDER)  Wound 04/03/20 Sacrum intergluteal cleft-Dressing/Treatment: (PURACOL SACRAL BORDER)     Pt in bed. Agreeable to wound assessment. Seen buttock and sacral wounds last on 4/3/20 from separate admission. Wounds noted as above. Pictures and measurements taken. Treatment as above. Recommend Santyl for unstageable wound to sacrum and relayed to Dr. Kusum Rubi. Agreed. Orders placed. Pt positioned to right side as much as pt could tolerated with heels floated with pillow support. Pt is at a high risk for skin breakdown AEB goldy. Follow Goldy orders. Specialty Bed Required : yes  [x] Low Air Loss   [x] Pressure Redistribution  [] Fluid Immersion  [] Bariatric  [] Total Pressure Relief  [] Other:     Discharge Plan:  Placement for patient upon discharge: tbd  Hospice Care: no  Patient appropriate for Outpatient [address]: [facility]    Patient/Caregiver Teaching:  Level of patient/caregiver understanding able to:   Needs reinforcement.         Electronically signed by Chary Quezada RN, on

## 2020-04-24 NOTE — CONSULTS
of systems is reviewed and  is negative except for what is mentioned in the history of present  illness. PHYSICAL EXAMINATION:  GENERAL:  The patient is awake, in no acute respiratory distress. VITAL SIGNS:  Her blood pressure was 104/38 mmHg, pulse of 104 per  minute and respiratory rate of 25 per minute. She is afebrile. Her  saturation is 98% on 4 liters nasal cannula. HEENT:  Essentially unremarkable. NECK:  There is no JVD. No lymphadenopathy. The neck is supple. LUNGS:  Revealed diminished breath sounds, occasional basilar crackles. HEART:  Showed normal S1 and S2. There was no S3 or S4 noted. ABDOMEN:  Benign. LABORATORY DATA:  Her CBC showed a white count of 10.0, hemoglobin 6.6,  hematocrit of 20.7. Her electrolytes showed a sodium of 130, potassium  4.9, chloride 98, carbon dioxide 23, BUN 77, creatinine 1.6. Her blood cultures are growing E. coli. Her respiratory disease panel  PCR was negative. IMPRESSION:  1. Sepsis syndrome, secondary to E. coli, possibly related to  gallbladder versus UTI. 2.  COPD. 3.  Obstructive sleep apnea, on CPAP. 4.  Congestive heart failure. PLAN:  1. Continue broad-spectrum antibiotic coverage. 2.  Combivent four puffs four times a day. 3.  Check COVID test results. 4.  Check mag and phos. 5.  Decongestive therapy. 6.  CAT scan of the abdomen to rule out abscess. 7.  As per orders.         Sudarshan Aldridge MD    D: 04/24/2020 12:49:59       T: 04/24/2020 13:51:52     [email or web address]  Job#: [id number]     Doc#: [id number]    CC:

## 2020-04-25 NOTE — PROGRESS NOTES
heterogeneous collection containing air at the level of the gallbladder fossa with adjacent percutaneous drain as on previous exam from April 2, 2019. No significant interval change. 3. No new fluid collections are identified. 4. Interval development of bilateral nodular densities along the anterior aspect at the imaged lung bases measuring up to 1 cm at the lingula. Given interval development from CT dated April 2, 2020, findings favored to be infectious/inflammatory. Xr Chest Portable    Result Date: 4/24/2020  EXAMINATION: ONE XRAY VIEW OF THE CHEST 4/24/2020 2:18 am COMPARISON: Chest portable April 23, 2020 HISTORY: ORDERING SYSTEM PROVIDED HISTORY: centra line placement     1. Moderate cardiomegaly. 2. Otherwise, unremarkable single AP upright portable view of the chest.     Xr Chest Portable    Result Date: 4/23/2020  EXAMINATION: ONE X-RAY VIEW OF THE CHEST 4/23/2020 12:11 pm  .     Cardiomegaly with prominence of the pulmonary vasculature and interstitial opacities bilaterally most likely representing pulmonary edema.        Scheduled Medicines   Medications:    albuterol sulfate HFA  2 puff Inhalation 4x daily    ipratropium  2 puff Inhalation 4x daily    meropenem  1 g Intravenous Q8H    collagenase   Topical Daily    ferrous sulfate  325 mg Oral Every Other Day    hydrOXYzine  100 mg Oral Nightly    isosorbide mononitrate  30 mg Oral Nightly    levothyroxine  200 mcg Oral Daily    simvastatin  20 mg Oral Nightly    zolpidem  10 mg Oral Nightly    sodium chloride flush  10 mL Intravenous 2 times per day    bumetanide  1 mg Intravenous BID    lactulose  10 g Oral Daily    exemestane  25 mg Oral Daily    albuterol sulfate HFA  2 puff Inhalation Once    pantoprazole  40 mg Oral QAM AC    insulin lispro  0-12 Units Subcutaneous TID WC    insulin lispro  0-6 Units Subcutaneous Nightly      Infusions:    dextrose           Objective:   Vitals: BP (!) 117/52   Pulse 105   Temp 98.9 °F (37.2 °C) (Axillary)   Resp 19   Ht 5' 4\" (1.626 m)   Wt 294 lb 8.6 oz (133.6 kg)   LMP 01/03/2012   SpO2 97%   BMI 50.56 kg/m²   General appearance: alert and cooperative with exam  Neck: no JVD or bruit  Thyroid : Thyroidectomy for cancer of the thyroid  Lungs: Has Vesicular Breath sounds some wheezing and some rales  Heart:  regular rate and rhythm  Abdomen: soft, non-tender; bowel sounds normal; right upper quadrant masses, enlarged liver \  Hepatocellular cancer draining gallbladder area  Musculoskeletal: Normal  Extremities: extremities normal, moderate neuropathy edema  Neurologic:  Awake, alert, oriented to name, place and time. Cranial nerves II-XII are grossly intact. Motor is  intact. Sensory is intact. ,  and gait is ab discussed with nephrology will use of DDAVP uppercase normal.    Assessment:     Patient Active Problem List:     Calculus of ureter     Asthma     Obstructive sleep apnea     Breast mass, left     Rotator cuff arthropathy     Malignant neoplasm of left female breast (HCC)     History of breast lump/mass excision     S/P lymph node biopsy     Cirrhosis of liver without ascites (HCC)     Pain of left breast     S/P radiation therapy 4-12 wks ago     Diabetes mellitus with skin ulcer (HCC)     WD-Ulcer of abdomen wall with fat layer exposed ([address]     Mild persistent asthma without complication     WD-Unspecified open wound of abdominal wall, right lower quadrant without penetration into peritoneal cavity, initial encounter     Retroperitoneal lymphadenopathy     WD-Local infection of skin and subcutaneous tissue     Liver cirrhosis secondary to GALLEGOS (nonalcoholic steatohepatitis) (HCC)     CHF (congestive heart failure), NYHA class I, acute on chronic, combined (HCC)     Low hemoglobin     Right-sided chest wall pain     Shortness of breath     Abnormal transaminases     Hepatocellular carcinoma (HCC)     Hypoglycemia associated with diabetes (HCC)     Unresponsive episode     E

## 2020-04-25 NOTE — PROGRESS NOTES
04/24/20 2307   NIV Type   Mode Bilevel   Mask Type Full face mask   Mask Size Medium   Settings/Measurements   IPAP 12 cmH20   CPAP/EPAP 6 cmH2O   Resp 19   FiO2  40 %   I Time/ I Time % 1.3 s   Vt Exhaled 752 mL   Mask Leak (lpm) 0 lpm   SpO2 99   Alarm Settings   Alarms On Y   Press Low Alarm 3 cmH2O   High Pressure Alarm 20 cmH2O   Apnea (secs) 20 secs   Resp Rate Low Alarm 12   High Respiratory Rate 40 br/min

## 2020-04-25 NOTE — PROGRESS NOTES
Nephrology Progress Note  4/25/2020 12:29 PM        Subjective:   Admit Date: 4/23/2020  PCP: Samantha Bergman MD    Interval History:  No ac event     Diet: some    ROS:  Sob, UOP 1650/d , no fever, on Cardizem drip     Data:     Current meds:    albuterol sulfate HFA  2 puff Inhalation 4x daily    ipratropium  2 puff Inhalation 4x daily    meropenem  1 g Intravenous Q8H    collagenase   Topical Daily    ferrous sulfate  325 mg Oral Every Other Day    hydrOXYzine  100 mg Oral Nightly    isosorbide mononitrate  30 mg Oral Nightly    levothyroxine  200 mcg Oral Daily    simvastatin  20 mg Oral Nightly    zolpidem  10 mg Oral Nightly    sodium chloride flush  10 mL Intravenous 2 times per day    bumetanide  1 mg Intravenous BID    lactulose  10 g Oral Daily    exemestane  25 mg Oral Daily    pantoprazole  40 mg Oral QAM AC    insulin lispro  0-12 Units Subcutaneous TID WC    insulin lispro  0-6 Units Subcutaneous Nightly      diltiazem (CARDIZEM) 100 mg in dextrose 5% 100 mL (ADD-[place])      dextrose           I/O last 3 completed shifts:  In: -   Out: 1650 [Urine:1650]    CBC:   Recent Labs     04/24/20  0535 04/24/20  0602 04/24/20  1857 04/25/20  0440   WBC 10.0 11.1*  --  11.4*   HGB 6.6* 7.0* 7.7* 7.4*   PLT 50* 55*  --  55*          Recent Labs     04/23/20  1224 04/24/20  0535 04/25/20  0440   * 130* 137   K 5.0 4.9 4.0   CL 96* 98* 103   CO2 17* 23 24   BUN 74* 77* 72*   CREATININE 1.8* 1.6* 1.3*   GLUCOSE 117* 174* 139*       Lab Results   Component Value Date    CALCIUM 7.9 (L) 04/25/2020    PHOS 3.8 04/25/2020       Objective:     Vitals: BP (!) 122/48   Pulse 111   Temp 97.8 °F (36.6 °C) (Oral)   Resp 19   Ht 5' 4\" (1.626 m)   Wt 295 lb 10.2 oz (134.1 kg)   LMP 01/03/2012   SpO2 96%   BMI 50.75 kg/m²     General appearance:  No ac distress  HEENT:  + scleral icterus   Neck:  Supple- Lt IJ CVC- no active bleeding   Lungs:  + adv Bs  Heart:  tachycradia  Abdomen: soft- cholecystostomy drain -   Extremities:  + edema       Problem List :         Impression :     1. POLLO- from sepsis  CRS/ abx - etc - stable  2. sepsis with e/coli  With arrhythmia   3. Multiple malignancy gall bladder drain -   4. ADHF/ DM - multi factorial anemia - low mg with IV loop - add IV mag     Recommendation/Plan  :     1. She is on IV loop  2. Keep it  3. Watch UOP   4. She is with abx   5. She is neg for SARS-CoV-2   6.  She still has risk for  POLLO- so watch       Fela Rendon MD

## 2020-04-25 NOTE — PROGRESS NOTES
04/25/20 0341   NIV Type   Mode Bilevel   Mask Type Full face mask   Mask Size Medium   Settings/Measurements   IPAP 12 cmH20   CPAP/EPAP 6 cmH2O   FiO2  40 %   I Time/ I Time % 1.3 s   Vt Exhaled 432 mL   Mask Leak (lpm) 5 lpm   SpO2 99   Alarm Settings   Alarms On Y   Press Low Alarm 3 cmH2O   High Pressure Alarm 20 cmH2O   Apnea (secs) 20 secs   Resp Rate Low Alarm 12   High Respiratory Rate 40 br/min

## 2020-04-25 NOTE — PROGRESS NOTES
female breast ([facility]    History of breast lump/mass excision    S/P lymph node biopsy    Cirrhosis of liver without ascites (HCC)    Pain of left breast    S/P radiation therapy 4-12 wks ago    Diabetes mellitus with skin ulcer (HCC)    WD-Ulcer of abdomen wall with fat layer exposed ([address]    Mild persistent asthma without complication    WD-Unspecified open wound of abdominal wall, right lower quadrant without penetration into peritoneal cavity, initial encounter    Retroperitoneal lymphadenopathy    WD-Local infection of skin and subcutaneous tissue    Liver cirrhosis secondary to GALLEGOS (nonalcoholic steatohepatitis) ([facility]    CHF (congestive heart failure), NYHA class I, acute on chronic, combined (HCC)    Low hemoglobin    Right-sided chest wall pain    Shortness of breath    Abnormal transaminases    Hepatocellular carcinoma (HCC)    Hypoglycemia associated with diabetes (HCC)    Unresponsive episode    E coli bacteremia    Sepsis (HCC)    Cellulitis of buttock    Iron deficiency anemia secondary to blood loss (chronic)    Nonalcoholic steatohepatitis    Secondary thrombocytopenia    Sepsis, unspecified organism ([facility]    HCAP (healthcare-associated pneumonia)    Acute exacerbation of CHF (congestive heart failure) ([facility]    Acute pulmonary edema (HCC)    Acute kidney injury ([address]    Electrolyte disturbance    Acute on chronic respiratory failure with hypoxia (HCC)    Suspected COVID-19 virus infection       Plan:   1. Overall the patient has improved  2. Wean FiO2.  3. A/B as per ID.     Sadia De Leon MD  4/25/2020  10:51 AM

## 2020-04-25 NOTE — PROGRESS NOTES
Report called to [address] [place] [address] on 3N. Pt transported in bed with all belongings including cell phone.

## 2020-04-25 NOTE — PROGRESS NOTES
Progress Note( Dr. Acacia Boone)  4/25/2020  Subjective:   Admit Date: 4/23/2020  PCP: Tessa Jimenez MD    Admitted For :  Severe shortness of breath possibly pulmonary edema    Consulted For: Better control of blood glucose    Interval History: Appears to be somewhat better off BiPAP time moderation  COVID 19 negative   Had One episode of Atrial FIb RX cardiazam     Denies any chest pains,   Denies SOB . Denies nausea or vomiting. Not eating well  No new bowel or bladder symptoms. Intake/Output Summary (Last 24 hours) at 4/25/2020 1001  Last data filed at 4/25/2020 0915  Gross per 24 hour   Intake 10 ml   Output 1650 ml   Net -1640 ml       DATA    CBC:   Recent Labs     04/24/20  0535 04/24/20  0602 04/24/20  1857 04/25/20  0440   WBC 10.0 11.1*  --  11.4*   HGB 6.6* 7.0* 7.7* 7.4*   PLT 50* 55*  --  55*    CMP:  Recent Labs     04/23/20  1224 04/24/20  0535 04/25/20  0440   * 130* 137   K 5.0 4.9 4.0   CL 96* 98* 103   CO2 17* 23 24   BUN 74* 77* 72*   CREATININE 1.8* 1.6* 1.3*   CALCIUM 8.4 7.8* 7.9*   PROT 7.4 6.1* 6.5   LABALBU 2.5* 2.1* 2.1*   BILITOT 2.6* 2.8* 3.6*   ALKPHOS 142* 115 120   AST 45* 47* 61*   ALT 33 29 33     Lipids:   Lab Results   Component Value Date    CHOL 56 04/24/2020    HDL 15 04/24/2020    TRIG 58 04/24/2020     Glucose:  Recent Labs     04/24/20  1751 04/24/20  2148 04/25/20  0937   POCGLU 186* 182* 160*     GhvbeoelmaR2H:  Lab Results   Component Value Date    LABA1C 6.0 04/02/2020     High Sensitivity TSH:   Lab Results   Component Value Date    TSHHS 0.182 04/23/2020     Free T3: No results found for: FT3  Free T4:  Lab Results   Component Value Date    T4FREE 1.28 04/23/2020       Ct Abdomen Pelvis Wo Contrast Additional Contrast? None    Result Date: 4/24/2020  EXAMINATION: CT OF THE ABDOMEN AND PELVIS WITHOUT CONTRAST 4/24/2020 2:50 pm     1.  Redemonstration of heterogeneous mass within the inferior right hepatic lobe which is not well delineated on this

## 2020-04-25 NOTE — PROGRESS NOTES
Juany Lin is a 64 y.o. female patient still has some abd pain    Current Facility-Administered Medications   Medication Dose Route Frequency Provider Last Rate Last Dose    albuterol sulfate  (90 Base) MCG/ACT inhaler 2 puff  2 puff Inhalation 4x daily Carter So MD   2 puff at 04/24/20 1935    ipratropium (ATROVENT HFA) 17 MCG/ACT inhaler 2 puff  2 puff Inhalation 4x daily Carter So MD   2 puff at 04/24/20 1935    meropenem (MERREM) 1 g in sodium chloride 0.9 % 100 mL IVPB (mini-bag)  1 g Intravenous Q8H Thaddeus Hall MD   Stopped at 04/25/20 0650    collagenase ointment   Topical Daily Mauricio Ying MD        morphine sulfate (PF) injection 4 mg  4 mg Intravenous Q4H PRN Mauricio Ying MD   4 mg at 04/24/20 1810    ferrous sulfate (IRON 325) tablet 325 mg  325 mg Oral Every Other Day SHEREE Toney - CNP   325 mg at 04/23/20 2225    gabapentin (NEURONTIN) capsule 300 mg  300 mg Oral TID PRN SHEREE Toney CNP        hydrOXYzine (ATARAX) tablet 100 mg  100 mg Oral Nightly Jo Hanna APRN - CNP   100 mg at 04/24/20 2200    isosorbide mononitrate (IMDUR) extended release tablet 30 mg  30 mg Oral Nightly Jo Hanna APRN - CNP   30 mg at 04/24/20 2150    levothyroxine (SYNTHROID) tablet 200 mcg  200 mcg Oral Daily Jo Hanna APRN - CNP   200 mcg at 04/25/20 3767    simvastatin (ZOCOR) tablet 20 mg  20 mg Oral Nightly Jo Hanna APRN - CNP   20 mg at 04/24/20 2150    zolpidem (AMBIEN) tablet 10 mg  10 mg Oral Nightly SHEREE Toney - CNP   10 mg at 04/24/20 2150    sodium chloride flush 0.9 % injection 10 mL  10 mL Intravenous 2 times per day SHEREE Toney - CNP   10 mL at 04/24/20 2150    sodium chloride flush 0.9 % injection 10 mL  10 mL Intravenous PRN SHEREE Choudhary CNP        polyethylene glycol (GLYCOLAX) packet 17 g  17 g Oral Daily PRN SHEREE Toney CNP        promethazine (PHENERGAN) tablet 12.5 (healthcare-associated pneumonia)    Acute exacerbation of CHF (congestive heart failure) (HCC)    Acute pulmonary edema (HCC)    Acute kidney injury ([facility]    Electrolyte disturbance    Acute on chronic respiratory failure with hypoxia ([facility] [address]    Suspected COVID-19 virus infection  Resolved Problems:    * No resolved hospital problems. *    Blood pressure (!) 103/45, pulse 93, temperature 99 °F (37.2 °C), temperature source Axillary, resp. rate 18, height 5' 4\" (1.626 m), weight 295 lb 10.2 oz (134.1 kg), last menstrual period 01/03/2012, SpO2 98 %, not currently breastfeeding. Subjective:  Symptoms:  Stable. Diet:  Poor intake. Pain:  She complains of pain that is mild. Objective:  General Appearance:  Comfortable. Vital signs: (most recent): Blood pressure (!) 103/45, pulse 93, temperature 99 °F (37.2 °C), temperature source Axillary, resp. rate 18, height 5' 4\" (1.626 m), weight 295 lb 10.2 oz (134.1 kg), last menstrual period 01/03/2012, SpO2 98 %, not currently breastfeeding. Vital signs are normal.    HEENT: Normal HEENT exam.    Lungs: There are decreased breath sounds. Heart: Tachycardia. Abdomen: Abdomen is soft. There is marybeth-umbilical tenderness. Extremities: Decreased range of motion. There is dependent edema. Neurological: Patient is alert. Pupils:  Pupils are equal, round, and reactive to light. Skin:  Warm. There is ecchymosis. Assessment & Plan  Sepsis  due to E.coli bacteremia  -on cefepime and repeat bld cx pending  -has abd drain and cT abd with similar collection near gallbladder.  Consulted surg and high risk for any surg procedure.   -consulted ID  Acute resp failure with suspected gram neg pna/HCAP  -cefepime and vanc changed to meropenem  - bld cx resp PCR neg, strep and legionella pending  -CRP, 182, Procalcitonin 182 ,, DDimer lower compared to previous but high 4083, Ferritin 311  -COVID 19 neg  Acute on chornic diastolic CHF  -Bipap prn -CXR with pulm edema  -bumex and neg 3.45L  POLLO with CKD (resolving)  -hold metformin and aldactone  -diurese and consult nephro  DM  -SSI  -metormin held  Liver cancer  -on lactulose  -oral chemo  -F/u [facility]  JARAD and acute on chorinc anemia  -s/p transfusion 4/24  -ferrous sulfate  -PPI  -has coagulopathy for her liver disease  -given DDAVP yesterday  Severe PCM  -supplements  HTN  -imdur  Hypothyroid  -synthroid  -Free T4 wnl  MOrbid obesity  Thrombocytopenia  -SCD      She is COVID 19 neg and transfer to step down. Addendum. She went into Afib with RVR and 10mg IV cardiazem given and then converted into sinus. Cardiology updated. Has low mag and being replaced. Not a candidate for [facility], [place] with coagulopathy 2/2 liver and also thrombocytopenia.      Suzanna Skinner MD  4/25/2020

## 2020-04-25 NOTE — CONSULTS
Department of GeneralSurgery   Surgical Service Dr Metta Opitz   Consult Note    Date of Consult: 4/24/20      Reason for Consult:  Venkat Delacruz  Requesting Physician:  Dr Rider Viola:  Respiratory distress     History Obtained From:  patient    HISTORY OF PRESENT ILLNESS:                The patient is a 64 y.o. female who presents with resp distress and was ruleout covid-19. Pt was admitted to isolation unit. She is known to Dr Ardella Dandy for attempted lap lianne and placement cholecystostomy tube. Pt was sent to [facility] due to liver cirrhosis and was on liver txp. She was treated there where one of the drains were removed. Pain is described as cramping and pressure-like. Pain level is 3/10. She has repeat CT done showing some possible fluid collection vs hematoma vs perihepatic mass. Pt presumably has her GB. She also has drain that is located in the RUQ.      Past Medical History:    Past Medical History:   Diagnosis Date    Anxiety     Asthma     Cancer ([address]     breast(left) cancer- dx 10/2015- tx with radiation- following with Dr Kathy Castrejon of liver, [facility])     liver    CHF (congestive heart failure) ([address]     Chronic ulcer of right thigh with fat layer exposed ([address]     COPD (chronic obstructive pulmonary disease) ([address]     follow with Dr Carolyn Simmons Diabetes mellitus ([address]     dx10+ yrs ago- follows with Dr Araceli Steel    Diabetes mellitus with skin ulcer ([address]     History of kidney stones     \"last one 3/2016- passed it- have had stones off and on for past 5 yrs follow with Dr Cholo Blevins Hypertension     Liver cirrhosis ([address]     for liver bx 7/10/2017    Morbid obesity ([address] 10/20/2015    PT TOO LARGE FOR MRI LEFT SHOULDER    On home oxygen therapy     oxygen at 4l/nc at hs    PONV (postoperative nausea and vomiting)     \"did get sick with the breast surgery\"    Sleep apnea     \"had sleep study several yrs ago- c-pap  does use it\"    Thyroid disease     WD-Local infection of skin and subcutaneous tissue 12/8/2017    WD-Ulcer of abdomen wall, limited to breakdown of skin ([address]     WD-Unspecified open wound of abdominal wall, right lower quadrant without penetration into peritoneal cavity, initial encounter        Past Surgical History:    Past Surgical History:   Procedure Laterality Date    ABDOMEN SURGERY N/A     BREAST SURGERY Left 2015    EXCISION MASS\"lumpectomy- took out 3 lymph nodes all ok\"    CARDIAC CATHETERIZATION  01/21/2020    no intervention    CYSTOSCOPY Left 12/23/13    stent placement    LAPAROSCOPY N/A 3/2/2020    ATTEMPTED CHOLECYSTECTOMY LAPAROSCOPIC WITH IOC ABORTED BECAUSE OF LIVER CIRRHOSIS, CHOLECYSTOSTOMY TUBE PLACED, DRAIN PLACED performed by Suzan Viveros MD at [address]  07/10/2017    LIVER SURGERY      TACE procedure in Aug 2019   Doctor [address]    \"took most of the thyroid out\"    UPPER GASTROINTESTINAL ENDOSCOPY         Current Medications:   Current Facility-Administered Medications   Medication Dose Route Frequency Provider Last Rate Last Dose    albuterol sulfate  (90 Base) MCG/ACT inhaler 2 puff  2 puff Inhalation 4x daily Katelyn Rajput MD   2 puff at 04/24/20 1935    ipratropium (ATROVENT HFA) 17 MCG/ACT inhaler 2 puff  2 puff Inhalation 4x daily Katelyn Rajput MD   2 puff at 04/24/20 1935    meropenem (MERREM) 1 g in sodium chloride 0.9 % 100 mL IVPB (mini-bag)  1 g Intravenous Q8H Yanely Perkins  mL/hr at 04/24/20 2149 1 g at 04/24/20 2149    collagenase ointment   Topical Daily Bibi Hawk MD        morphine sulfate (PF) injection 4 mg  4 mg Intravenous Q4H PRN Bibi Hawk MD   4 mg at 04/24/20 1810    ferrous sulfate (IRON 325) tablet 325 mg  325 mg Oral Every Other Day SHEREE Donald CNP   325 mg at 04/23/20 2225    gabapentin (NEURONTIN) capsule 300 mg  300 mg Oral TID PRN SHEREE Donald CNP        hydrOXYzine (ATARAX) tablet 100 mg  100 mg Oral Nightly Abbie Normal rate. Pulmonary:      Effort: Respiratory distress present. Breath sounds: Rales present. Abdominal:      General: There is no distension. Palpations: Abdomen is soft. There is no mass. Tenderness: There is abdominal tenderness (drain bilious ). There is no guarding or rebound. Musculoskeletal: Normal range of motion. Skin:     General: Skin is warm. Neurological:      Mental Status: She is alert. Mental status is at baseline.            DATA:    CBC with Differential:    Lab Results   Component Value Date    WBC 11.1 04/24/2020    RBC 2.58 04/24/2020    RBC 3.97 08/04/2017    HGB 7.7 04/24/2020    HCT 24.0 04/24/2020    PLT 55 04/24/2020    MCV 86.8 04/24/2020    MCH 27.1 04/24/2020    MCHC 31.3 04/24/2020    RDW 19.0 04/24/2020    NRBC 3 03/03/2020    SEGSPCT 76.5 04/24/2020    BANDSPCT 12 04/23/2020    LYMPHOPCT 9.1 04/24/2020    LYMPHOPCT 19.2 08/04/2017    MONOPCT 12.5 04/24/2020    MYELOPCT 1 03/02/2020    EOSPCT 0.5 11/02/2010    BASOPCT 0.2 04/24/2020    MONOSABS 1.4 04/24/2020    LYMPHSABS 1.0 04/24/2020    EOSABS 0.0 04/24/2020    BASOSABS 0.0 04/24/2020    DIFFTYPE AUTOMATED DIFFERENTIAL 04/24/2020     CMP:    Lab Results   Component Value Date     04/24/2020    K 4.9 04/24/2020    CL 98 04/24/2020    CO2 23 04/24/2020    BUN 77 04/24/2020    CREATININE 1.6 04/24/2020    GFRAA 40 04/24/2020    LABGLOM 33 04/24/2020    GLUCOSE 174 04/24/2020    PROT 6.1 04/24/2020    PROT 8.3 12/31/2012    LABALBU 2.1 04/24/2020    CALCIUM 7.8 04/24/2020    BILITOT 2.8 04/24/2020    ALKPHOS 115 04/24/2020    AST 47 04/24/2020    ALT 29 04/24/2020       IMPRESSION:        Patient Active Problem List:     Calculus of ureter     Asthma     Obstructive sleep apnea     Breast mass, left     Rotator cuff arthropathy     Malignant neoplasm of left female breast (HCC)     History of breast lump/mass excision     S/P lymph node biopsy     Cirrhosis of liver without ascites (HCC)     Pain of left breast     S/P radiation therapy 4-12 wks ago     Diabetes mellitus with skin ulcer (HCC)     WD-Ulcer of abdomen wall with fat layer exposed ([address]     Mild persistent asthma without complication     WD-Unspecified open wound of abdominal wall, right lower quadrant without penetration into peritoneal cavity, initial encounter     Retroperitoneal lymphadenopathy     WD-Local infection of skin and subcutaneous tissue     Liver cirrhosis secondary to GALLEGOS (nonalcoholic steatohepatitis) (HCC)     CHF (congestive heart failure), NYHA class I, acute on chronic, combined (HCC)     Low hemoglobin     Right-sided chest wall pain     Shortness of breath     Abnormal transaminases     Hepatocellular carcinoma (HCC)     Hypoglycemia associated with diabetes (HCC)     Unresponsive episode     E coli bacteremia     Sepsis (HCC)     Cellulitis of buttock     Iron deficiency anemia secondary to blood loss (chronic)     Nonalcoholic steatohepatitis     Secondary thrombocytopenia     Sepsis, unspecified organism ([address]     HCAP (healthcare-associated pneumonia)     Acute exacerbation of CHF (congestive heart failure) ([address]     Acute pulmonary edema (HCC)     Acute kidney injury ([address]     Electrolyte disturbance     Acute on chronic respiratory failure with hypoxia ([address]     Suspected COVID-19 virus infection      Ecoli Bacteremia     PLAN:    Appreciate ID input. Pt is high risk for surgery or IR procedure due to increased bleeding. If she is not responding to Iv abx recommend txf pt to [facility] transplant team for further care.          Florencia Kirkland MD

## 2020-04-26 NOTE — PROGRESS NOTES
Pt had 11 beat run of Vtach. Asymptomatic. HR in Afib w/RVR up to 170's. Spoke to Dr Jordan Cruz. Cardizem gtt ordered and started at 5 mL/hr. Will continue to monitor.

## 2020-04-26 NOTE — PROGRESS NOTES
exam.  Enlargement of the mass cannot be excluded. Patient with medical history of liver/breast cancer. 2. Similar appearance of heterogeneous collection containing air at the level of the gallbladder fossa with adjacent percutaneous drain as on previous exam from April 2, 2019. No significant interval change. 3. No new fluid collections are identified. 4. Interval development of bilateral nodular densities along the anterior aspect at the imaged lung bases measuring up to 1 cm at the lingula. Given interval development from CT dated April 2, 2020, findings favored to be infectious/inflammatory. Xr Chest Portable    Result Date: 4/24/2020  EXAMINATION: ONE XRAY VIEW OF THE CHEST 4/24/2020 2:18 am COMPARISON: Chest portable April 23, 2020 HISTORY: ORDERING SYSTEM PROVIDED HISTORY: centra line placement     1. Moderate cardiomegaly. 2. Otherwise, unremarkable single AP upright portable view of the chest.     Xr Chest Portable    Result Date: 4/23/2020  EXAMINATION: ONE X-RAY VIEW OF THE CHEST 4/23/2020 12:11 pm  .     Cardiomegaly with prominence of the pulmonary vasculature and interstitial opacities bilaterally most likely representing pulmonary edema.        Scheduled Medicines   Medications:    dilTIAZem  60 mg Oral 4 times per day    albuterol sulfate HFA  2 puff Inhalation 4x daily    ipratropium  2 puff Inhalation 4x daily    meropenem  1 g Intravenous Q8H    collagenase   Topical Daily    ferrous sulfate  325 mg Oral Every Other Day    hydrOXYzine  100 mg Oral Nightly    isosorbide mononitrate  30 mg Oral Nightly    levothyroxine  200 mcg Oral Daily    simvastatin  20 mg Oral Nightly    zolpidem  10 mg Oral Nightly    sodium chloride flush  10 mL Intravenous 2 times per day    bumetanide  1 mg Intravenous BID    lactulose  10 g Oral Daily    exemestane  25 mg Oral Daily    pantoprazole  40 mg Oral QAM AC    insulin lispro  0-12 Units Subcutaneous TID WC    insulin lispro  0-6 Units Low hemoglobin     Right-sided chest wall pain     Shortness of breath     Abnormal transaminases     Hepatocellular carcinoma (HCC)     Hypoglycemia associated with diabetes (HCC)     Unresponsive episode     E coli bacteremia     Sepsis (HCC)     Cellulitis of buttock     Iron deficiency anemia secondary to blood loss (chronic)     Nonalcoholic steatohepatitis     Secondary thrombocytopenia     Sepsis, unspecified organism ([facility]     HCAP (healthcare-associated pneumonia)     Acute exacerbation of CHF (congestive heart failure) ([facility]     Acute pulmonary edema (HCC)     Acute kidney injury ([facility]     Electrolyte disturbance     Acute on chronic respiratory failure with hypoxia ([facility]     Suspected COVID-19 virus infection Negative       Plan:     1. Reviewed POC blood glucose . Labs and X ray results   2. Reviewed Current Medicines   3. On Correction bolus Humalog/ Basal Insulin regime  4. Monitor Blood glucose frequently   5. Modified  the dose of Insulin/ other medicines as needed   6. Will discuss with nephrology the use of DDAVP in her case  7. Will follow     .      Petrona Christensen MD

## 2020-04-26 NOTE — PROGRESS NOTES
Nephrology Progress Note  4/26/2020 11:43 AM        Subjective:   Admit Date: 4/23/2020  PCP: Cheryle Peppers, MD    Interval History: out of COVID floor     Diet: some    ROS:  No overt sob, fatigue. abd pain    Data:     Current med's:    dilTIAZem  60 mg Oral 4 times per day    albuterol sulfate HFA  2 puff Inhalation 4x daily    ipratropium  2 puff Inhalation 4x daily    meropenem  1 g Intravenous Q8H    collagenase   Topical Daily    ferrous sulfate  325 mg Oral Every Other Day    hydrOXYzine  100 mg Oral Nightly    isosorbide mononitrate  30 mg Oral Nightly    levothyroxine  200 mcg Oral Daily    simvastatin  20 mg Oral Nightly    zolpidem  10 mg Oral Nightly    sodium chloride flush  10 mL Intravenous 2 times per day    bumetanide  1 mg Intravenous BID    lactulose  10 g Oral Daily    exemestane  25 mg Oral Daily    pantoprazole  40 mg Oral QAM AC    insulin lispro  0-12 Units Subcutaneous TID WC    insulin lispro  0-6 Units Subcutaneous Nightly      dextrose           I/O last 3 completed shifts: In: 36 [P.O.:720;  I.V.:10]  Out: 400 [Urine:400]    CBC:   Recent Labs     04/24/20  0602 04/24/20  1857 04/25/20  0440 04/26/20  0600   WBC 11.1*  --  11.4* 18.7*   HGB 7.0* 7.7* 7.4* 8.2*   PLT 55*  --  55* 57*          Recent Labs     04/24/20  0535 04/25/20  0440 04/26/20  0600   * 137 132*   K 4.9 4.0 4.2   CL 98* 103 99   CO2 23 24 23   BUN 77* 72* 73*   CREATININE 1.6* 1.3* 1.3*   GLUCOSE 174* 139* 177*       Lab Results   Component Value Date    CALCIUM 7.9 (L) 04/26/2020    PHOS 3.8 04/25/2020       Objective:     Vitals: BP (!) 132/47   Pulse 92   Temp 97.7 °F (36.5 °C) (Oral)   Resp 23   Ht 5' 4\" (1.626 m)   Wt 286 lb 1.6 oz (129.8 kg)   LMP 01/03/2012   SpO2 94%   BMI 49.11 kg/m²     General appearance:  No ac distress  HEENT:  + conj pallor, scleral icterus   Neck:  Supple- Lt IJ CVC no active bleed   Lungs:  Limited exam , no gross crackles  Heart:  Seems

## 2020-04-26 NOTE — PROGRESS NOTES
Admit Date:  4/23/2020    Admission diagnosis / Complaint : SOB      Subjective:  Ms. Nel Fernandez states she is feeling better today. She is off her bipap. She denies any chest pain. Objective:   BP (!) 127/51   Pulse 92   Temp 97.7 °F (36.5 °C) (Oral)   Resp 23   Ht 5' 4\" (1.626 m)   Wt 286 lb 1.6 oz (129.8 kg)   LMP 01/03/2012   SpO2 94%   BMI 49.11 kg/m²       Intake/Output Summary (Last 24 hours) at 4/26/2020 0955  Last data filed at 4/26/2020 6865  Gross per 24 hour   Intake 720 ml   Output 400 ml   Net 320 ml       TELEMETRY: Atrial fibrillation /SR/ had 11 beat run of VT last PM   has a past medical history of Anxiety, Asthma, Cancer ([address] Cancer of liver, primary ([address] CHF (congestive heart failure) ([address] Chronic ulcer of right thigh with fat layer exposed ([address] COPD (chronic obstructive pulmonary disease) ([address] Diabetes mellitus ([address] Diabetes mellitus with skin ulcer ([address] History of kidney stones, Hypertension, Liver cirrhosis ([address] Morbid obesity ([address] On home oxygen therapy, PONV (postoperative nausea and vomiting), Sleep apnea, Thyroid disease, WD-Local infection of skin and subcutaneous tissue, WD-Ulcer of abdomen wall, limited to breakdown of skin ([address] and WD-Unspecified open wound of abdominal wall, right lower quadrant without penetration into peritoneal cavity, initial encounter. has a past surgical history that includes Thyroidectomy (1990); Cystoscopy (Left, 12/23/13); Breast surgery (Left, 2015); Upper gastrointestinal endoscopy; liver biopsy (07/10/2017); Liver surgery; Cardiac catheterization (01/21/2020); Abdomen surgery (N/A); and laparoscopy (N/A, 3/2/2020).     hysical Exam:  General:  Awake, alert, NAD obese  Skin:  Warm and dry  Neck:  JVD not appreciated- CVC noted to the right neck  Chest:  Fine bi basilar crackles/ diminishes breath sounds   Cardiovascular:  RRR S1S2  Abdomen: large round tender  DEMETRIO noted to right lower abd  Extremities:  +

## 2020-04-27 NOTE — PROGRESS NOTES
seen with total face to face time of 25 minutes. More than 50% of this visit was counseling and education as above in my assessment and plan section of my note.

## 2020-04-27 NOTE — PROGRESS NOTES
Nephrology Progress Note  4/27/2020 11:08 AM        Subjective:   Admit Date: 4/23/2020  PCP: Jamil Grier MD    Interval History: low UOP     Diet: some     ROS:  No overt son, low UOP    Data:     Current meds:    glipiZIDE  5 mg Oral QAM AC    cefTRIAXone (ROCEPHIN) IV  2 g Intravenous Q24H    metroNIDAZOLE  500 mg Intravenous Q8H    dilTIAZem  60 mg Oral 4 times per day    albuterol sulfate HFA  2 puff Inhalation 4x daily    ipratropium  2 puff Inhalation 4x daily    collagenase   Topical Daily    ferrous sulfate  325 mg Oral Every Other Day    hydrOXYzine  100 mg Oral Nightly    isosorbide mononitrate  30 mg Oral Nightly    levothyroxine  200 mcg Oral Daily    simvastatin  20 mg Oral Nightly    zolpidem  10 mg Oral Nightly    sodium chloride flush  10 mL Intravenous 2 times per day    lactulose  10 g Oral Daily    exemestane  25 mg Oral Daily    pantoprazole  40 mg Oral QAM AC    insulin lispro  0-12 Units Subcutaneous TID WC    insulin lispro  0-6 Units Subcutaneous Nightly      dextrose           I/O last 3 completed shifts:   In: 240 [P.O.:240]  Out: 390 [Urine:250; Drains:140]    CBC:   Recent Labs     04/25/20  0440 04/26/20  0600 04/27/20  0600   WBC 11.4* 18.7* 25.2*   HGB 7.4* 8.2* 8.6*   PLT 55* 57* 68*          Recent Labs     04/25/20  0440 04/26/20  0600 04/27/20  0600    132* 136   K 4.0 4.2 4.6    99 100   CO2 24 23 23   BUN 72* 73* 83*   CREATININE 1.3* 1.3* 1.7*   GLUCOSE 139* 177* 152*       Lab Results   Component Value Date    CALCIUM 8.2 (L) 04/27/2020    PHOS 3.8 04/25/2020       Objective:     Vitals: BP (!) 131/55   Pulse 84   Temp 97.5 °F (36.4 °C) (Oral)   Resp 18   Ht 5' 4\" (1.626 m)   Wt 286 lb 1.6 oz (129.8 kg)   LMP 01/03/2012   SpO2 98%   BMI 49.11 kg/m²     General appearance:  No ac distress  HEENT:  + scleral icterus, + conj pallor  Neck:  Supple- Lt IJ CVC   Lungs:  No gross crackles  Heart:  RRR  Abdomen: soft, gall  Bladder drain  Extremities:  No gross edema       Problem List :         Impression :     1. POLLO- CKD stage 3 - cr up and UOP low- potential d/d- inadequate palms ref, AIN/ any additional ATI from meds/ infection etc - she  Has  pete so obstruction less likely - she lost wt- if accurate - perhaps  We should hold loop   2. Sepsis  With GNR  3. recent [address] with cholecystectomy tube   4. recent ADHF- seems compensated/ DM/ anemia   5. Multiple malignancy     Recommendation/Plan  :     1. Hold loop \  2. Redo plain UA and urinary indices  3. Avoid non essential med's  4. watch UOP  5. BMP in am   6.  Daily wt'  7.  pro BNP and CXR       Floretta Soulier MD

## 2020-04-27 NOTE — PROGRESS NOTES
Value Ref Range    POC Glucose 212 (H) 70 - 99 MG/DL   Magnesium    Collection Time: 04/27/20  6:00 AM   Result Value Ref Range    Magnesium 2.1 1.8 - 2.4 mg/dl   CBC auto differential    Collection Time: 04/27/20  6:00 AM   Result Value Ref Range    WBC 25.2 (H) 4.0 - 10.5 K/CU MM    RBC 3.12 (L) 4.2 - 5.4 M/CU MM    Hemoglobin 8.6 (L) 12.5 - 16.0 GM/DL    Hematocrit 27.0 (L) 37 - 47 %    MCV 86.5 78 - 100 FL    MCH 27.6 27 - 31 PG    MCHC 31.9 (L) 32.0 - 36.0 %    RDW 19.8 (H) 11.7 - 14.9 %    Platelets 68 (L) 957 - 440 K/CU MM    Bands Relative 11 5 - 11 %    Segs Relative 84.0 (H) 36 - 66 %    Lymphocytes % 4.0 (L) 24 - 44 %    Monocytes % 1.0 0 - 4 %    nRBC 1     Bands Absolute 2.77 K/CU MM    Segs Absolute 21.1 K/CU MM    Lymphocytes Absolute 1.0 K/CU MM    Monocytes Absolute 0.3 K/CU MM    Differential Type MANUAL DIFFERENTIAL     Anisocytosis 1+     Polychromasia 1+     Basophilic Stippling PRESENT     Toxic Granulation PRESENT     PLT Morphology DECREASED    Comprehensive Metabolic Panel w/ Reflex to MG    Collection Time: 04/27/20  6:00 AM   Result Value Ref Range    Sodium 136 135 - 145 MMOL/L    Potassium 4.6 3.5 - 5.1 MMOL/L    Chloride 100 99 - 110 mMol/L    CO2 23 21 - 32 MMOL/L    BUN 83 (H) 6 - 23 MG/DL    CREATININE 1.7 (H) 0.6 - 1.1 MG/DL    Glucose 152 (H) 70 - 99 MG/DL    Calcium 8.2 (L) 8.3 - 10.6 MG/DL    Alb 2.1 (L) 3.4 - 5.0 GM/DL    Total Protein 7.1 6.4 - 8.2 GM/DL    Total Bilirubin 3.3 (H) 0.0 - 1.0 MG/DL    ALT 41 (H) 10 - 40 U/L    AST 66 (H) 15 - 37 IU/L    Alkaline Phosphatase 156 (H) 40 - 128 IU/L    GFR Non- 31 (L) >60 mL/min/1.73m2    GFR  37 (L) >60 mL/min/1.73m2    Anion Gap 13 4 - 16   C-reactive protein    Collection Time: 04/27/20  6:00 AM   Result Value Ref Range    CRP, High Sensitivity 219.2 mg/L   Procalcitonin    Collection Time: 04/27/20  6:00 AM   Result Value Ref Range    Procalcitonin 1.39    POCT Glucose    Collection Time: 04/27/20  Acute on chronic respiratory failure with hypoxia (HCC)    Suspected COVID-19 virus infection       Active Problems  Principal Problem:    Sepsis, unspecified organism ([address]  Active Problems:    HCAP (healthcare-associated pneumonia)    Acute exacerbation of CHF (congestive heart failure) ([address]    Acute pulmonary edema (HCC)    Acute kidney injury (HCC)    Electrolyte disturbance    Acute on chronic respiratory failure with hypoxia ([address]    Suspected COVID-19 virus infection  Resolved Problems:    * No resolved hospital problems.  *    Electronically signed by: Electronically signed by Adal Dupont MD on 4/27/2020 at 2:43 PM

## 2020-04-27 NOTE — PROGRESS NOTES
angle tenderness. Normal appearing external genitalia. [place] catheter is not present. HEME/LYMPH No palpable cervical lymphadenopathy and no hepatosplenomegaly. No petechiae or ecchymoses. MSK No gross joint deformities. SKIN Normal coloration, warm, dry. NEURO Cranial nerves appear grossly intact, normal speech, no lateralizing weakness. PSYCH Awake, alert, oriented x 4. Affect appropriate.     Medications:   Medications:    glipiZIDE  5 mg Oral QAM AC    ipratropium-albuterol  1 ampule Inhalation Q4H WA    cefTRIAXone (ROCEPHIN) IV  2 g Intravenous Q24H    metroNIDAZOLE  500 mg Intravenous Q8H    dilTIAZem  60 mg Oral 4 times per day    collagenase   Topical Daily    ferrous sulfate  325 mg Oral Every Other Day    hydrOXYzine  100 mg Oral Nightly    isosorbide mononitrate  30 mg Oral Nightly    levothyroxine  200 mcg Oral Daily    simvastatin  20 mg Oral Nightly    zolpidem  10 mg Oral Nightly    sodium chloride flush  10 mL Intravenous 2 times per day    lactulose  10 g Oral Daily    exemestane  25 mg Oral Daily    pantoprazole  40 mg Oral QAM AC    insulin lispro  0-12 Units Subcutaneous TID WC    insulin lispro  0-6 Units Subcutaneous Nightly      Infusions:    dextrose       PRN Meds: morphine, 4 mg, Q4H PRN  gabapentin, 300 mg, TID PRN  sodium chloride flush, 10 mL, PRN  polyethylene glycol, 17 g, Daily PRN  promethazine, 12.5 mg, Q6H PRN    Or  ondansetron, 4 mg, Q6H PRN  albuterol sulfate HFA, 2 puff, Q6H PRN  acetaminophen, 650 mg, Q6H PRN    Or  acetaminophen, 650 mg, Q6H PRN  glucose, 15 g, PRN  dextrose, 12.5 g, PRN  glucagon (rDNA), 1 mg, PRN  dextrose, 100 mL/hr, PRN          Electronically signed by Kimber Tripathi MD on 4/27/2020 at 12:41 PM

## 2020-04-27 NOTE — PROGRESS NOTES
for changes mentioned above. Thank you very much for consult , please call with questions. Electronically signed by HSEREE Allen CNP on 4/27/2020 at 11:47 AM      CARDIOLOGY ATTENDING ADDENDUM    I have seen ,spoken to  and examined this patient personally, independently of the nurse practitioner. I have reviewed the hospital care given to date and reviewed all pertinent labs and imaging. The plan was developed mutually at the time of the visit with the patient,  NP   and myself. I have spoken with patient, nursing staff and provided written and verbal instructions . The above note has been reviewed and I agree with the assessment, diagnosis, and treatment plan with changes made by me as follows     HPI:  I have reviewed the above HPI  And agree with above   Yury Welsh is a 64 y. o.year old who and presents with had concerns including Shortness of Breath. Chief Complaint   Patient presents with    Shortness of Breath     Please review addendum/changes made to note above   Interval history:  Breathing is better , in sinus    Physical Exam:  General:  Awake, alert, NAD  Head:normal  Eye:normal  Neck:  No JVD   Chest:  Clear to auscultation, respiration easy  Cardiovascular:  S1 and S2 audible, No added heart sounds, No signs of ankle edema, or volume overload, No evidence of JVD, No crackles  Abdomen:   nontender  Extremities:  1+ edema  Pulses; palpable  Neuro: grossly normal      MEDICAL DECISION MAKING;    I agree with the above plan, which was planned by myself and discussed with NP.   Start eliquis  Continue cardizem   bumex held bynephrology      Uriel Perez MD [facility] [place] 04/27/20

## 2020-04-28 NOTE — PROGRESS NOTES
trace       Problem List :         Impression :     1. POLLO- CKD stage 3- low UOP- cr higher - plain ua has some wbc  And RBC and hyaline cast(  THP)  - so ATI/AIN all possibility - will start from simple as she has dm - so will add just 1 liter NS - she has no pulmo edema and mainly RHF- so likely has risk for reduced LV out put dorene with recent diuresis - if ineffective and has risk for AIn than add emp steroid - her loop on hold   2. Hypoglycemia- I suspect with [place] and glipizide is staying longer- will hold glipizide and add dextrose - I have called Dr Amira Do so he we are at the same  page   3. Sepsis with e.coli - has [address] cholecystostomy with drain etc     Recommendation/Plan  :     1. 1 liter NS with dextrose   2. Hold sulfonylurea   3. Watch UOP  BMP in am   4. No pulmo edema on CXR   5. If renal Fx does not improve than consider emp steroid for presumable  AIN  And d/w Dr Colonel Gil   6.  Follow clinically       Caffie Ahumada MD

## 2020-04-28 NOTE — PROGRESS NOTES
Checked in on patient. She said she didn't want the BiPAP mask on. She wore it earlier last evening.

## 2020-04-28 NOTE — PROGRESS NOTES
Pulmonary and Critical Care  Progress Note    Subjective: The patient is better. Shortness of breath better. Chest pain none. Addressing respiratory complaints Patient is negative for  hemoptysis and cyanosis. CONSTITUTIONAL:  negative for fevers and chills. Past Medical History:     has a past medical history of Anxiety, Asthma, Cancer ([address] Cancer of liver, primary ([address] CHF (congestive heart failure) ([address] Chronic ulcer of right thigh with fat layer exposed ([address] COPD (chronic obstructive pulmonary disease) ([address] Diabetes mellitus ([address] Diabetes mellitus with skin ulcer ([address] History of kidney stones, Hypertension, Liver cirrhosis ([address] Morbid obesity ([address] On home oxygen therapy, PONV (postoperative nausea and vomiting), Sleep apnea, Thyroid disease, WD-Local infection of skin and subcutaneous tissue, WD-Ulcer of abdomen wall, limited to breakdown of skin ([address] and WD-Unspecified open wound of abdominal wall, right lower quadrant without penetration into peritoneal cavity, initial encounter. has a past surgical history that includes Thyroidectomy (1990); Cystoscopy (Left, 12/23/13); Breast surgery (Left, 2015); Upper gastrointestinal endoscopy; liver biopsy (07/10/2017); Liver surgery; Cardiac catheterization (01/21/2020); Abdomen surgery (N/A); and laparoscopy (N/A, 3/2/2020). reports that she quit smoking about 15 years ago. She has a 30.00 pack-year smoking history. She has never used smokeless tobacco. She reports that she does not drink alcohol or use drugs. Family history:  family history includes Asthma in her sister; Cancer in her brother and mother; Diabetes in her father and mother; Heart Disease in her father and sister; Kidney Disease in her brother; Other in her brother and sister.     Allergies   Allergen Reactions    Latex     Lasix [Furosemide] Hives    Sulfa Antibiotics Rash    Nystatin Other (See Comments)     Pt reports \"burning\" sensation to skin    Tape of left breast    S/P radiation therapy 4-12 wks ago    Diabetes mellitus with skin ulcer ([address]    WD-Ulcer of abdomen wall with fat layer exposed ([address]    Mild persistent asthma without complication    WD-Unspecified open wound of abdominal wall, right lower quadrant without penetration into peritoneal cavity, initial encounter    Retroperitoneal lymphadenopathy    WD-Local infection of skin and subcutaneous tissue    Liver cirrhosis secondary to GALLEGOS (nonalcoholic steatohepatitis) ([address]    CHF (congestive heart failure), NYHA class I, acute on chronic, combined (HCC)    Low hemoglobin    Right-sided chest wall pain    Shortness of breath    Abnormal transaminases    Hepatocellular carcinoma (HCC)    Hypoglycemia associated with diabetes (HCC)    Unresponsive episode    E coli bacteremia    Sepsis (HCC)    Cellulitis of buttock    Iron deficiency anemia secondary to blood loss (chronic)    Nonalcoholic steatohepatitis    Secondary thrombocytopenia    Sepsis, unspecified organism ([address]    HCAP (healthcare-associated pneumonia)    Acute exacerbation of CHF (congestive heart failure) ([address]    Acute pulmonary edema (HCC)    Acute kidney injury ([address]    Electrolyte disturbance    Acute on chronic respiratory failure with hypoxia (HCC)    Suspected COVID-19 virus infection       Plan:   1. Overall the patient has improved. 2. [address] Activity.     Katelyn Rajput MD  4/28/2020  11:00 AM

## 2020-04-28 NOTE — PROGRESS NOTES
by SHEREE Sanchez CNP on 4/28/2020 at 11:28 AM      CARDIOLOGY ATTENDING ADDENDUM    I have seen ,spoken to  and examined this patient personally, independently of the nurse practitioner. I have reviewed the hospital care given to date and reviewed all pertinent labs and imaging. The plan was developed mutually at the time of the visit with the patient,  NP   and myself. I have spoken with patient, nursing staff and provided written and verbal instructions . The above note has been reviewed and I agree with the assessment, diagnosis, and treatment plan with changes made by me as follows     HPI:  I have reviewed the above HPI  And agree with above   Nelson Chowdhury is a 64 y. o.year old who and presents with had concerns including Shortness of Breath. Chief Complaint   Patient presents with    Shortness of Breath     Please review addendum/changes made to note above   Interval history: Breathing is significantly improved she is feeling better    Physical Exam:  General:  Awake, alert, NAD  Head:normal  Eye:normal  Neck:  No JVD   Chest:  Clear to auscultation, respiration easy  Cardiovascular:  S1 and S2 audible, No added heart sounds, No signs of ankle edema, or volume overload, No evidence of JVD, No crackles  Abdomen:   nontender  Extremities:  2+edema  Pulses; palpable  Neuro: grossly normal      MEDICAL DECISION MAKING;    I agree with the above plan, which was planned by myself and discussed with NP.     Add  Eliquis  Will sign off  Change cardizem to cardizem [id number] , MD [facility] [place] 04/28/20

## 2020-04-28 NOTE — PROGRESS NOTES
Subcutaneous TID WC    insulin lispro  0-6 Units Subcutaneous Nightly      Infusions:    dextrose           Objective:   Vitals: BP (!) 118/40   Pulse 81   Temp 97.9 °F (36.6 °C) (Oral)   Resp 16   Ht 5' 4\" (1.626 m)   Wt 286 lb 1.6 oz (129.8 kg)   LMP 01/03/2012   SpO2 97%   BMI 49.11 kg/m²   General appearance: alert and cooperative with exam  Neck: no JVD or bruit  Thyroid : Thyroidectomy for cancer of the thyroid  Lungs: Has Vesicular Breath sounds some wheezing and some rales  Heart:  regular rate and rhythm  Abdomen: soft, non-tender; bowel sounds normal; right upper quadrant masses, enlarged liver \  Hepatocellular cancer draining gallbladder area  Musculoskeletal: Normal  Extremities: extremities normal, moderate neuropathy edema  Neurologic:  Awake, alert, oriented to name, place and time. Cranial nerves II-XII are grossly intact. Motor is  intact. Sensory is intact. ,  and gait is ab discussed with nephrology will use of DDAVP uppercase normal.    Assessment:     Patient Active Problem List:     Calculus of ureter     Asthma     Obstructive sleep apnea     Breast mass, left     Rotator cuff arthropathy     Malignant neoplasm of left female breast (HCC)     History of breast lump/mass excision     S/P lymph node biopsy     Cirrhosis of liver without ascites (HCC)     Pain of left breast     S/P radiation therapy 4-12 wks ago     Diabetes mellitus with skin ulcer (HCC)     WD-Ulcer of abdomen wall with fat layer exposed ([address]     Mild persistent asthma without complication     WD-Unspecified open wound of abdominal wall, right lower quadrant without penetration into peritoneal cavity, initial encounter     Retroperitoneal lymphadenopathy     WD-Local infection of skin and subcutaneous tissue     Liver cirrhosis secondary to GALLEGOS (nonalcoholic steatohepatitis) (HCC)     CHF (congestive heart failure), NYHA class I, acute on chronic, combined (HCC)     Low hemoglobin     Right-sided chest wall

## 2020-04-28 NOTE — PROGRESS NOTES
CARDIOVASCULAR:  Normal rate, trace pedal edema  ABDOMEN:  non distended  MUSCULOSKELETAL:  ROM limited  NEUROLOGIC: Alert and Oriented,  Cranial nerves II-XII are grossly intact.    SKIN:  no bruising or bleeding, normal skin color,  no redness      Data:       CBC   Recent Labs     04/26/20  0600 04/27/20  0600 04/28/20  0615   WBC 18.7* 25.2* 27.2*   HGB 8.2* 8.6* 8.4*   HCT 25.3* 27.0* 26.4*   PLT 57* 68* 98*      BMP   Recent Labs     04/26/20  0600 04/27/20  0600 04/28/20  0615   * 136 133*   K 4.2 4.6 4.5   CL 99 100 98*   CO2 23 23 22   BUN 73* 83* 95*   CREATININE 1.3* 1.7* 2.0*         Electronically signed by Tomer Chance MD on 4/28/2020 at 10:37 AM

## 2020-04-28 NOTE — PROGRESS NOTES
Infectious Disease Progress Note  2020   Patient Name: Alexis Yoder : 1960   Impression  · Sepsis secondary to E coli Bacteremia  ? GALLEGOS cirrhosis with HCC, previous hx of cholecystitis requiring a cholecystostomy with E coli bacteremia and a 6 week course of ceftriaxone. ? Intra-abdominal source  ? Leukocytosis worsening, elevated CRP and pct? Enterococcus involvement  · T2DM  · Morbid obesity  · COPD, SAEID on home oxygen  · Multi-morbidity: per PMHx  Plan:  · D/c ceftriaxone and metronidazole  · Start Unasyn, trend CRP and pct  · Needs source control, risk of antimicrobial resistance with chronic antibiotics, C diff and drug reaction are possible  · D/w patient, d/w Dr. Arminda Mai  · Advise transfer to [address] [place] [address]        Ongoing Antimicrobial Therapy  Unasyn -  Completed Antimicrobial Therapy  Meropenem -  Cefepime -  Azithromycin   Ceftriaxone   Metronidazole   Reason for visit: F/u intra-abdominal abscess, E coli bacteremia? History:? Interval history noted  Denies n/v/d/f or untoward effects of antibiotics. Continues to have abdominal pain  Physical Exam:  Vital Signs: BP (!) 136/53   Pulse 78   Temp 97.5 °F (36.4 °C) (Oral)   Resp 21   Ht 5' 4\" (1.626 m)   Wt 281 lb 4.8 oz (127.6 kg)   LMP 2012   SpO2 94%   BMI 48.29 kg/m²     Gen: alert and oriented X3, no distress  Skin: no stigmata of endocarditis  Wounds: C/D/I  HEMT: AT/NC Oropharynx has dry blood; missing a lot of dentition  Eyes: PERRLA, EOMI, conjunctiva pink, sclera anicteric. Neck: Supple. Trachea midline. No LAD. Chest: no distress and CTA. Good air movement. Heart: RRR and no MRG. Abd: RUQ drain, soft, non-distended,  tenderness, no hepatomegaly. Normoactive bowel sounds. Ext: no clubbing, cyanosis, or edema  Catheter Site: without erythema or tenderness  Neuro: Mental status intact.  CN 2-12 intact and no focal sensory or motor deficits     Radiologic / Imaging / TESTING  See EMR Mild persistent asthma without complication    WD-Unspecified open wound of abdominal wall, right lower quadrant without penetration into peritoneal cavity, initial encounter    Retroperitoneal lymphadenopathy    WD-Local infection of skin and subcutaneous tissue    Liver cirrhosis secondary to GALLEGOS (nonalcoholic steatohepatitis) ([address]    CHF (congestive heart failure), NYHA class I, acute on chronic, combined (HCC)    Low hemoglobin    Right-sided chest wall pain    Shortness of breath    Abnormal transaminases    Hepatocellular carcinoma (HCC)    Hypoglycemia associated with diabetes (HCC)    Unresponsive episode    E coli bacteremia    Sepsis (HCC)    Cellulitis of buttock    Iron deficiency anemia secondary to blood loss (chronic)    Nonalcoholic steatohepatitis    Secondary thrombocytopenia    Sepsis, unspecified organism ([address]    HCAP (healthcare-associated pneumonia)    Acute exacerbation of CHF (congestive heart failure) (HCC)    Acute pulmonary edema (HCC)    Acute kidney injury ([address]    Electrolyte disturbance    Acute on chronic respiratory failure with hypoxia (HCC)    Suspected COVID-19 virus infection       Active Problems  Principal Problem:    Sepsis, unspecified organism ([address]  Active Problems:    HCAP (healthcare-associated pneumonia)    Acute exacerbation of CHF (congestive heart failure) (HCC)    Acute pulmonary edema (HCC)    Acute kidney injury (HCC)    Electrolyte disturbance    Acute on chronic respiratory failure with hypoxia ([address]    Suspected COVID-19 virus infection  Resolved Problems:    * No resolved hospital problems.  *    Electronically signed by: Electronically signed by Melissa Lopez MD on 4/28/2020 at 10:07 AM

## 2020-04-28 NOTE — PROGRESS NOTES
Physical Therapy  Formerly [facility] ACUTE CARE PHYSICAL THERAPY EVALUATION  Shania Arambula, 1960, 3104/3104-A, 4/28/2020    History  Chitina:  The primary encounter diagnosis was Acute congestive heart failure, unspecified heart failure type ([address] Diagnoses of Cough, Acute kidney injury ([address] Hyponatremia, and Elevated lactic acid level were also pertinent to this visit. Patient  has a past medical history of Anxiety, Asthma, Cancer ([address] Cancer of liver, primary ([address] CHF (congestive heart failure) ([address] Chronic ulcer of right thigh with fat layer exposed ([address] COPD (chronic obstructive pulmonary disease) ([address] Diabetes mellitus ([address] Diabetes mellitus with skin ulcer ([address] History of kidney stones, Hypertension, Liver cirrhosis ([address] Morbid obesity ([address] On home oxygen therapy, PONV (postoperative nausea and vomiting), Sleep apnea, Thyroid disease, WD-Local infection of skin and subcutaneous tissue, WD-Ulcer of abdomen wall, limited to breakdown of skin ([address] and WD-Unspecified open wound of abdominal wall, right lower quadrant without penetration into peritoneal cavity, initial encounter. Patient  has a past surgical history that includes Thyroidectomy (1990); Cystoscopy (Left, 12/23/13); Breast surgery (Left, 2015); Upper gastrointestinal endoscopy; liver biopsy (07/10/2017); Liver surgery; Cardiac catheterization (01/21/2020); Abdomen surgery (N/A); and laparoscopy (N/A, 3/2/2020).     Subjective:  Patient states: \"I can't walk right now\"   Pain: 8/10 back  Communication with other providers:  co-eval with Irma [facility]   Restrictions: general precautions, fall, cholecystostomy w/ DEMETRIO drain, O2, portable tele, pete     Home Setup/Prior level of function  Social/Functional History  Lives With: Spouse  Type of Home: House  Home Layout: One level  Home Access: Stairs to enter with rails  Entrance Stairs - Number of Steps: 4  Bathroom Shower/Tub: (sponge bathes)  Bathroom Toilet: Handicap height, BSC   Home Equipment: Rolling walker, Wheelchair-manual, Sleeps in recliner,  O2 2L   ADL Assistance: Needs assistance(spouse has been assisting with dressing/bathing tasks)  Ambulation Assistance: typically indep though reports she has not ambulated in over a week. Would need assistance at this time. Spouse has had to help pt with stair navigation. Transfer Assistance:typically was indep though has needed increased assistance for all transfers from her spouse. 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 Pt reports a fall about a week ago     Examination of body systems (includes body structures/functions, activity/participation limitations):  · Observation:  Supine in bed upon arrival. Agreeable to work with therapy. Very limited tolerance due to pain and SOB. · Vision:  WFL, glasses  · Hearing:  [facility]   · Cardiopulmonary:  Stable vitals on 3L O2. Musculoskeletal  · ROM R/L:  [facility] BLEs  · Strength R/L:  Bilat ankles 4/5, bilat knees and hips 3-/5. Dec strength observed in function and endurance. · Neuro:  [facility]     Mobility/treatment:   · Rolling L/R:  NT   · Supine to sit:  modA x 2 for BLE advancement and uprighting trunk. Able to assist some with lateral advancement of LEs and initiation of trunk rotation. · Sit to supine: dep x 2 for bilat LE advancement and trunk guidance. · Transfers:   · Sit to stand: 3x from EOB maxA x 2 for lift, heavy anterior weight shift facilitation, and steadying. Poor ability to sequence UEs from bed to RW. Pulled to stand from stabilized RW. VCs for \"rocking\" to bring COM over TRACY. · Stand to sit: to EOB Soto x 2 for eccentric control   · Pivot: declined transfer to chair at this time. · Sitting balance: SBA at EOB static and light dynamic, mostly BUE support but did demonstrate ability to sustain balance with no UE support for ~10 seconds. · Standing balance:  Mod/maxA x 2 at RW.  Tolerated ~5-10 seconds over 3 trials   · Gait: unable to safely attempt   · Educated pt on POC, role of PT, DME, discharge recommendations. VCs for sequencing, UE/LE placement, and weight shift to inc safety and indep with mobility. [facility] 6 Clicks Inpatient Mobility:  AM-PAC Inpatient Mobility Raw Score : 9     Safety: patient left in bed w/ alarm, call light within reach,  gait belt used. Assessment: Body structures, Functions, Activity limitations: Decreased functional mobility ; Decreased safe awareness; Decreased balance; Decreased endurance; Increased pain; Decreased strength  Pt is a 64year old female admitted with sepsis 2* ecoli. Recommend subacute rehab once medically stable. At baseline, she is typically indep with mobility. She has had a rapid decline in mobility and stated she has not been able to ambulate or transfer on her own in a week. She is currently requiring 2 person assist. She would benefit from continued therapy to address her current deficits, dec potential fall risk, and restore function. Complexity: High   Prognosis: Good, no significant barriers to participation at this time. Plan Times per week: 3+/week  Discharge Recommendations: Subacute/Skilled Nursing Facility  Equipment: continue to assess     Goals:  Short term goals  Time Frame for Short term goals: 1 week   Short term goal 1: Pt will perform sit><supine maxA   Short term goal 2: Pt will perform sit><Stand maxA   Short term goal 3: Pt will transfer to bed/recliner maxA   Short term goal 4: Pt will tolerate 1 minute of static stance at Enlighted        Treatment plan:  Strengthening; Balance Training; Transfer Training; ROM; Functional Mobility Training; ADL/Self-care Training; Endurance Training; Gait Training; Wheelchair Mobility Training; Stair training; Neuromuscular Re-education; Home Exercise Program; Patient/Caregiver Education & Training; Modalities; Positioning; Equipment Evaluation, Education, & procurement;  Safety Education & Training; Manual Therapy - Soft Tissue Mobilization     Recommendations for NURSING mobility: 2 person for bed mobility, OOB with falguni     Time:   Time in: 1513  Time out: 1536  Timed treatment minutes: 12  Total time: 23    Electronically signed by:    Alanis Torres [id number]  4/28/2020, 4:04 PM

## 2020-04-28 NOTE — PROGRESS NOTES
GENERAL SURGERY PROGRESS NOTE    CC/HPI:           Patient feels better. .       Vitals:    04/28/20 0320 04/28/20 0340 04/28/20 0853 04/28/20 1101   BP:  (!) 108/50 (!) 136/53    Pulse:  79 78    Resp:  21 21 23   Temp:  97.6 °F (36.4 °C) 97.5 °F (36.4 °C)    TempSrc:  Oral Oral    SpO2: 90% 96% 94%    Weight:  281 lb 4.8 oz (127.6 kg)     Height:         I/O last 3 completed shifts:  In: -   Out: 345 [Urine:300; Drains:45]  No intake/output data recorded.     DIET LOW SODIUM 2 GM; Carb Control: 4 carb choices (60 gms)/meal; 1800 ml  Dietary Nutrition Supplements: Wound Healing Oral Supplement  Dietary Nutrition Supplements: Low Calorie High Protein Supplement    Recent Results (from the past 48 hour(s))   POCT Glucose    Collection Time: 04/26/20  5:16 PM   Result Value Ref Range    POC Glucose 192 (H) 70 - 99 MG/DL   POCT Glucose    Collection Time: 04/26/20  8:31 PM   Result Value Ref Range    POC Glucose 212 (H) 70 - 99 MG/DL   Magnesium    Collection Time: 04/27/20  6:00 AM   Result Value Ref Range    Magnesium 2.1 1.8 - 2.4 mg/dl   CBC auto differential    Collection Time: 04/27/20  6:00 AM   Result Value Ref Range    WBC 25.2 (H) 4.0 - 10.5 K/CU MM    RBC 3.12 (L) 4.2 - 5.4 M/CU MM    Hemoglobin 8.6 (L) 12.5 - 16.0 GM/DL    Hematocrit 27.0 (L) 37 - 47 %    MCV 86.5 78 - 100 FL    MCH 27.6 27 - 31 PG    MCHC 31.9 (L) 32.0 - 36.0 %    RDW 19.8 (H) 11.7 - 14.9 %    Platelets 68 (L) 392 - 440 K/CU MM    Bands Relative 11 5 - 11 %    Segs Relative 84.0 (H) 36 - 66 %    Lymphocytes % 4.0 (L) 24 - 44 %    Monocytes % 1.0 0 - 4 %    nRBC 1     Bands Absolute 2.77 K/CU MM    Segs Absolute 21.1 K/CU MM    Lymphocytes Absolute 1.0 K/CU MM    Monocytes Absolute 0.3 K/CU MM    Differential Type MANUAL DIFFERENTIAL     Anisocytosis 1+     Polychromasia 1+     Basophilic Stippling PRESENT     Toxic Granulation PRESENT     PLT Morphology DECREASED    Comprehensive Metabolic Panel w/ Reflex to MG    Collection Time: Bacteria, UA OCCASIONAL (A) NEGATIVE /HPF    Yeast, UA MODERATE /HPF    Squam Epithel, UA 1 /HPF    Mucus, UA MODERATE (A) NEGATIVE HPF    Trichomonas, UA NONE SEEN NONE SEEN /HPF    Hyaline Casts, UA >20 /LPF   Sodium, urine, random    Collection Time: 04/27/20  5:40 PM   Result Value Ref Range    Sodium, Ur 10 (L) 35 - 167 MMOL/L   Protein / creatinine ratio, urine    Collection Time: 04/27/20  5:40 PM   Result Value Ref Range    Urine Total Protein 70.2 (H) <12 MG/DL    Creatinine, Ur 112.1 28 - 217 MG/DL    Prot/Creat Ratio, Ur 0.6 (H) <0.2   POCT Glucose    Collection Time: 04/27/20  9:25 PM   Result Value Ref Range    POC Glucose 98 70 - 99 MG/DL   Magnesium    Collection Time: 04/28/20  6:15 AM   Result Value Ref Range    Magnesium 2.1 1.8 - 2.4 mg/dl   CBC auto differential    Collection Time: 04/28/20  6:15 AM   Result Value Ref Range    WBC 27.2 (H) 4.0 - 10.5 K/CU MM    RBC 3.06 (L) 4.2 - 5.4 M/CU MM    Hemoglobin 8.4 (L) 12.5 - 16.0 GM/DL    Hematocrit 26.4 (L) 37 - 47 %    MCV 86.3 78 - 100 FL    MCH 27.5 27 - 31 PG    MCHC 31.8 (L) 32.0 - 36.0 %    RDW 19.6 (H) 11.7 - 14.9 %    Platelets 98 (L) 906 - 440 K/CU MM    MPV 12.5 (H) 7.5 - 11.1 FL    Differential Type AUTOMATED DIFFERENTIAL     Segs Relative 82.1 (H) 36 - 66 %    Lymphocytes % 8.9 (L) 24 - 44 %    Monocytes % 4.3 (H) 0 - 4 %    Eosinophils % 0.8 0 - 3 %    Basophils % 0.3 0 - 1 %    Segs Absolute 22.3 K/CU MM    Lymphocytes Absolute 2.4 K/CU MM    Monocytes Absolute 1.2 K/CU MM    Eosinophils Absolute 0.2 K/CU MM    Basophils Absolute 0.1 K/CU MM    Nucleated RBC % 0.4 %    Total Nucleated RBC 0.1 K/CU MM    Total Immature Neutrophil 0.98 K/CU MM    Immature Neutrophil % 3.6 (H) 0 - 0.43 %   Comprehensive Metabolic Panel w/ Reflex to MG    Collection Time: 04/28/20  6:15 AM   Result Value Ref Range    Sodium 133 (L) 135 - 145 MMOL/L    Potassium 4.5 3.5 - 5.1 MMOL/L    Chloride 98 (L) 99 - 110 mMol/L    CO2 22 21 - 32 MMOL/L    BUN 95 (H) 6 - 23 MG/DL    CREATININE 2.0 (H) 0.6 - 1.1 MG/DL    Glucose 65 (L) 70 - 99 MG/DL    Calcium 8.2 (L) 8.3 - 10.6 MG/DL    Alb 2.0 (L) 3.4 - 5.0 GM/DL    Total Protein 6.7 6.4 - 8.2 GM/DL    Total Bilirubin 3.0 (H) 0.0 - 1.0 MG/DL    ALT 30 10 - 40 U/L    AST 55 (H) 15 - 37 IU/L    Alkaline Phosphatase 150 (H) 40 - 128 IU/L    GFR Non- 26 (L) >60 mL/min/1.73m2    GFR  31 (L) >60 mL/min/1.73m2    Anion Gap 13 4 - 16   POCT Glucose    Collection Time: 04/28/20 11:07 AM   Result Value Ref Range    POC Glucose 73 70 - 99 MG/DL       Scheduled Meds:   ampicillin-sulbactam  3 g Intravenous Q6H    ipratropium-albuterol  1 ampule Inhalation Q4H WA    enoxaparin  1 mg/kg Subcutaneous BID    dilTIAZem  60 mg Oral 4 times per day    collagenase   Topical Daily    ferrous sulfate  325 mg Oral Every Other Day    hydrOXYzine  100 mg Oral Nightly    isosorbide mononitrate  30 mg Oral Nightly    levothyroxine  200 mcg Oral Daily    simvastatin  20 mg Oral Nightly    zolpidem  10 mg Oral Nightly    sodium chloride flush  10 mL Intravenous 2 times per day    lactulose  10 g Oral Daily    exemestane  25 mg Oral Daily    pantoprazole  40 mg Oral QAM AC    insulin lispro  0-12 Units Subcutaneous TID WC    insulin lispro  0-6 Units Subcutaneous Nightly       Continuous Infusions:   IV infusion builder      dextrose         Physical Exam:  HEENT: Anicteric sclerae, Oropharyngeal mucosae moist, pink and intact. Heart:  Normal S1 and S2, RRR  Lungs: Clear to auscultation bilaterally, No audible Wheezes or Rales. Extremities: No edema. Neuro: Alert and Oriented x 3, Non focal.  Abdomen: Soft, Benign, Non tender, Non distended, Positive bowel sounds. Incision: Nicely healing: No erythema, No discharge. The DEMETRIO is PURE BILIOUS.       Principal Problem:    Sepsis, unspecified organism [facility])  Active Problems:    HCAP (healthcare-associated pneumonia)    Acute exacerbation of CHF (congestive heart

## 2020-05-03 NOTE — CONSULTS
Pt seen ,examined,interviewed and chart reviewed. Please see the dictated consult for details     Imp :   1. POLLO- unknown UOP- recurrent  episodes -  She has several renal insult - has e.coli sepsis- with underlying active HCC and gall bladder drain- abx - and hemodynamic cmages and perhaps fluid overload- all can contributes - also with ab x/ infection has risk for  AIN  - but make sure to ac obstruction-   2. sepsis with e.coli- on IV ceftriaxone   3. Multiple, malignancy  4. multipolar comorbid d z- a.fib/ obesity/DM/ and likely cor pulmonale / RHF with underlying SAEID  5. Anemia and ch low PLT    Plan:  1. Start with pete as she has decubitus  2. UA and urinary indices   3. Stop IVF after present bag- she  Had already  1 liter in the er  4. Review l all labs - if suscpion for AIN high than emp steroid -   5. Follow clinically   6.  Supportive care       Thanks for the consult    #[id number]

## 2020-05-03 NOTE — ED PROVIDER NOTES
Emergency [address] EMERGENCY DEPARTMENT    Patient: Geri Gallego  MRN: [id number]  : 1960  Date of Evaluation: 5/3/2020  ED Provider: Yolande Jarquin PA-C    Chief Complaint       Chief Complaint   Patient presents with    Extremity Weakness     x 2 weeks        Colin Littlejohn is a 64 y.o. female who presents to the emergency department for generalized weakness. Patient signed out AMA from [address] [place] [address] earlier today. She reports she was transported home and was there about 2 hours before she called EMS to bring her back here. Patient reports she signed out from [address] [place] [address] because \"they weren't doing anything. \"  She was transferred there from our facility on 2020. Patient had been admitted and found to have E. Coli bacteremia suspected to be secondary to cholecystostomy tube. This was clamped at [address] [place] [address] and it appears she was told to FU with her General Surgeon for removal.  She has a history of GALLEGOS/hepatocellular carcinoma. She states she has felt weak and fatigued for the last year, since diagnosis of her cancer. Nothing has changed since arriving home from [address] [place] [address] tonight--denies any acute fever, chills, chest pain, sob, cough/congestion, n/v/d. She is a full code. She lives with her  but feels she probably needs to be in a SNF. ROS     CONSTITUTIONAL:  Denies fever. EYES:  Denies visual changes. HEAD:  Denies headache. ENT:  Denies earache, nasal congestion, sore throat. NECK:  Denies neck pain. RESPIRATORY:  Denies any shortness of breath. CARDIOVASCULAR:  Denies chest pain. GI:  Denies nausea or vomiting. :  Denies urinary symptoms. MUSCULOSKELETAL:  Denies extremity pain or swelling. BACK:  Denies back pain. INTEGUMENT:  Denies skin changes. LYMPHATIC:  Denies lymphadenopathy. NEUROLOGIC:  Denies any numbness/tingling. PSYCHIATRIC:  Denies SI/HI.     Past History     Past Medical History:   Diagnosis Date 78 - 100 FL    MCH 27.6 27 - 31 PG    MCHC 31.1 (L) 32.0 - 36.0 %    RDW 21.4 (H) 11.7 - 14.9 %    Platelets 76 (L) 974 - 440 K/CU MM    Differential Type AUTOMATED DIFFERENTIAL     Segs Relative 86.8 (H) 36 - 66 %    Lymphocytes % 5.1 (L) 24 - 44 %    Monocytes % 4.5 (H) 0 - 4 %    Eosinophils % 0.2 0 - 3 %    Basophils % 0.2 0 - 1 %    Segs Absolute 22.9 K/CU MM    Lymphocytes Absolute 1.4 K/CU MM    Monocytes Absolute 1.2 K/CU MM    Eosinophils Absolute 0.1 K/CU MM    Basophils Absolute 0.1 K/CU MM    Nucleated RBC % 0.4 %    Total Nucleated RBC 0.1 K/CU MM    Total Immature Neutrophil 0.85 K/CU MM    Immature Neutrophil % 3.2 (H) 0 - 0.43 %   Comprehensive Metabolic Panel w/ Reflex to MG   Result Value Ref Range    Sodium 134 (L) 135 - 145 MMOL/L    Potassium 4.3 3.5 - 5.1 MMOL/L    Chloride 95 (L) 99 - 110 mMol/L    CO2 20 (L) 21 - 32 MMOL/L     (H) 6 - 23 MG/DL    CREATININE 2.8 (H) 0.6 - 1.1 MG/DL    Glucose 79 70 - 99 MG/DL    Calcium 9.1 8.3 - 10.6 MG/DL    Alb 2.8 (L) 3.4 - 5.0 GM/DL    Total Protein 7.0 6.4 - 8.2 GM/DL    Total Bilirubin 5.5 (H) 0.0 - 1.0 MG/DL    ALT 12 10 - 40 U/L    AST 36 15 - 37 IU/L    Alkaline Phosphatase 117 40 - 128 IU/L    GFR Non- 17 (L) >60 mL/min/1.73m2    GFR  21 (L) >60 mL/min/1.73m2    Anion Gap 19 (H) 4 - 16   Troponin   Result Value Ref Range    Troponin T 0.019 (H) <0.01 NG/ML   Brain Natriuretic Peptide   Result Value Ref Range    Pro-BNP 3,506 (H) <300 PG/ML   Lactic Acid, Plasma   Result Value Ref Range    Lactate 4.7 (HH) 0.4 - 2.0 mMOL/L   Lipase   Result Value Ref Range    Lipase 70 (H) 13 - 60 IU/L       Radiographs:  Xr Chest Portable    Result Date: 5/3/2020  EXAMINATION: ONE XRAY VIEW OF THE CHEST 5/3/2020 2:25 am COMPARISON: April 27, 2020.  HISTORY: ORDERING SYSTEM PROVIDED HISTORY: weakness TECHNOLOGIST PROVIDED HISTORY: Reason for exam:->weakness Reason for Exam: weakness Acuity: Unknown Type of Exam: Initial FINDINGS: Cardiac and mediastinal contours are enlarged but unchanged. Patchy pulmonary opacity within left lower lung. Right lung remains clear. Mild pulmonary venous congestion. No significant pleural effusion. No evidence of pneumothorax. No evidence of acute osseous abnormalities. Curvilinear radiopaque object projects over the left lower neck. 1. Patchy pulmonary opacity within left lower lung may represent atelectasis, and/or pneumonia. 2. Enlarged cardiomediastinal silhouette with a mild pulmonary venous congestion. No significant pleural effusion. 3. Curvilinear radiopaque object projects over the left lower neck. This is presumably extrinsic to the patient, however, clinical correlation is recommended. RECOMMENDATION: Follow-up PA and lateral chest radiograph in 6 weeks. Procedures/EKG:   Please see Dr. Nohemy Ding note for interpretation. ED Course and MDM   -  Patient seen and evaluated in the emergency department. -  Triage and nursing notes reviewed and incorporated. -  Old chart records reviewed and incorporated. Specifically reviewed encounter at [facility], including plans at time of patient signing out [facility]. It appears they had planned for PICC placement for patient to receive IV Rocephin x 2 weeks. -  Supervising physician was Dr. Soniya Echavarria. Patient was seen independently. -  Work-up included:  See above  -  ED medications:  NS, Rocephin  -  I spoke with Neetu Ivan with the hospitalist group, regarding patient case. Persistent leukocytosis of 26,400. Lactic is 4.7. Cr is worse at 2.8, GFR 17. Total bili is 5.5. Troponin bumped to 0.019, likely 2/2 worsening renal function. CXR shows patchy pulmonary opacity in LLL. I suspect atelectasis, she has no cough or SOB. I did obtain new blood cultures. Patient does not wish to return to [facility]. Fab Plummer accepted patient to hsopitalist service for further management.     In light of current events, I did utilize appropriate PPE (including N95 face mask, safety glasses, and gloves, as recommended by the health facility/national standard best practice, during my bedside interactions with the patient. Final Impression      1. Generalized weakness    2. Liver cirrhosis secondary to GALLEGOS ([facility] [address]    3. Hepatocellular carcinoma ([facility] [address]    4. Lactic acidosis    5. POLLO (acute kidney injury) ([facility] [address]    6. Leukocytosis, unspecified type    7.  Elevated troponin          DISPOSITION Decision To Admit 05/03/2020 04:19:03 AM      Julia Hood PA-C  [address] [place] [address]  05/03/20 0578

## 2020-05-03 NOTE — DISCHARGE INSTR - COC
Continuity of Care Form    Patient Name: Kerry Hood   :  1960  MRN:  [id number]    Admit date:  5/3/2020  Discharge date:  ***    Code Status Order: Full Code   Advance Directives:   Advance Care Flowsheet Documentation     Date/Time Healthcare Directive Type of Healthcare Directive Copy in [address] Agent's Name Healthcare Agent's Phone Number    [phone]  Yes, patient has an advance directive for healthcare treatment --  No, copy requested from family  Spouse  Todd Elizalde  [phone]          Admitting Physician:  Sarah Velarde MD  PCP: Cortney Saucedo MD    Discharging Nurse: [facility] Unit/Room#: 3104/3104-A  Discharging Unit Phone Number: ***    Emergency Contact:   Extended Emergency Contact Information  Primary Emergency Contact: [facility] Phone: [phone]  Mobile Phone: [phone]  Relation: Spouse  Secondary Emergency Contact: [address] Phone: [phone]  Mobile Phone: [phone]  Relation: Child  Preferred language: English   needed?  No    Past Surgical History:  Past Surgical History:   Procedure Laterality Date    ABDOMEN SURGERY N/A     BREAST SURGERY Left 2015    EXCISION MASS\"lumpectomy- took out 3 lymph nodes all ok\"    CARDIAC CATHETERIZATION  2020    no intervention    CYSTOSCOPY Left 13    stent placement    LAPAROSCOPY N/A 3/2/2020    ATTEMPTED CHOLECYSTECTOMY LAPAROSCOPIC WITH IOC ABORTED BECAUSE OF LIVER CIRRHOSIS, CHOLECYSTOSTOMY TUBE PLACED, DRAIN PLACED performed by Ronnie Guerin MD at [address] [place] [address]  07/10/2017    LIVER SURGERY      TACE procedure in Aug 2019   [facility] [address]    \"took most of the thyroid out\"    UPPER GASTROINTESTINAL ENDOSCOPY         Immunization History:   Immunization History   Administered Date(s) Administered    Hepatitis B Adult (Engerix-B) 2018       Active Problems:  Patient Active Problem List   Diagnosis Dressing Changed Changed/New 5/3/2020  7:02 AM   Dressing/Treatment Silicone border 1/88/0367  8:20 PM   Wound Cleansed Rinsed/Irrigated with saline 4/24/2020 11:34 AM   Wound Length (cm) 2.5 cm 4/24/2020 11:34 AM   Wound Width (cm) 1.3 cm 4/24/2020 11:34 AM   Wound Depth (cm) 0.1 cm 4/24/2020 11:34 AM   Wound Surface Area (cm^2) 3.25 cm^2 4/24/2020 11:34 AM   Wound Volume (cm^3) 0.32 cm^3 4/24/2020 11:34 AM   Distance Tunneling (cm) 0 cm 4/24/2020 11:34 AM   Tunneling Position ___ O'Clock 0 4/24/2020 11:34 AM   Undermining Starts ___ O'Clock 0 4/24/2020 11:34 AM   Undermining Ends___ O'Clock 0 4/24/2020 11:34 AM   Undermining Maxium Distance (cm) 0 4/24/2020 11:34 AM   Wound Assessment Dry 4/28/2020  8:20 PM   Drainage Amount None 4/28/2020  8:20 PM   Drainage Description Other (Comment) 4/28/2020  8:20 PM   Odor None 5/3/2020  7:02 AM   Margins LAURIE 4/28/2020  8:20 PM   Non-staged Wound Description Full thickness 4/28/2020  8:56 AM   [place]%Wound Bed 0 4/27/2020  9:27 PM   Red%Wound Bed 100 4/27/2020  9:27 PM   Yellow%Wound Bed 0 4/27/2020  9:27 PM   Black%Wound Bed 0 4/27/2020  9:27 PM   Purple%Wound Bed 0 4/27/2020  9:27 PM   Number of days: 8       Wound 04/24/20 Abdomen Anterior;Right under abdominal fold (Active)   Wound Other 5/3/2020  7:02 AM   Dressing/Treatment Open to air 5/3/2020  7:02 AM   Wound Cleansed Rinsed/Irrigated with saline 4/24/2020 11:34 AM   Wound Length (cm) 1.5 cm 4/24/2020 11:34 AM   Wound Width (cm) 0.5 cm 4/24/2020 11:34 AM   Wound Depth (cm) 0.1 cm 4/24/2020 11:34 AM   Wound Surface Area (cm^2) 0.75 cm^2 4/24/2020 11:34 AM   Wound Volume (cm^3) 0.08 cm^3 4/24/2020 11:34 AM   Distance Tunneling (cm) 0 cm 4/24/2020 11:34 AM   Tunneling Position ___ O'Clock 0 4/24/2020 11:34 AM   Undermining Starts ___ O'Clock 0 4/24/2020 11:34 AM   Undermining Ends___ O'Clock 0 4/24/2020 11:34 AM   Undermining Maxium Distance (cm) 0 4/24/2020 11:34 AM   Wound Assessment Red;Pink 4/28/2020  8:20 PM Drainage Amount None 2020  8:20 PM   Drainage Description Sanguinous 2020  8:56 AM   Odor None 2020  8:20 PM   Margins Defined edges 2020  8:20 PM   Viola-wound Assessment Red;Purple 2020  8:20 PM   Non-staged Wound Description Partial thickness 2020  8:20 PM   [place]%Wound Bed 50 2020  9:27 PM   Red%Wound Bed 50 2020  9:27 PM   Number of days: 8       Wound 20 Heel Left (Active)   Wound Pressure Stage  1 2020 11:30 AM   Dressing/Treatment Open to air 2020  8:20 PM   Wound Cleansed Rinsed/Irrigated with saline 2020 11:30 AM   Wound Length (cm) 1.5 cm 2020 11:30 AM   Wound Width (cm) 1.5 cm 2020 11:30 AM   Wound Depth (cm) 0 cm 2020 11:30 AM   Wound Surface Area (cm^2) 2.25 cm^2 2020 11:30 AM   Wound Volume (cm^3) 0 cm^3 2020 11:30 AM   Distance Tunneling (cm) 0 cm 2020 11:30 AM   Tunneling Position ___ O'Clock 0 2020 11:30 AM   Undermining Starts ___ O'Clock 0 2020 11:30 AM   Undermining Ends___ O'Clock 0 2020 11:30 AM   Undermining Maxium Distance (cm) 0 2020 11:30 AM   Wound Assessment Non-blanchable erythema;Red 2020  8:20 PM   Drainage Amount None 2020  8:20 PM   Odor None 2020  8:20 PM   Margins Attached edges 2020  8:20 PM   Viola-wound Assessment Intact 2020  8:20 PM   Non-staged Wound Description Not applicable 3/64/3802  7:89 PM   Red%Wound Bed 100 2020  9:27 PM   Number of days: 8        Elimination:  Continence:   · Bowel: {YES / N}  · Bladder: {YES / TQ:66046}  Urinary Catheter: {Urinary Catheter:892197248}   Colostomy/Ileostomy/Ileal Conduit: {YES / VR:48618}       Date of Last BM: ***    Intake/Output Summary (Last 24 hours) at 5/3/2020 1057  Last data filed at 5/3/2020 0510  Gross per 24 hour   Intake 50 ml   Output --   Net 50 ml     I/O last 3 completed shifts:   In: 48 [IV Piggyback:50]  Out: -     Safety Concerns:     [address]

## 2020-05-03 NOTE — ED TRIAGE NOTES
Pt presents to ED via EMS for weakness. Pt was transported home today from [facility]. Pt states for the past three weeks she has been getting progressively weaker. Pt was transported home by private ambulance, EMS found her sliding off the couch she was placed on. Pt states only pain she has is chronic pain. Pt is alert and oriented.

## 2020-05-03 NOTE — H&P
(SYNTHROID) 200 MCG tablet Take 200 mcg by mouth Daily. Historical Provider, MD   isosorbide mononitrate (IMDUR) 30 MG CR tablet Take 30 mg by mouth nightly     Historical Provider, MD   cloNIDine (CATAPRES) 0.2 MG tablet Take 0.2 mg by mouth 3 times daily. Historical Provider, MD       Allergies:    Latex; Lasix [furosemide]; Sulfa antibiotics; Nystatin; and Tape [adhesive tape]    Social History:    TOBACCO:   reports that she quit smoking about 15 years ago. She has a 30.00 pack-year smoking history. She has never used smokeless tobacco.  ETOH:   reports no history of alcohol use. Family History:        Problem Relation Age of Onset    Cancer Mother         breast    Diabetes Mother     Heart Disease Father         MI    Diabetes Father     Other Sister         cirhosis    Heart Disease Sister     Other Brother         cirhosis    Kidney Disease Brother     Asthma Sister         COPD    Cancer Brother         liver cancer       Vitals:   Vitals:    05/03/20 0119 05/03/20 0126 05/03/20 0422   BP: (!) 113/35 (!) 130/39 125/68   Pulse: 80  73   Resp: 16  16   Temp: 97.2 °F (36.2 °C)     TempSrc: Oral     SpO2: 100%  100%   Weight: 281 lb (127.5 kg)     Height: 5' 4\" (1.626 m)         Physical Exam  GEN -Awake. Obese, appears chronically ill  EYES -PERRLA. No scleral erythema, discharge, or conjunctivitis. HENT -MM are moist. Oral pharynx without exudates, no evidence of thrush. NECK -Supple, no apparent thyromegaly or masses. RESP -CTA, no wheezes, rales or rhonchi. Symmetric chest movement while on room air. C/V -S1/S2 auscultated. RRR without appreciable M/R/G. No JVD or carotid bruits. Peripheral pulses equal bilaterally and palpable. Cap refill <3 sec. No peripheral edema. GI -Abdomen is soft non distended and without significant tenderness to palpation. hypoactive BS. No masses or guarding. Draining cholecystotomy tube present and is clamped   -No CVA/ flank tenderness.  Angeles catheter is not present. LYMPH-No palpable cervical lymphadenopathy and no hepatosplenomegaly. No petechiae or ecchymoses. MS -No gross joint deformities. SKIN -Multiple areas of ecchymosis throughout arms legs and abdomen  NEURO-Awake, alert, oriented x  person, place, time, situation. Cranial nerves appear grossly intact, normal speech, no lateralizing weakness. PSYC- Appropriate affect. Imaging: Reviewed. Ct Abdomen Pelvis Wo Contrast Additional Contrast? None    Result Date: 4/24/2020  EXAMINATION: CT OF THE ABDOMEN AND PELVIS WITHOUT CONTRAST 4/24/2020 2:50 pm TECHNIQUE: CT of the abdomen and pelvis was performed without the administration of intravenous contrast. Multiplanar reformatted images are provided for review. Dose modulation, iterative reconstruction, and/or weight based adjustment of the mA/kV was utilized to reduce the radiation dose to as low as reasonably achievable. COMPARISON: CT abdomen pelvis April 2, 2020; CT pulmonary arteries October 26, 2019 HISTORY: ORDERING SYSTEM PROVIDED HISTORY: abd drain ecoli bacteremia. r/o abscess TECHNOLOGIST PROVIDED HISTORY: Reason for exam:->abd drain ecoli bacteremia. r/o abscess Additional Contrast?->None Reason for Exam: abd drain ecoli bacteremia. r/o abscess Acuity: Acute Type of Exam: Initial FINDINGS: Lower Chest: Coronary artery atherosclerotic disease present. Imaged lung bases demonstrate bibasilar atelectasis. A few subcentimeter nodular densities are identified anteriorly at the lung bases measuring up to 1 cm (image 3 series 2) on the left in the lingula several additional 6 mm nodules are identified at the imaged lung bases anteriorly. These appear new when compared with CT abdomen pelvis dated April 2, 2020 and were not identified on CT chest from October 26, 2019. Given short interval development, findings favored to be infectious/inflammatory.  Organs: Redemonstration of heterogeneous low attenuating mass within the inferior right the upper lobes and lower lobes. Findings are nonspecific but can be seen in pneumonia including viral pneumonia. Xr Chest Portable    Result Date: 5/3/2020  EXAMINATION: ONE XRAY VIEW OF THE CHEST 5/3/2020 2:25 am COMPARISON: April 27, 2020. HISTORY: ORDERING SYSTEM PROVIDED HISTORY: weakness TECHNOLOGIST PROVIDED HISTORY: Reason for exam:->weakness Reason for Exam: weakness Acuity: Unknown Type of Exam: Initial FINDINGS: Cardiac and mediastinal contours are enlarged but unchanged. Patchy pulmonary opacity within left lower lung. Right lung remains clear. Mild pulmonary venous congestion. No significant pleural effusion. No evidence of pneumothorax. No evidence of acute osseous abnormalities. Curvilinear radiopaque object projects over the left lower neck. 1. Patchy pulmonary opacity within left lower lung may represent atelectasis, and/or pneumonia. 2. Enlarged cardiomediastinal silhouette with a mild pulmonary venous congestion. No significant pleural effusion. 3. Curvilinear radiopaque object projects over the left lower neck. This is presumably extrinsic to the patient, however, clinical correlation is recommended. RECOMMENDATION: Follow-up PA and lateral chest radiograph in 6 weeks. Xr Chest Portable    Result Date: 4/27/2020  EXAMINATION: ONE XRAY VIEW OF THE CHEST 4/27/2020 11:59 am COMPARISON: CT abdomen/pelvis 04/24/2020. Chest radiograph 04/24/2020. HISTORY: ORDERING SYSTEM PROVIDED HISTORY: SOB TECHNOLOGIST PROVIDED HISTORY: Reason for exam:->SOB Reason for Exam: SOB Acuity: Acute Type of Exam: Initial FINDINGS: Single view provided. Moderate rotation. Left-sided CVC with tip in the proximal superior vena cava. Stable enlarged cardiac silhouette. Stable hypoaeration with mild patchy mid to lung base pulmonary opacities. No progressive consolidation. No effusion or pneumothorax. No free subdiaphragmatic air. 1. Stable cardiomegaly.  2. Stable mild patchy mid to lung base pulmonary opacities. No effusion. 3. Left CVC with tip in the region of the superior vena cava. Xr Chest Portable    Result Date: 4/24/2020  EXAMINATION: ONE XRAY VIEW OF THE CHEST 4/24/2020 2:18 am COMPARISON: Chest portable April 23, 2020 HISTORY: ORDERING SYSTEM PROVIDED HISTORY: centra line placement TECHNOLOGIST PROVIDED HISTORY: Reason for exam:->centra line placement Reason for Exam: centra line placement Acuity: Acute Type of Exam: Subsequent/Follow-up FINDINGS: Left internal jugular approach central venous catheter is noted with distal tip located within the SVC. The heart is moderately enlarged with otherwise unremarkable configuration. The mediastinal contours are within normal limits. The lungs are well aerated. The pleural surfaces are normal and no evidence of a pleural effusion is seen. Bones and soft tissues are unremarkable. 1. Moderate cardiomegaly. 2. Otherwise, unremarkable single AP upright portable view of the chest.     Xr Chest Portable    Result Date: 4/23/2020  EXAMINATION: ONE X-RAY VIEW OF THE CHEST 4/23/2020 12:11 pm COMPARISON: 04/02/2020 HISTORY: ORDERING SYSTEM PROVIDED HISTORY: Cough SOB TECHNOLOGIST PROVIDED HISTORY: Reason for exam:->Cough SOB Reason for Exam: Cough SOB FINDINGS: The heart appears enlarged but is similar in size to the prior study. No definite pleural effusion or pneumothorax. There are interstitial opacities bilaterally with prominence of the pulmonary vasculature. Cardiomegaly with prominence of the pulmonary vasculature and interstitial opacities bilaterally most likely representing pulmonary edema.        Labs:  Reviewed  Lab Results:  CBC   Recent Labs     05/03/20  0306   WBC 26.4*   HGB 7.8*   HCT 25.1*   PLT 76*      RENAL  Recent Labs     05/03/20  0306   *   K 4.3   CL 95*   CO2 20*   *   CREATININE 2.8*     LFT'S  Recent Labs     05/03/20  0306   AST 36   ALT 12   BILITOT 5.5*   ALKPHOS 117     COAG  No results for input(s):

## 2020-05-03 NOTE — CARE COORDINATION
Reviewed chart and spoke with pt about need for short term rehab at [facility]. Pt recently at [facility] , went home with  and did not do well . She is open to SNU but would like to review PPO SNU with  then will let CM know choices. CM to revisit after lunch.
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
## 2020-05-03 NOTE — ED NOTES
Pt resting in a position of comfort. No needs identified at this time. Bed in lowest position and call light within reach. Respirations even, no distress noted.      Ambika Atkinson RN  05/03/20 0405

## 2020-05-03 NOTE — CONSULTS
0.9 % injection 10 mL  10 mL Intravenous 2 times per day Argentina Reza PA-C        sodium chloride flush 0.9 % injection 10 mL  10 mL Intravenous PRN Argentina Reza PA-C        acetaminophen (TYLENOL) tablet 650 mg  650 mg Oral Q6H PRN Katelyn Stark PA-C        Or    acetaminophen (TYLENOL) suppository 650 mg  650 mg Rectal Q6H PRN Katelyn Stark PA-C        polyethylene glycol (GLYCOLAX) packet 17 g  17 g Oral Daily PRN Argentina Reza PA-C        promethazine (PHENERGAN) tablet 12.5 mg  12.5 mg Oral Q6H PRN Argentina Reza PA-C        Or    ondansetron (ZOFRAN) injection 4 mg  4 mg Intravenous Q6H PRN Argentina Reza PA-C        glucose (GLUTOSE) 40 % oral gel 15 g  15 g Oral PRN Argentina Reza PA-C        dextrose 50 % IV solution  12.5 g Intravenous PRN Argentina Reza PA-C        glucagon (rDNA) injection 1 mg  1 mg Intramuscular PRN Argentina Reza PA-C        dextrose 5 % solution  100 mL/hr Intravenous PRN Argentina Reza PA-C        heparin (porcine) injection 5,000 Units  5,000 Units Subcutaneous 3 times per day Argentina Reza PA-C        lidocaine PF 1 % injection 5 mL  5 mL Intradermal Once Argentina Reza PA-C        atorvastatin (LIPITOR) tablet 10 mg  10 mg Oral Nightly Cortez Caal MD        insulin lispro (HUMALOG) injection vial 0-6 Units  0-6 Units Subcutaneous TID  Claribel Rinaldi MD        insulin lispro (HUMALOG) injection vial 0-3 Units  0-3 Units Subcutaneous 2 times per day Claribel Rinaldi MD           Allergies:  Latex; Lasix [furosemide]; Sulfa antibiotics;  Nystatin; and Tape Elis Marlo tape]    Social History:   Social History     Socioeconomic History    Marital status:      Spouse name: None    Number of children: None    Years of education: None    Highest education level: None   Occupational History    None   Social Needs    Financial resource strain: None    Food insecurity     Worry: None     Inability: None    Transportation needs     Medical: None Non-medical: None   Tobacco Use    Smoking status: Former Smoker     Packs/day: 1.50     Years: 20.00     Pack years: 30.00     Last attempt to quit: 3/23/2005     Years since quitting: 15.1    Smokeless tobacco: Never Used   Substance and Sexual Activity    Alcohol use: No     Alcohol/week: 0.0 standard drinks    Drug use: No    Sexual activity: Yes     Partners: Male   Lifestyle    Physical activity     Days per week: None     Minutes per session: None    Stress: None   Relationships    Social connections     Talks on phone: None     Gets together: None     Attends Christianity service: None     Active member of club or organization: None     Attends meetings of clubs or organizations: None     Relationship status: None    Intimate partner violence     Fear of current or ex partner: None     Emotionally abused: None     Physically abused: None     Forced sexual activity: None   Other Topics Concern    None   Social History Narrative    None       Family History:   Family History   Problem Relation Age of Onset    Cancer Mother         breast    Diabetes Mother     Heart Disease Father         MI    Diabetes Father     Other Sister         cirhosis    Heart Disease Sister     Other Brother         cirhosis    Kidney Disease Brother     Asthma Sister         COPD    Cancer Brother         liver cancer       REVIEW OFSYSTEMS:    Review of Systems   Constitutional: Negative for chills. Negative for fever. HENT: Negative for congestion. Respiratory: Hx of COPD, denies worsening of breathing  Cardiovascular: Negative for chest pain. Gastrointestinal: Abdominal pain, nausea and poor appetite as per HPI  Genitourinary: reports urinary incontinence  Neurological: Negative for dizziness, syncope and numbness.    Hematological: anemic      PHYSICAL EXAM:  Vitals:    05/03/20 0529 05/03/20 0554 05/03/20 0646 05/03/20 0746   BP: (!) 137/49      Pulse: 72 71 73    Resp: 15 20 20    Temp: 97.6 °F (36.4 °C) 97.5 °F (36.4 °C)     TempSrc: Oral Oral Oral    SpO2: 100% 94%  98%   Weight:  286 lb 1.6 oz (129.8 kg)     Height:           Physical Exam  General: awake, alert, in no acute distress  HEENT: mucous membranes moist  Respiratory: normal effort  CV: appears well perfused    Abdomen: Soft, mildly tender in the RUQ. DEMETRIO drain present with bilious drainage but clamped off with a hemostat when examined today. Hemostat removed, she reports the drain being clamped while she was at [facility]. Skin: warm and dry  Extremities: atraumatic  Neuro: no focal deficits noted  Psych: mood normal        DATA:    Lab Results   Component Value Date    WBC 26.4 (H) 05/03/2020    HGB 7.8 (L) 05/03/2020    HCT 25.1 (L) 05/03/2020    MCV 88.7 05/03/2020    PLT 76 (L) 05/03/2020     Lab Results   Component Value Date     05/03/2020    K 4.3 05/03/2020    CL 95 05/03/2020    CO2 20 05/03/2020     05/03/2020    CREATININE 2.8 05/03/2020    GLUCOSE 79 05/03/2020    CALCIUM 9.1 05/03/2020        IMPRESSION:    64 y.o. female with multiple medical problems. She is s/p attempted cholecystectomy on 3/2 that was unable to be completed due to severe cirrhosis and drain was left in or adjacent to the gallbladder.       Patient Active Problem List:     Calculus of ureter     Asthma     Obstructive sleep apnea     Breast mass, left     Rotator cuff arthropathy     Malignant neoplasm of left female breast (HCC)     History of breast lump/mass excision     S/P lymph node biopsy     Cirrhosis of liver without ascites (HCC)     Pain of left breast     S/P radiation therapy 4-12 wks ago     Diabetes mellitus with skin ulcer (HCC)     WD-Ulcer of abdomen wall with fat layer exposed ([address]     Mild persistent asthma without complication     WD-Unspecified open wound of abdominal wall, right lower quadrant without penetration into peritoneal cavity, initial encounter     Retroperitoneal lymphadenopathy     WD-Local infection of skin and subcutaneous tissue     Liver cirrhosis secondary to GALLEGOS (nonalcoholic steatohepatitis) (HCC)     CHF (congestive heart failure), NYHA class I, acute on chronic, combined (HCC)     Low hemoglobin     Right-sided chest wall pain     Shortness of breath     Abnormal transaminases     Hepatocellular carcinoma (HCC)     Hypoglycemia associated with diabetes (HCC)     Unresponsive episode     E coli bacteremia     Sepsis (HCC)     Cellulitis of buttock     Iron deficiency anemia secondary to blood loss (chronic)     Nonalcoholic steatohepatitis     Secondary thrombocytopenia     Sepsis, unspecified organism ([address]     HCAP (healthcare-associated pneumonia)     Acute exacerbation of CHF (congestive heart failure) ([address]     Acute pulmonary edema (HCC)     Acute kidney injury ([address]     Electrolyte disturbance     Acute on chronic respiratory failure with hypoxia ([address]     Suspected COVID-19 virus infection        PLAN:  - recommend DEMETRIO to bulb drainage, accurate I/Os of drain output  - consider GI evaluation given her cirrhosis and elevated bilirubin  - will check direct/indirect bilirubin to delineate obstructive vs intrinsic nature.   Bump may be due to sepsis  - RUQ us to start with, she may need further imaging of her liver/biliary system  - will continue to follow        Electronically signed by Mara Lomax MD on 5/3/2020 at 9:24 AM

## 2020-05-03 NOTE — CONSULTS
and  biochemical.  Imaging study mainly chest x-ray showed patchy pulmonary  opacity, could have been atelectasis or pneumonitis and cardiomegaly. Biochemical testing showed high creatinine of 2.8, low sodium of 134,  bicarb of 20, BUN of 101, and lactate level of 4.7, significant  leukocytosis and anemia with hemoglobin of 7.8, but platelet count of  04.0. She was given IV fluids. Rocephin was reinitiated and  subsequently admitted for further evaluation. This morning, when I saw her she was not in any distress. She wants to  get a Angeles catheter, as she has stage II decubitus ulcer. She is  hemodynamically otherwise stable. I am of course familiar with her. I have been seeing her since 04/2020. This is her third admission here in the hospital.  Both of the time  previously she was transferred to [facility] only to come back. Basically  speaking, her creatinine is 0.7. She has had several renal insults  lately and currently is 2.8. PAST MEDICAL HISTORY:  1. Recurrent acute kidney injury by creatinine criteria, currently is  2.8.  2.  Diabetes mellitus, 20 plus years history, type 2. She is on insulin  therapy. She sees Dr. Amy Montes De Oca. No known retinopathy. 3.  Thyroid cancer five years ago, underwent thyroidectomy. She also  has left breast cancer and underwent mastectomy and needed some  radiation therapy. She is on hormonal therapy now orally. 4.  Recently, she was diagnosed with hepatocellular carcinoma after  having cirrhosis, thought to be induced by nonalcoholic fatty liver  disease. She underwent TACE, which is a transcatheter arterial  chemoembolization in [facility] and also had a DEMETRIO drain and then  cholecystostomy with drain and a tube. Her DEMETRIO drain was removed during  previous hospitalization at [facility], but she still has the cholecystostomy  tube and the plan was to remove as an outpatient and let the gallbladder  heal.  5.  Obstructive sleep apnea. She was on CPAP machine at home.

## 2020-05-03 NOTE — ED PROVIDER NOTES
12 lead EKG per my interpretation:  Normal Sinus Rhythm at 73 with PACs  Axis is   Normal  QTc is  within an acceptable range  There is no specific T wave changes appreciated. There is no specific ST wave changes appreciated. No STEMI    Prior EKG to compare with was available and no clinically significant change in overall morphology.     Ashley Ansari MD  05/03/20 6785

## 2020-05-04 NOTE — PROGRESS NOTES
Physical Therapy  PT attempt for eval. Nursing report p's BP is low and is not appropriate for therapy. Will re-attempt when medically stable.   Electronically signed by Miah Ames PT on 5/4/2020 at 10:48 AM

## 2020-05-04 NOTE — CONSULTS
neurologic, psych, hem/lymphatic, musculoskeletal and endocrine were negative other than what is mentioned above.      Patient Active Problem List    Diagnosis Date Noted    Low hemoglobin      Priority: High    Sepsis, unspecified organism ([address] 04/23/2020    HCAP (healthcare-associated pneumonia) 04/23/2020    Acute exacerbation of CHF (congestive heart failure) ([address] 04/23/2020    Acute pulmonary edema ([address] 04/23/2020    Acute kidney injury ([address] 04/23/2020    Electrolyte disturbance 04/23/2020    Acute on chronic respiratory failure with hypoxia ([address] 04/23/2020    Suspected COVID-19 virus infection 04/23/2020    Iron deficiency anemia secondary to blood loss (chronic) 83/98/4877    Nonalcoholic steatohepatitis 04/29/0606    Secondary thrombocytopenia 04/14/2020    Cellulitis of buttock     Sepsis ([address] 04/02/2020    Unresponsive episode     E coli bacteremia     Hypoglycemia associated with diabetes ([address] 03/15/2020    Abnormal transaminases 02/29/2020    Hepatocellular carcinoma ([address] 02/29/2020    Shortness of breath 10/28/2019    Right-sided chest wall pain 10/26/2019    CHF (congestive heart failure), NYHA class I, acute on chronic, combined ([address] 09/07/2018    Liver cirrhosis secondary to GALLEGOS (nonalcoholic steatohepatitis) ([address] 06/19/2018    WD-Local infection of skin and subcutaneous tissue 12/08/2017    Retroperitoneal lymphadenopathy 09/10/2017    WD-Unspecified open wound of abdominal wall, right lower quadrant without penetration into peritoneal cavity, initial encounter     Mild persistent asthma without complication 08/75/2033    Diabetes mellitus with skin ulcer ([address]     WD-Ulcer of abdomen wall with fat layer exposed ([address]     Pain of left breast 07/30/2016    S/P radiation therapy 4-12 wks ago 07/30/2016    Cirrhosis of liver without ascites ([address]     S/P lymph node biopsy 12/06/2015    Malignant neoplasm of left female breast ([address] 11/12/2015    History of Ardella Dandy, MD at [address] [facility] [address]  07/10/2017    LIVER SURGERY      TACE procedure in Aug 2019   [facility] [address] [address]    \"took most of the thyroid out\"    UPPER GASTROINTESTINAL ENDOSCOPY        Family History   Problem Relation Age of Onset   [de-identified] Cancer Mother         breast    Diabetes Mother     Heart Disease Father         MI    Diabetes Father     Other Sister         cirhosis    Heart Disease Sister     Other Brother         cirhosis    Kidney Disease Brother     Asthma Sister         COPD    Cancer Brother         liver cancer      Infectious disease related family history - not contibutory. SOCIAL HISTORY  Social History     Tobacco Use    Smoking status: Former Smoker     Packs/day: 1.50     Years: 20.00     Pack years: 30.00     Last attempt to quit: 3/23/2005     Years since quitting: 15.1    Smokeless tobacco: Never Used   Substance Use Topics    Alcohol use: No     Alcohol/week: 0.0 standard drinks       Born:  [facility] Occupation:   No recent travel of significance.  No recent unusual exposures.  NO pets    ? ALLERGIES  Allergies   Allergen Reactions    Latex     Lasix [Furosemide] Hives    Sulfa Antibiotics Rash    Nystatin Other (See Comments)     Pt reports \"burning\" sensation to skin    Tape [Adhesive Tape]       MEDICATIONS  Reviewed and are per the chart/EMR. ? Antibiotics:   Unasyn  ?  -------------------------------------------------------------------------------------------------------------------    Vital Signs:  Vitals:    05/04/20 1232   BP: (!) 96/37   Pulse: 70   Resp: 28   Temp: 95 °F (35 °C)   SpO2:          Exam:    VS: noted; wt   Gen: alert and oriented X3, no distress  Skin: no stigmata of endocarditis  Wounds: C/D/I  HEMT: AT/NC Oropharynx pink, moist, and without lesions or exudates; dentition in poor state of repair  Eyes: PERRLA, EOMI, conjunctiva pink, sclera anicteric. Neck: Supple. Trachea midline. No LAD. Chest: no distress and CTA.

## 2020-05-04 NOTE — PROGRESS NOTES
with e.coli with abx   4. underlyimng active HCC and multiple other malitganacy  5. Underlying DM / NAFLD and poor povelal fucntional staus    6. Multifactorial anemia     Recommendation/Plan  :     1. Stop IVF  2. Po food/; fluid   3. Little bit didcciult situation for hewr - I am little reluctant to give emp steroid with DM/actibvcve cancer and infection - also she is not a good [id number] for renal bx- so I wpoudl like to wait and see few  Days  how things pan out as she can have ATI   4. Watch  UOP  5.  avoid any non essential meds  6. Labs in am   7.  Daily wt       Isis Haywood MD

## 2020-05-04 NOTE — PROGRESS NOTES
Patient is still hypotensive. Perfect serve message sent to Dr. Barron Hinders to see if he would like to start pt on a pressor.

## 2020-05-04 NOTE — CONSULTS
evaluated for liver  transplantation on 03/05/2020. The patient was discharged from [facility], but was readmitted again on 03/16/2020 with  unresponsiveness secondary to hypoglycemia. The patient was treated for  her symptoms and then discharged, but readmitted again on 04/27/2020  with sepsis, which was attributed to E. coli and the source being the  cholecystostomy tube/gallbladder fossa. The patient was again  transferred to [facility] on 04/28/2020, but last night  signed out AMA because, according to the patient, in fact \"nothing was  being done there. \"  After arriving home, the patient started complaining  of increased weakness and called the squad and presented back to the  emergency room here at [facility]. The patient complains of mild upper abdominal discomfort and complains  of occasional nausea and vomiting, but no hematemesis, melena, or  hematochezia. The patient is hemodynamically stable and is on IV  antibiotics. The blood workup done today upon arrival showed a BUN of  101, creatinine 2.8 and the patient's LFTs show total bilirubin of 5.5  with direct of 2.2, indirect 2.4, and AST 36, ALT 12, and alkaline  phosphatase 117. The patient's WBC count is elevated at 26.4,  hemoglobin 7.8, and platelet count of 58,144. The patient seems to have  multiple organ dysfunction syndrome at present and has been seen by the  surgical consultant, ID, and nephrology consultant as well. There is no  evidence of gross GI bleeding at present. The patient is on IV  antibiotics and is also on Eliquis as well. The patient has had an extensive GI workup done in the past and  initially the patient was being seen by Dr. Chino Ruelas from GI who did  an EGD on 05/05/2016 and the patient was noted to have antral gastritis,  biopsies for H. pylori were negative.   Colonoscopy was attempted on  12/01/2016 and the prep was poor and the procedure was canceled

## 2020-05-04 NOTE — PROGRESS NOTES
infection/inflammation Reason for Exam: resp failire, assess lung nodules noted on ct abd/pelvis that are concerning for infection/inflammation Acuity: Acute Type of Exam: Initial Additional signs and symptoms: unable to follow commands Relevant Medical/Surgical History: none FINDINGS: Mediastinum: No thoracic aortic aneurysm. Coronary artery calcifications. Very small pericardial effusion. No adenopathy. Lungs/pleura: No pneumothorax. Patchy consolidation posteriorly within the lower lobes as well as less prominent patchy consolidation and ground-glass opacities peripherally in the upper lobes and lower lobes. Upper Abdomen: Prominent soft tissue edema along the right lateral upper abdominal wall. Portion of a drain noted in the right upper quadrant. Soft Tissues/Bones: No acute bony abnormality. Patchy consolidation posteriorly within the lower lobes as well as less prominent patchy consolidation and ground-glass opacities peripherally in the upper lobes and lower lobes. Findings are nonspecific but can be seen in pneumonia including viral pneumonia. Xr Chest Portable    Result Date: 5/3/2020  EXAMINATION: ONE XRAY VIEW OF THE CHEST 5/3/2020 2:25 am COMPARISON: April 27, 2020. HISTORY: ORDERING SYSTEM PROVIDED HISTORY: weakness TECHNOLOGIST PROVIDED HISTORY: Reason for exam:->weakness Reason for Exam: weakness Acuity: Unknown Type of Exam: Initial FINDINGS: Cardiac and mediastinal contours are enlarged but unchanged. Patchy pulmonary opacity within left lower lung. Right lung remains clear. Mild pulmonary venous congestion. No significant pleural effusion. No evidence of pneumothorax. No evidence of acute osseous abnormalities. Curvilinear radiopaque object projects over the left lower neck. 1. Patchy pulmonary opacity within left lower lung may represent atelectasis, and/or pneumonia. 2. Enlarged cardiomediastinal silhouette with a mild pulmonary venous congestion.   No significant pleural effusion. 3. Curvilinear radiopaque object projects over the left lower neck. This is presumably extrinsic to the patient, however, clinical correlation is recommended. RECOMMENDATION: Follow-up PA and lateral chest radiograph in 6 weeks. Xr Chest Portable    Result Date: 4/27/2020  EXAMINATION: ONE XRAY VIEW OF THE CHEST 4/27/2020 11:59 am COMPARISON: CT abdomen/pelvis 04/24/2020. Chest radiograph 04/24/2020. HISTORY: ORDERING SYSTEM PROVIDED HISTORY: SOB TECHNOLOGIST PROVIDED HISTORY: Reason for exam:->SOB Reason for Exam: SOB Acuity: Acute Type of Exam: Initial FINDINGS: Single view provided. Moderate rotation. Left-sided CVC with tip in the proximal superior vena cava. Stable enlarged cardiac silhouette. Stable hypoaeration with mild patchy mid to lung base pulmonary opacities. No progressive consolidation. No effusion or pneumothorax. No free subdiaphragmatic air. 1. Stable cardiomegaly. 2. Stable mild patchy mid to lung base pulmonary opacities. No effusion. 3. Left CVC with tip in the region of the superior vena cava. Us Abdomen Limited Specify Organ? Gallbladder    Result Date: 5/3/2020  EXAMINATION: RIGHT UPPER QUADRANT ULTRASOUND 5/3/2020 10:42 am COMPARISON: Noncontrast CT abdomen and pelvis 4/24/2020. Contrast-enhanced CT abdomen and pelvis 4/2/2020. HISTORY: ORDERING SYSTEM PROVIDED HISTORY: please evaluate gallbladder and biliary system as able TECHNOLOGIST PROVIDED HISTORY: Reason for exam:->please evaluate gallbladder and biliary system as able Specify organ?->GALLBLADDER Reason for Exam: ruq pain, cirrhosis, HCC, sepsis Acuity: Unknown Type of Exam: Initial FINDINGS: Somewhat limited and technically difficult exam secondary to patient body habitus. Within this limitation: LIVER: Cirrhotic liver morphology. A known lesion to the right lobe of the liver is not as well demonstrated on these images as prior contrast enhanced CT exam 4/2/2020.   Reference to that study can be made 4\" (1.626 m)   Wt 286 lb 1.6 oz (129.8 kg)   LMP 01/03/2012   SpO2 92%   BMI 49.11 kg/m²   General appearance: alert and cooperative with exam  Neck: no JVD or bruit  Thyroid : Normal lobes   Lungs: Has Vesicular Breath sounds   Heart:  regular rate and rhythm  Abdomen: soft, non-tender; bowel sounds normal; no masses,  no organomegaly  Musculoskeletal: Normal  Extremities: extremities normal, , no edema  Neurologic:  Awake, alert, oriented to name, place and time. Cranial nerves II-XII are grossly intact. Motor is  intact. Sensory is intact. ,  and gait is normal.    Assessment:     Patient Active Problem List:     Calculus of ureter     Asthma     Obstructive sleep apnea     Breast mass, left     Rotator cuff arthropathy     Malignant neoplasm of left female breast (HCC)     History of breast lump/mass excision     S/P lymph node biopsy     Cirrhosis of liver without ascites (HCC)     Pain of left breast     S/P radiation therapy 4-12 wks ago     Diabetes mellitus with skin ulcer (HCC)     WD-Ulcer of abdomen wall with fat layer exposed ([address]     Mild persistent asthma without complication     WD-Unspecified open wound of abdominal wall, right lower quadrant without penetration into peritoneal cavity, initial encounter     Retroperitoneal lymphadenopathy     WD-Local infection of skin and subcutaneous tissue     Liver cirrhosis secondary to GALLEGOS (nonalcoholic steatohepatitis) (HCC)     CHF (congestive heart failure), NYHA class I, acute on chronic, combined (HCC)     Low hemoglobin     Right-sided chest wall pain     Shortness of breath     Abnormal transaminases     Hepatocellular carcinoma (HCC)     Hypoglycemia associated with diabetes (HCC)     Unresponsive episode     E coli bacteremia     Sepsis (HCC)     Cellulitis of buttock     Iron deficiency anemia secondary to blood loss (chronic)     Nonalcoholic steatohepatitis     Secondary thrombocytopenia     Sepsis, unspecified organism ([address]     HCAP

## 2020-05-04 NOTE — PROGRESS NOTES
82*      BMP   Recent Labs     05/03/20  0306 05/04/20  0712   * 131*   K 4.3 4.5   CL 95* 96*   CO2 20* 16*   * 106*   CREATININE 2.8* 3.6*         Electronically signed by Tanna Garcia MD on 5/4/2020 at 9:18 AM

## 2020-05-04 NOTE — PROGRESS NOTES
GENERAL SURGERY PROGRESS NOTE    CC/HPI:           Patient feels ok, drain draining bile. Vitals:    05/03/20 1315 05/03/20 1522 05/03/20 1945 05/04/20 0746   BP: (!) 92/44 (!) 94/40 (!) 95/40    Pulse: 78 78     Resp: 19 18     Temp: 97.9 °F (36.6 °C) 97.7 °F (36.5 °C) 97.5 °F (36.4 °C)    TempSrc: Oral Oral Oral    SpO2: 95% 96%  92%   Weight:       Height:         I/O last 3 completed shifts: In: 300 [P.O.:200; IV Piggyback:100]  Out: 150 [Urine:100; Drains:50]  No intake/output data recorded.     DIET RENAL;    Recent Results (from the past 48 hour(s))   EKG 12 Lead    Collection Time: 05/03/20  2:52 AM   Result Value Ref Range    Ventricular Rate 73 BPM    Atrial Rate 73 BPM    P-R Interval 158 ms    QRS Duration 96 ms    Q-T Interval 430 ms    QTc Calculation (Bazett) 473 ms    P Axis 53 degrees    R Axis 2 degrees    T Axis 43 degrees    Diagnosis       Sinus rhythm with premature atrial complexes  Otherwise normal ECG  When compared with ECG of 23-APR-2020 12:15,  premature atrial complexes are now present  Confirmed by Herbie Elliott MD, Yvette Brown ([id number]) on 5/3/2020 4:52:52 PM     CBC Auto Differential    Collection Time: 05/03/20  3:06 AM   Result Value Ref Range    WBC 26.4 (H) 4.0 - 10.5 K/CU MM    RBC 2.83 (L) 4.2 - 5.4 M/CU MM    Hemoglobin 7.8 (L) 12.5 - 16.0 GM/DL    Hematocrit 25.1 (L) 37 - 47 %    MCV 88.7 78 - 100 FL    MCH 27.6 27 - 31 PG    MCHC 31.1 (L) 32.0 - 36.0 %    RDW 21.4 (H) 11.7 - 14.9 %    Platelets 76 (L) 998 - 440 K/CU MM    Differential Type AUTOMATED DIFFERENTIAL     Segs Relative 86.8 (H) 36 - 66 %    Lymphocytes % 5.1 (L) 24 - 44 %    Monocytes % 4.5 (H) 0 - 4 %    Eosinophils % 0.2 0 - 3 %    Basophils % 0.2 0 - 1 %    Segs Absolute 22.9 K/CU MM    Lymphocytes Absolute 1.4 K/CU MM    Monocytes Absolute 1.2 K/CU MM    Eosinophils Absolute 0.1 K/CU MM    Basophils Absolute 0.1 K/CU MM    Nucleated RBC % 0.4 %    Total Nucleated RBC 0.1 K/CU MM    Total Immature NEGATIVE    Leukocyte Esterase, Urine MODERATE (A) NEGATIVE    RBC,  (H) 0 - 6 /HPF    WBC, UA 24 (H) 0 - 5 /HPF    Bacteria, UA FEW (A) NEGATIVE /HPF    Squam Epithel, UA 2 /HPF    Trans Epithel, UA <1 /HPF    Mucus, UA RARE (A) NEGATIVE HPF    Trichomonas, UA NONE SEEN NONE SEEN /HPF   Sodium, urine, random    Collection Time: 05/03/20  9:35 AM   Result Value Ref Range    Sodium, Ur 24 (L) 35 - 167 MMOL/L   Protein / creatinine ratio, urine    Collection Time: 05/03/20  9:35 AM   Result Value Ref Range    Urine Total Protein 181.7 (H) <12 MG/DL    Creatinine, Ur 93.7 28 - 217 MG/DL    Prot/Creat Ratio, Ur 1.9 (H) <0.2   Culture, Urine    Collection Time: 05/03/20  9:35 AM   Result Value Ref Range    Specimen URINE     Special Requests NONE     Culture NO AEROBIC GROWTH AT 24 HOURS    POCT Glucose    Collection Time: 05/03/20  1:41 PM   Result Value Ref Range    POC Glucose 76 70 - 99 MG/DL   Lactate, Sepsis    Collection Time: 05/03/20  2:30 PM   Result Value Ref Range    Lactic Acid, Sepsis 4.2 (HH) 0.5 - 1.9 mMOL/L   Troponin    Collection Time: 05/03/20  2:30 PM   Result Value Ref Range    Troponin T 0.024 (H) <0.01 NG/ML   Bilirubin total direct & indirect    Collection Time: 05/03/20  2:30 PM   Result Value Ref Range    Total Bilirubin 4.6 (H) 0.0 - 1.0 MG/DL    Bilirubin, Direct 2.2 (H) 0.0 - 0.3 MG/DL    Bilirubin, Indirect 2.4 (H) 0 - 0.7 MG/DL   POCT Glucose    Collection Time: 05/03/20  4:54 PM   Result Value Ref Range    POC Glucose 90 70 - 99 MG/DL   Lactate, Sepsis    Collection Time: 05/03/20  4:55 PM   Result Value Ref Range    Lactic Acid, Sepsis 4.0 (HH) 0.5 - 1.9 mMOL/L   Troponin    Collection Time: 05/03/20  6:50 PM   Result Value Ref Range    Troponin T 0.022 (H) <0.01 NG/ML   POCT Glucose    Collection Time: 05/03/20 11:39 PM   Result Value Ref Range    POC Glucose 102 (H) 70 - 99 MG/DL   APTT    Collection Time: 05/04/20  7:12 AM   Result Value Ref Range    aPTT 76.3 (H) 25.1 - 37.1 intact. Heart:  Normal S1 and S2, RRR  Lungs: Clear to auscultation bilaterally, No audible Wheezes or Rales. Extremities: No edema. Neuro: Alert and Oriented x 3, Non focal.  Abdomen: Soft, Benign, Non tender, Non distended, Positive bowel sounds. Cholecystostomy tube draining bile. Incisions: Nicely healing: No erythema, No discharge. Active Problems:    Sepsis ([address]  Resolved Problems:    * No resolved hospital problems. *      Assessment and Plan:  Karmen Lewis is a 64 y.o. female with cirrhosis of the liver, and liver ca, and a cholecystostomy tube draining BILE, and needs to stay in draining till her liver transplant surgery, she will get peritonitis if it is clamped or removed sooner. .    Will follow. ___________________________________________    Shaq Murphy MD, FACS, FICS  5/4/2020  8:42 AM    Patient was seen with total face to face time of 25 minutes. More than 50% of this visit was counseling and education as above in my assessment and plan section of my note.

## 2020-05-04 NOTE — DISCHARGE SUMMARY
disease    Time of death: 17:19    Hospital course: Cresencio Spatz is a 64 y.o.  female  who presented with     Recent Sepsis with E. Coli bacteremia  - pt was transferred to [facility] on 4/28, she left AMA and she adamantly refuses to be transferred back there, wants her care at [facility].  - 3/2/20: Laparoscopic exploration, attempted cholecystectomy - aborted due to the severe liver cirrhosis and portal hypertension, + laparoscopic cholecystostomy tube placement   - 04/23 BC 2/2 E coli pan-sensitive. Repeat BC 04/24: NGTD  - check blood cx again  - check urine cx NTD  - she was on IV Unasyn prior to us transferring her to [facility] so will start that now. - check RUQ US: cirrhosis, known right lobe of liver lesion. Known complex gas-containing collection in the gallbladder fossa  - ID consulted  - Surgery consulted, cholecystostomy tube needs to remain in place  - consult GI     Elevated INR  - will give 1 unit FFP    - monitor     POLLO on CKD  - worsening  - IVF   - monitor labs  - Nephro consulted     Anemia of Chronic Disease  - no signs of gross bleeding  - transfuse if <7.0  - check iron panel, ferritin, b12 and folate     Lactic Acidosis  - IVF  - monior     tx to ICU     Will consult palliative care, pt has been taken off transplant list, she has left AMA from [facility] when we and patient herself agreed to go there for further care and treatment. Per Code Team 5/4/20 evening:  \"CPR done about 35 minutes for PEA and asystole. Multiple rounds of CPR and multiple doses of epi given. Called  Alvarado Mccabe on phone. Agreed to stop resuscitative efforts. Declared death at 17:19Hrs\"           Chronic diastolic CHF  Chronic resp failure  PAF - was on full dose lovenox prior to [facility] tx, so will start low dose eliquis now   S/P Placement of cholecystostomy tube placement  DM Type 2  Liver cancer, pt states she has been taking off the transplant list due to her current medical state.   Cirrhosis  JARAD and acute on chorinc

## 2020-05-04 NOTE — PROGRESS NOTES
PTS GLOBAL CONDITION DETERIORATING AND TRANSFERRED TO ICU  PT SOB NO ABD PAIN OR GROSS GIT BLEEDING WORSENING RENAL FUNCTIONS  VITALS NOTED LABS WBC INCREASED TO 30.6 INR 5,39 LFTS NOTED AMMONIA 34  WILL CPM WITH PALLIATIVE CARE CONSULT REQUESTED  OVERALL PROGNOSIS GUARDED SINCE PT IN MODS

## 2020-05-04 NOTE — CONSULTS
layer exposed ([facility]     COPD (chronic obstructive pulmonary disease) ([facility]     follow with Dr Alla Ramos Diabetes mellitus ([facility]     dx10+ yrs ago- follows with Dr Raul May Diabetes mellitus with skin ulcer ([facility]     History of kidney stones     \"last one 3/2016- passed it- have had stones off and on for past 5 yrs follow with Dr Bruno Skinner Hypertension     Liver cirrhosis ([facility]     for liver bx 7/10/2017    Morbid obesity ([facility] 10/20/2015    PT TOO LARGE FOR MRI LEFT SHOULDER    On home oxygen therapy     oxygen at 4l/nc at hs    PONV (postoperative nausea and vomiting)     \"did get sick with the breast surgery\"    Sleep apnea     \"had sleep study several yrs ago- c-pap  does use it\"    Thyroid disease     WD-Local infection of skin and subcutaneous tissue 12/8/2017    WD-Ulcer of abdomen wall, limited to breakdown of skin ([facility]     WD-Unspecified open wound of abdominal wall, right lower quadrant without penetration into peritoneal cavity, initial encounter        Past Surgical History:        Procedure Laterality Date    ABDOMEN SURGERY N/A     BREAST SURGERY Left 2015    EXCISION MASS\"lumpectomy- took out 3 lymph nodes all ok\"    CARDIAC CATHETERIZATION  01/21/2020    no intervention    CYSTOSCOPY Left 12/23/13    stent placement    LAPAROSCOPY N/A 3/2/2020    ATTEMPTED CHOLECYSTECTOMY LAPAROSCOPIC WITH IOC ABORTED BECAUSE OF LIVER CIRRHOSIS, CHOLECYSTOSTOMY TUBE PLACED, DRAIN PLACED performed by Ruel Lainez MD at [address] [place] [address]  07/10/2017    LIVER SURGERY      TACE procedure in Aug 2019   Doctor Hermin[address]    \"took most of the thyroid out\"    UPPER GASTROINTESTINAL ENDOSCOPY         Current Medications:    Current Facility-Administered Medications: budesonide-formoterol (SYMBICORT) 160-4.5 MCG/ACT inhaler 2 puff, 2 puff, Inhalation, BID PRN  0.9 % sodium chloride bolus, 20 mL, Intravenous, Once  exemestane (AROMASIN) tablet 25 mg, 25 mg, Oral, Daily  isosorbide mononitrate (IMDUR) extended release tablet 30 mg, 30 mg, Oral, Nightly  levothyroxine (SYNTHROID) tablet 200 mcg, 200 mcg, Oral, Daily  zolpidem (AMBIEN) tablet 5 mg, 5 mg, Oral, Nightly  sodium chloride flush 0.9 % injection 10 mL, 10 mL, Intravenous, 2 times per day  sodium chloride flush 0.9 % injection 10 mL, 10 mL, Intravenous, PRN  acetaminophen (TYLENOL) tablet 650 mg, 650 mg, Oral, Q6H PRN **OR** acetaminophen (TYLENOL) suppository 650 mg, 650 mg, Rectal, Q6H PRN  polyethylene glycol (GLYCOLAX) packet 17 g, 17 g, Oral, Daily PRN  promethazine (PHENERGAN) tablet 12.5 mg, 12.5 mg, Oral, Q6H PRN **OR** ondansetron (ZOFRAN) injection 4 mg, 4 mg, Intravenous, Q6H PRN  glucose (GLUTOSE) 40 % oral gel 15 g, 15 g, Oral, PRN  dextrose 50 % IV solution, 12.5 g, Intravenous, PRN  glucagon (rDNA) injection 1 mg, 1 mg, Intramuscular, PRN  dextrose 5 % solution, 100 mL/hr, Intravenous, PRN  atorvastatin (LIPITOR) tablet 10 mg, 10 mg, Oral, Nightly  insulin lispro (HUMALOG) injection vial 0-6 Units, 0-6 Units, Subcutaneous, TID WC  insulin lispro (HUMALOG) injection vial 0-3 Units, 0-3 Units, Subcutaneous, 2 times per day  apixaban (ELIQUIS) tablet 2.5 mg, 2.5 mg, Oral, BID  ampicillin-sulbactam (UNASYN) 3 g ivpb minibag, 3 g, Intravenous, Q12H  ipratropium-albuterol (DUONEB) nebulizer solution 3 mL, 1 vial, Inhalation, Q6H PRN  pantoprazole (PROTONIX) injection 40 mg, 40 mg, Intravenous, Daily    Allergies:  Latex; Lasix [furosemide]; Sulfa antibiotics; Nystatin; and Tape [adhesive tape]    Social History:    Has been residing with her  of 40 years . She has two sons.     Family History:       Problem Relation Age of Onset   Milinda Broom Cancer Mother         breast    Diabetes Mother     Heart Disease Father         MI    Diabetes Father     Other Sister         cirhosis    Heart Disease Sister     Other Brother         cirhosis    Kidney Disease Brother     Asthma Sister         COPD    Cancer Brother the inferior right hepatic   lobe which is not well delineated on this noncontrast exam.  Enlargement of   the mass cannot be excluded.  Patient with medical history of liver/breast   cancer. 2. Similar appearance of heterogeneous collection containing air at the level   of the gallbladder fossa with adjacent percutaneous drain as on previous exam   from April 2, 2019.  No significant interval change. 3. No new fluid collections are identified. 4. Interval development of bilateral nodular densities along the anterior   aspect at the imaged lung bases measuring up to 1 cm at the lingula.  Given   interval development from CT dated April 2, 2020, findings favored to be   infectious/inflammatory. ECHO: 04/04/2020  Summary   Technically difficult examination due to body habitus. Left ventricular systolic function is normal.   Ejection fraction is visually estimated at 50-55%. Mild left ventricular hypertrophy. Mild mitral annular calcification is present. No evidence of any pericardial effusion. IMPRESSION:    64year old female with GALLEGOS and [address] along with multiple hospital admissions for Palliative Care encounter. I spoke at length with the patient regarding her goals. She would like to feel well and go home. She is sad that she is no longer on the transplant list.  The patient does have advance directives and her  is the  HCPOA. We discussed resuscitation and the patient does not want to be intubated but states that she would want CPR and defibrillation \"once\"  I asked why \"once\" and she stated that her  wanted her to have it one time. I let her know that this is her choice and her body and she can make the decision that she is most comfortable with. I did bring up the concept of hospice as an option for the patient to consider if she did not want any further aggressive therapies and wanted comfort and quality of life. She was going to think about this.     The patient and/or

## 2020-05-04 NOTE — CARE COORDINATION
F/u with pt for SNF choice. Pt's first choice is [facility] and second choice is [facility]. Referral made to [facility]. She will review info and verify insurance. She will notify CM of their decision. Requested PT/OT to see pt asap via WB.   TE

## 2020-05-04 NOTE — CONSULTS
Nutrition Assessment    Type and Reason for Visit: Initial, Positive Nutrition Screen, Consult    Nutrition Recommendations:   · Continue current diet  · Start wound healing supplements    Nutrition Assessment: Pt fell out due to a positive screen for wounds. Pt has multiple pressure injuries that are chronic due to lifestyle. Pt does not report poor intake or weight loss. Her weight appears stable with a few fluid fluctuations. Will order supplements and continue to follow. Malnutrition Assessment:  · Malnutrition Status:  At risk for malnutrition  · Context: Social or environmental circumstances    Nutrition Risk Level: High    Nutrient Needs:  · Estimated Daily Total Kcal: 2528-2459 based on MSJ  · Estimated Daily Protein (g): 76-86 based on 1.4-1.6 g/kg/IBW  · Estimated Daily Total Fluid (ml/day): 0364-4247 based on 1 mL/kcal    Nutrition Diagnosis:   · Problem: Increased nutrient needs  · Etiology: related to Endocrine dysfunction, Renal dysfunction     Signs and symptoms:  as evidenced by Presence of wounds    Objective Information:  · Wound Type: Multiple, Pressure Ulcer  · Current Nutrition Therapies:  · Oral Diet Orders: Renal   · Oral Diet intake: Unable to assess  · Oral Nutrition Supplement (ONS) Orders: None  · Anthropometric Measures:  · Ht: 5' 4\" (162.6 cm)   · Current Body Wt: 295 lb (133.8 kg)  · Admission Body Wt: 295 lb (133.8 kg)  · Usual Body Wt: 300 lb (136.1 kg)  · % Weight Change: none noted  · Ideal Body Wt: 120 lb (54.4 kg), % Ideal Body 246%  · BMI Classification: BMI > or equal to 40.0 Obese Class III    Nutrition Interventions:   Continue current diet, Start ONS  Continued Inpatient Monitoring, Education Not Indicated, Coordination of Care    Nutrition Evaluation:   · Evaluation: Goals set   · Goals: pt will consume greater than 75% of her meals and supplements    · Monitoring: Meal Intake, Supplement Intake, Pertinent Labs, Weight, Wound Healing      Electronically signed by

## 2020-05-04 NOTE — CARE COORDINATION
[facility] notified this CM that they will not be able to accept pt d/t financial reasons. Pt transferred to ICU this am. Notified pt's STEVE/Robina of denial and of pt's second SNF choice.   TE

## 2020-05-05 LAB
COMPONENT: NORMAL
EKG ATRIAL RATE: 73 BPM
EKG DIAGNOSIS: NORMAL
EKG P AXIS: 53 DEGREES
EKG P-R INTERVAL: 158 MS
EKG Q-T INTERVAL: 430 MS
EKG QRS DURATION: 96 MS
EKG QTC CALCULATION (BAZETT): 473 MS
EKG R AXIS: 2 DEGREES
EKG T AXIS: 43 DEGREES
EKG VENTRICULAR RATE: 73 BPM
STATUS: NORMAL
TRANSFUSION STATUS: NORMAL
UNIT DIVISION: 0
UNIT NUMBER: NORMAL

## 2020-05-06 LAB
1,3 BETA-D-GLUCAN INTERP: POSITIVE
1,3 BETA-D-GLUCAN: 219 PG/ML

## 2020-05-08 LAB
CULTURE: NORMAL
CULTURE: NORMAL
Lab: NORMAL
Lab: NORMAL
SPECIMEN: NORMAL
SPECIMEN: NORMAL

## 2020-06-25 NOTE — H&P
with radiation- following with Dr Gladys Huang CHF (congestive heart failure) ([facility]     Chronic ulcer of right thigh with fat layer exposed ([address]     COPD (chronic obstructive pulmonary disease) ([address]     follow with Dr Yoly Francois Diabetes mellitus ([facility]     dx10+ yrs ago- follows with Dr Javan Beth    Diabetes mellitus with skin ulcer ([facility]     History of kidney stones     \"last one 3/2016- passed it- have had stones off and on for past 5 yrs follow with Dr Sam Miles Hypertension     Liver cirrhosis ([facility]     for liver bx 7/10/2017    Morbid obesity ([address] 10/20/2015    PT TOO LARGE FOR MRI LEFT SHOULDER    On home oxygen therapy     oxygen at 4l/nc at hs    PONV (postoperative nausea and vomiting)     \"did get sick with the breast surgery\"    Sleep apnea     \"had sleep study several yrs ago- c-pap  does use it\"    Thyroid disease     WD-Local infection of skin and subcutaneous tissue 12/8/2017    WD-Ulcer of abdomen wall, limited to breakdown of skin ([facility]     WD-Unspecified open wound of abdominal wall, right lower quadrant without penetration into peritoneal cavity, initial encounter        PAST SURGICAL HISTORY    Past Surgical History:   Procedure Laterality Date    BREAST SURGERY Left 2015    EXCISION MASS\"lumpectomy- took out 3 lymph nodes all ok\"    CYSTOSCOPY Left 12/23/13    stent placement    LIVER BIOPSY  07/10/2017    THYROIDECTOMY  1990    \"took most of the thyroid out\"    UPPER GASTROINTESTINAL ENDOSCOPY         FAMILY HISTORY    Family History   Problem Relation Age of Onset    Cancer Mother      breast    Diabetes Mother     Heart Disease Father      MI    Diabetes Father     Other Sister      cirhosis    Heart Disease Sister     Other Brother      cirhosis    Kidney Disease Brother     Asthma Sister      COPD    Cancer Brother      liver cancer       SOCIAL HISTORY    Social History   Substance Use Topics    Smoking status: Former Smoker     Packs/day: 1.50     Years: Medications on File Prior to Encounter   Medication Dose Route Frequency Provider Last Rate Last Dose    butamben-tetracaine-benzocaine (CETACAINE) spray 1 spray  1 spray Topical Once Zi Pierre MD           PROBLEM LIST    Patient Active Problem List   Diagnosis    Calculus of ureter    Asthma    Obstructive sleep apnea    Breast mass, left    Rotator cuff arthropathy    Malignant neoplasm of left female breast ([address]    History of breast lump/mass excision    S/P lymph node biopsy    Cirrhosis of liver without ascites (HCC)    Pain of left breast    S/P radiation therapy 4-12 wks ago    Colon cancer screening    WD-Chronic ulcer of right thigh with fat layer exposed ([address]    Diabetes mellitus with skin ulcer ([address]    WD-Ulcer of abdomen wall with fat layer exposed ([address]    Mild persistent asthma without complication    WD-Unspecified open wound of abdominal wall, right lower quadrant without penetration into peritoneal cavity, initial encounter    Retroperitoneal lymphadenopathy    WD-Local infection of skin and subcutaneous tissue       REVIEW OF SYSTEMS    Pertinent items are noted in HPI. Otherwise, the rest of the 10 point ROS is negative. Objective:      BP (!) 142/97   Pulse 80   Temp 98 °F (36.7 °C)   Resp 16   Ht 5' 5\" (1.651 m)   Wt 300 lb (136.1 kg)   LMP 01/03/2012   BMI 49.92 kg/m²     PHYSICAL EXAM  GEN: Awake, oriented, looks a little older than stated age, morbidly obese, but not in any distress  HEENT: normocephalic head, symmetrical face, pupils are equal, round, reactive to light, EOM's intact, hearing normal to spoken word. NECK: obese neck, no adenopathy, carotids are equal, palpable  CV: S1, S2, RRR, no murmur, but distant tones  RESP: CTA bilaterally, but distant, she is not short of breath today. ABD: Obese, very tender abdominal skin over the folds, especially on the left. Her right abdominal pannus also has some redness, but no open areas.    EXT: edema of the lower extremities, but pulses are palpable  SKIN: excoriated and erythematous around her wounds on the left abdominal folds- appears to be infected. Neuro: no gross focal deficits, CN 2-12 grossly intact        Dermatologic exam: Visual inspection of the periwound reveals the skin to be moist and edematous  Wound exam: see wound description below in procedure note      Assessment:       Usman Lau  appears to have non-healing wounds of the left abdominal folds. The etiology of the wound is felt to be pressure from skin-on-skin contact and super-imposed infection. There are multiple complicating factors including diabetes, chronic pressure and obesity. A comprehensive wound management program would be helpful to heal this wound. Assessments completed include fall risk and nutritional, functional,and psychological status. At this time appropriate care would include: periodic debridement and wound care as below. The main goal of our care is to allow the abdominal folds to dry out and reduce the skin-on-skin pressure. We will attempt to get inter-dry for her again, but for now, will use betadine fluff 4x4 gauze. In addition- will need to f/u on culture- since the wound appears to be infected and she is so painful, I will treat with doxycycline. Will adjust antibiotics if needed. Problem List Items Addressed This Visit     WD-Ulcer of abdomen wall with fat layer exposed ([address]    WD-Unspecified open wound of abdominal wall, right lower quadrant without penetration into peritoneal cavity, initial encounter - Primary    Relevant Orders    Culture, Generic    WD-Local infection of skin and subcutaneous tissue    Relevant Orders    Culture, Generic      Other Visit Diagnoses    None. Plan:     Discharge instructions:  Discharge Instructions       PHYSICIAN ORDERS AND DISCHARGE INSTRUCTIONS    NOTE: Upon discharge from the [address], you will receive a patient experience survey.  We would be grateful if you would take the time to fill this survey out. Wound care order history:     Imaging:  Date      Cultures:  DONE 12/8/17   Labs/ HbA1c:   Date    Grafts:  Date       Antibiotics: RX DOXYCYCLINE BID X 10 DAYS              Earlier Wound care treatments: INTERDRY              Authorizations:      Consults:   Date     Primary care physician:     Continuing wound care orders and information:              Residence:                Continue home health care with:    Your wound-care supplies will be provided by: Margarita [facility]INA provider:   Compression with   Off loading:  Date    Wound Medications:    Wound cleansing:      Do not scrub or use excessive force. Wash hands with soap and water before and after dressing changes. Prior to applying a clean dressing, cleanse wound with normal saline,          wound cleanser, or mild soap and water. Ask the physician or nurse before getting the wound(s) wet in a shower   Daily Wound management:    Keep weight off wounds and reposition every 2 hours. Avoid standing for long periods of time. Apply wraps/stockings in AM and remove at bedtime. If swelling is present, elevate legs to the level of the heart or above for 30  minutes 4-5 times a day and/or when sitting. When taking antibiotics take entire prescription as ordered by physician do not stop taking until medicine is all gone. Orders for this week:12/8/17       LEFT ABDOMEN CLUSTER --  SOAK WITH VASHE FOR 5 MINUTES IN CLINIC. COVER WITH BETADINE DAMPENED FLUFFED 4X4,ABD. HOLD IN PLACE WITH CLOTHES. MAY USE XINC OXIDE TO REDDENED AREAS AROUND WOUNDS    Follow up with Dr Sis Umanzor  In 1 weeks in the wound care center  Call [phone] for any questions or concerns.   Date__________   Time____________                    Treatment Note Wound 12/08/17 WOUND #7 LEFT ABD(ONSET 1 WK)-Dressing/Treatment:  (5 min vashe soak, dampened fluffed 4x4, abd)    Written Patient Dismissal Instructions Patient unable to complete

## 2022-08-17 NOTE — PROGRESS NOTES
RN spoke with [facility] Solano and she said that they will resume services and the Pt has an appointment for 3/19/2020. PT updated. Interpolation Flap Text: A decision was made to reconstruct the defect utilizing an interpolation axial flap and a staged reconstruction.  A telfa template was made of the defect.  This telfa template was then used to outline the interpolation flap.  The donor area for the pedicle flap was then injected with anesthesia.  The flap was excised through the skin and subcutaneous tissue down to the layer of the underlying musculature.  The interpolation flap was carefully excised within this deep plane to maintain its blood supply.  The edges of the donor site were undermined.   The donor site was closed in a primary fashion.  The pedicle was then rotated into position and sutured.  Once the tube was sutured into place, adequate blood supply was confirmed with blanching and refill.  The pedicle was then wrapped with xeroform gauze and dressed appropriately with a telfa and gauze bandage to ensure continued blood supply and protect the attached pedicle.
